# Patient Record
Sex: FEMALE | Race: WHITE | NOT HISPANIC OR LATINO
[De-identification: names, ages, dates, MRNs, and addresses within clinical notes are randomized per-mention and may not be internally consistent; named-entity substitution may affect disease eponyms.]

---

## 2017-01-05 ENCOUNTER — MEDICATION RENEWAL (OUTPATIENT)
Age: 82
End: 2017-01-05

## 2017-01-12 ENCOUNTER — APPOINTMENT (OUTPATIENT)
Dept: CARDIOLOGY | Facility: CLINIC | Age: 82
End: 2017-01-12

## 2017-01-13 ENCOUNTER — MEDICATION RENEWAL (OUTPATIENT)
Age: 82
End: 2017-01-13

## 2017-01-15 ENCOUNTER — INPATIENT (INPATIENT)
Facility: HOSPITAL | Age: 82
LOS: 2 days | Discharge: NURSING, REHAB OR LONG-TERM CARE | End: 2017-01-18
Attending: FAMILY MEDICINE | Admitting: FAMILY MEDICINE
Payer: MEDICARE

## 2017-01-15 ENCOUNTER — RESULT REVIEW (OUTPATIENT)
Age: 82
End: 2017-01-15

## 2017-01-15 PROCEDURE — 73552 X-RAY EXAM OF FEMUR 2/>: CPT | Mod: 26

## 2017-01-15 PROCEDURE — 99285 EMERGENCY DEPT VISIT HI MDM: CPT | Mod: 25

## 2017-01-15 PROCEDURE — 73562 X-RAY EXAM OF KNEE 3: CPT | Mod: 26,RT

## 2017-01-15 PROCEDURE — 70450 CT HEAD/BRAIN W/O DYE: CPT | Mod: 26

## 2017-01-15 PROCEDURE — 72125 CT NECK SPINE W/O DYE: CPT | Mod: 26

## 2017-01-15 PROCEDURE — 71010: CPT | Mod: 26

## 2017-01-15 PROCEDURE — 73501 X-RAY EXAM HIP UNI 1 VIEW: CPT | Mod: 26

## 2017-01-15 PROCEDURE — 93010 ELECTROCARDIOGRAM REPORT: CPT

## 2017-01-16 ENCOUNTER — MEDICATION RENEWAL (OUTPATIENT)
Age: 82
End: 2017-01-16

## 2017-01-16 PROCEDURE — 73501 X-RAY EXAM HIP UNI 1 VIEW: CPT | Mod: 26

## 2017-01-16 PROCEDURE — 88311 DECALCIFY TISSUE: CPT | Mod: 26

## 2017-01-16 PROCEDURE — 88305 TISSUE EXAM BY PATHOLOGIST: CPT | Mod: 26

## 2017-01-17 ENCOUNTER — MEDICATION RENEWAL (OUTPATIENT)
Age: 82
End: 2017-01-17

## 2017-01-18 ENCOUNTER — RX RENEWAL (OUTPATIENT)
Age: 82
End: 2017-01-18

## 2017-01-23 DIAGNOSIS — Z88.8 ALLERGY STATUS TO OTHER DRUGS, MEDICAMENTS AND BIOLOGICAL SUBSTANCES STATUS: ICD-10-CM

## 2017-01-23 DIAGNOSIS — R09.02 HYPOXEMIA: ICD-10-CM

## 2017-01-23 DIAGNOSIS — S72.011A UNSPECIFIED INTRACAPSULAR FRACTURE OF RIGHT FEMUR, INITIAL ENCOUNTER FOR CLOSED FRACTURE: ICD-10-CM

## 2017-01-23 DIAGNOSIS — R82.90 UNSPECIFIED ABNORMAL FINDINGS IN URINE: ICD-10-CM
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
## 2017-01-23 DIAGNOSIS — J44.9 CHRONIC OBSTRUCTIVE PULMONARY DISEASE, UNSPECIFIED: ICD-10-CM

## 2017-01-23 DIAGNOSIS — F41.9 ANXIETY DISORDER, UNSPECIFIED: ICD-10-CM
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
## 2017-01-23 DIAGNOSIS — F32.9 MAJOR DEPRESSIVE DISORDER, SINGLE EPISODE, UNSPECIFIED: ICD-10-CM

## 2017-01-23 DIAGNOSIS — I44.7 LEFT BUNDLE-BRANCH BLOCK, UNSPECIFIED: ICD-10-CM

## 2017-01-23 DIAGNOSIS — I10 ESSENTIAL (PRIMARY) HYPERTENSION: ICD-10-CM

## 2017-01-26 ENCOUNTER — APPOINTMENT (OUTPATIENT)
Dept: CARDIOLOGY | Facility: CLINIC | Age: 82
End: 2017-01-26

## 2017-02-02 ENCOUNTER — APPOINTMENT (OUTPATIENT)
Dept: CARDIOLOGY | Facility: CLINIC | Age: 82
End: 2017-02-02

## 2017-02-13 ENCOUNTER — RX RENEWAL (OUTPATIENT)
Age: 82
End: 2017-02-13

## 2017-02-14 ENCOUNTER — APPOINTMENT (OUTPATIENT)
Dept: CARDIOLOGY | Facility: CLINIC | Age: 82
End: 2017-02-14

## 2017-02-22 ENCOUNTER — APPOINTMENT (OUTPATIENT)
Dept: CARDIOLOGY | Facility: CLINIC | Age: 82
End: 2017-02-22

## 2017-02-22 ENCOUNTER — MEDICATION RENEWAL (OUTPATIENT)
Age: 82
End: 2017-02-22

## 2017-04-24 ENCOUNTER — RX RENEWAL (OUTPATIENT)
Age: 82
End: 2017-04-24

## 2017-05-25 ENCOUNTER — MEDICATION RENEWAL (OUTPATIENT)
Age: 82
End: 2017-05-25

## 2017-05-31 ENCOUNTER — RX RENEWAL (OUTPATIENT)
Age: 82
End: 2017-05-31

## 2017-07-17 ENCOUNTER — EMERGENCY (EMERGENCY)
Facility: HOSPITAL | Age: 82
LOS: 0 days | Discharge: HOME | End: 2017-07-18
Attending: EMERGENCY MEDICINE | Admitting: EMERGENCY MEDICINE
Payer: MEDICARE

## 2017-07-17 VITALS
DIASTOLIC BLOOD PRESSURE: 64 MMHG | TEMPERATURE: 98 F | SYSTOLIC BLOOD PRESSURE: 139 MMHG | RESPIRATION RATE: 15 BRPM | OXYGEN SATURATION: 97 % | HEART RATE: 75 BPM

## 2017-07-17 VITALS — WEIGHT: 110.01 LBS | HEIGHT: 62 IN
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
## 2017-07-17 DIAGNOSIS — S40.022A CONTUSION OF LEFT UPPER ARM, INITIAL ENCOUNTER: ICD-10-CM

## 2017-07-17 DIAGNOSIS — Z88.5 ALLERGY STATUS TO NARCOTIC AGENT: ICD-10-CM
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
## 2017-07-17 DIAGNOSIS — Z79.82 LONG TERM (CURRENT) USE OF ASPIRIN: ICD-10-CM

## 2017-07-17 DIAGNOSIS — S49.92XA UNSPECIFIED INJURY OF LEFT SHOULDER AND UPPER ARM, INITIAL ENCOUNTER: ICD-10-CM

## 2017-07-17 PROCEDURE — 99284 EMERGENCY DEPT VISIT MOD MDM: CPT | Mod: 25

## 2017-07-17 NOTE — ED ADULT TRIAGE NOTE - CHIEF COMPLAINT QUOTE
"My left arm hurts."  Patient and daughter state that the patient was helping to walk with her granddaughter and tripped. Daughter states she caught the patient and that "she didn't hit the ground," but reports that the patient now has pain to her left arm where the daughter grabbed her to catch her.

## 2017-07-18 PROCEDURE — 73502 X-RAY EXAM HIP UNI 2-3 VIEWS: CPT | Mod: 26

## 2017-07-18 PROCEDURE — 73060 X-RAY EXAM OF HUMERUS: CPT | Mod: 26,LT

## 2017-07-18 NOTE — ED PROVIDER NOTE - OBJECTIVE STATEMENT
89 F presents with co pain to her Lt arm after a fall.  Pt did not hit ground but was held by her son.  He was holding her up by her arm which now hurts.  No distal numbness or tingling, no weakness.  No other trauma.  ? injury to Rt hip.  pt denies pain in that area but family request x-ray of hip as well as eval of Lt arm

## 2017-07-18 NOTE — ED PROVIDER NOTE - MUSCULOSKELETAL, MLM
(+) min area of ecchymosis to Lt humerus, FROM, no deformity.  Distal NVI.  cap refill < 2 sec.  FROM b/l le.

## 2017-07-18 NOTE — ED ADULT NURSE NOTE - OBJECTIVE STATEMENT
As per daughter, pt tripped while walking and was assisted to floor with son holding pt's left arm.  Pt sat on Right hip - denies any hip pain - pt c/o pain to left upper arm.  No LOC, did not hit head, was assisted to floor.  Pt is disoriented at baseline.

## 2017-07-21 ENCOUNTER — RX RENEWAL (OUTPATIENT)
Age: 82
End: 2017-07-21

## 2017-07-22 ENCOUNTER — RX RENEWAL (OUTPATIENT)
Age: 82
End: 2017-07-22

## 2017-10-23 ENCOUNTER — RX RENEWAL (OUTPATIENT)
Age: 82
End: 2017-10-23

## 2017-11-10 ENCOUNTER — CLINICAL ADVICE (OUTPATIENT)
Age: 82
End: 2017-11-10

## 2017-11-10 ENCOUNTER — RECORD ABSTRACTING (OUTPATIENT)
Age: 82
End: 2017-11-10

## 2017-11-10 DIAGNOSIS — Z83.49 FAMILY HISTORY OF OTHER ENDOCRINE, NUTRITIONAL AND METABOLIC DISEASES: ICD-10-CM

## 2017-11-10 DIAGNOSIS — Z82.49 FAMILY HISTORY OF ISCHEMIC HEART DISEASE AND OTHER DISEASES OF THE CIRCULATORY SYSTEM: ICD-10-CM

## 2017-11-10 DIAGNOSIS — Z83.3 FAMILY HISTORY OF DIABETES MELLITUS: ICD-10-CM

## 2017-11-10 DIAGNOSIS — Z81.1 FAMILY HISTORY OF ALCOHOL ABUSE AND DEPENDENCE: ICD-10-CM

## 2017-11-10 DIAGNOSIS — Z86.718 PERSONAL HISTORY OF OTHER VENOUS THROMBOSIS AND EMBOLISM: ICD-10-CM

## 2017-11-10 DIAGNOSIS — Z80.0 FAMILY HISTORY OF MALIGNANT NEOPLASM OF DIGESTIVE ORGANS: ICD-10-CM

## 2017-11-15 ENCOUNTER — APPOINTMENT (OUTPATIENT)
Dept: FAMILY MEDICINE | Facility: CLINIC | Age: 82
End: 2017-11-15
Payer: MEDICARE

## 2017-11-15 VITALS
SYSTOLIC BLOOD PRESSURE: 126 MMHG | BODY MASS INDEX: 22.63 KG/M2 | HEIGHT: 62 IN | WEIGHT: 123 LBS | DIASTOLIC BLOOD PRESSURE: 72 MMHG

## 2017-11-15 PROCEDURE — 99213 OFFICE O/P EST LOW 20 MIN: CPT

## 2017-12-01 ENCOUNTER — APPOINTMENT (OUTPATIENT)
Dept: FAMILY MEDICINE | Facility: CLINIC | Age: 82
End: 2017-12-01

## 2018-01-11 ENCOUNTER — APPOINTMENT (OUTPATIENT)
Dept: FAMILY MEDICINE | Facility: CLINIC | Age: 83
End: 2018-01-11
Payer: MEDICARE

## 2018-01-12 ENCOUNTER — APPOINTMENT (OUTPATIENT)
Dept: FAMILY MEDICINE | Facility: CLINIC | Age: 83
End: 2018-01-12
Payer: MEDICARE

## 2018-01-12 VITALS
DIASTOLIC BLOOD PRESSURE: 70 MMHG | BODY MASS INDEX: 22.63 KG/M2 | WEIGHT: 123 LBS | SYSTOLIC BLOOD PRESSURE: 120 MMHG | HEIGHT: 62 IN

## 2018-01-12 PROCEDURE — 36415 COLL VENOUS BLD VENIPUNCTURE: CPT

## 2018-01-12 PROCEDURE — 99214 OFFICE O/P EST MOD 30 MIN: CPT | Mod: 25

## 2018-01-12 RX ORDER — TRAZODONE HYDROCHLORIDE 50 MG/1
50 TABLET ORAL
Qty: 90 | Refills: 0 | Status: DISCONTINUED | COMMUNITY
Start: 2017-01-17 | End: 2018-01-12

## 2018-01-15 ENCOUNTER — FORM ENCOUNTER (OUTPATIENT)
Age: 83
End: 2018-01-15

## 2018-01-15 LAB
25(OH)D3 SERPL-MCNC: 47.8 NG/ML
ALBUMIN SERPL ELPH-MCNC: 3.9 G/DL
ALP BLD-CCNC: 118 U/L
ALT SERPL-CCNC: 13 U/L
ANION GAP SERPL CALC-SCNC: 24 MMOL/L
AST SERPL-CCNC: 14 U/L
BASOPHILS # BLD AUTO: 0.05 K/UL
BASOPHILS NFR BLD AUTO: 0.7 %
BILIRUB SERPL-MCNC: 0.3 MG/DL
BUN SERPL-MCNC: 29 MG/DL
CALCIUM SERPL-MCNC: 9.6 MG/DL
CHLORIDE SERPL-SCNC: 102 MMOL/L
CO2 SERPL-SCNC: 19 MMOL/L
CREAT SERPL-MCNC: 1.36 MG/DL
EOSINOPHIL # BLD AUTO: 0.15 K/UL
EOSINOPHIL NFR BLD AUTO: 2 %
ERYTHROCYTE [SEDIMENTATION RATE] IN BLOOD BY WESTERGREN METHOD: 24 MM/HR
FOLATE SERPL-MCNC: >20 NG/ML
GLUCOSE SERPL-MCNC: 72 MG/DL
HCT VFR BLD CALC: 36.7 %
HGB BLD-MCNC: 11.5 G/DL
IMM GRANULOCYTES NFR BLD AUTO: 0.3 %
LYMPHOCYTES # BLD AUTO: 1.15 K/UL
LYMPHOCYTES NFR BLD AUTO: 15 %
MAN DIFF?: NORMAL
MCHC RBC-ENTMCNC: 30 PG
MCHC RBC-ENTMCNC: 31.3 GM/DL
MCV RBC AUTO: 95.8 FL
MONOCYTES # BLD AUTO: 0.62 K/UL
MONOCYTES NFR BLD AUTO: 8.1 %
NEUTROPHILS # BLD AUTO: 5.67 K/UL
NEUTROPHILS NFR BLD AUTO: 73.9 %
PLATELET # BLD AUTO: 287 K/UL
POTASSIUM SERPL-SCNC: 5.4 MMOL/L
PROT SERPL-MCNC: 6.9 G/DL
RBC # BLD: 3.83 M/UL
RBC # FLD: 14 %
SODIUM SERPL-SCNC: 145 MMOL/L
T3FREE SERPL-MCNC: 2.67 PG/ML
T4 FREE SERPL-MCNC: 1.2 NG/DL
TSH SERPL-ACNC: 2.45 UIU/ML
VIT B12 SERPL-MCNC: 754 PG/ML
WBC # FLD AUTO: 7.66 K/UL

## 2018-01-16 ENCOUNTER — APPOINTMENT (OUTPATIENT)
Dept: CT IMAGING | Facility: CLINIC | Age: 83
End: 2018-01-16
Payer: MEDICARE

## 2018-01-16 ENCOUNTER — OUTPATIENT (OUTPATIENT)
Dept: OUTPATIENT SERVICES | Facility: HOSPITAL | Age: 83
LOS: 1 days | End: 2018-01-16
Payer: MEDICARE

## 2018-01-16 DIAGNOSIS — Z00.8 ENCOUNTER FOR OTHER GENERAL EXAMINATION: ICD-10-CM

## 2018-01-16 PROCEDURE — 70450 CT HEAD/BRAIN W/O DYE: CPT

## 2018-01-16 PROCEDURE — 70450 CT HEAD/BRAIN W/O DYE: CPT | Mod: 26

## 2018-01-22 ENCOUNTER — RX RENEWAL (OUTPATIENT)
Age: 83
End: 2018-01-22

## 2018-01-23 ENCOUNTER — APPOINTMENT (OUTPATIENT)
Dept: FAMILY MEDICINE | Facility: CLINIC | Age: 83
End: 2018-01-23
Payer: MEDICARE

## 2018-01-24 ENCOUNTER — APPOINTMENT (OUTPATIENT)
Dept: FAMILY MEDICINE | Facility: CLINIC | Age: 83
End: 2018-01-24
Payer: MEDICARE

## 2018-01-24 VITALS
HEIGHT: 62 IN | WEIGHT: 123 LBS | SYSTOLIC BLOOD PRESSURE: 118 MMHG | BODY MASS INDEX: 22.63 KG/M2 | DIASTOLIC BLOOD PRESSURE: 68 MMHG

## 2018-01-24 DIAGNOSIS — K92.1 MELENA: ICD-10-CM

## 2018-01-24 PROCEDURE — 99213 OFFICE O/P EST LOW 20 MIN: CPT

## 2018-01-25 ENCOUNTER — EMERGENCY (EMERGENCY)
Facility: HOSPITAL | Age: 83
LOS: 0 days | Discharge: HOME | End: 2018-01-25
Attending: EMERGENCY MEDICINE | Admitting: EMERGENCY MEDICINE
Payer: MEDICARE

## 2018-01-25 VITALS
RESPIRATION RATE: 17 BRPM | WEIGHT: 115.08 LBS | OXYGEN SATURATION: 98 % | DIASTOLIC BLOOD PRESSURE: 88 MMHG | HEIGHT: 58 IN | HEART RATE: 86 BPM | SYSTOLIC BLOOD PRESSURE: 176 MMHG

## 2018-01-25 VITALS
OXYGEN SATURATION: 98 % | DIASTOLIC BLOOD PRESSURE: 76 MMHG | TEMPERATURE: 98 F | SYSTOLIC BLOOD PRESSURE: 145 MMHG | HEART RATE: 89 BPM
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
## 2018-01-25 DIAGNOSIS — Z79.82 LONG TERM (CURRENT) USE OF ASPIRIN: ICD-10-CM
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
## 2018-01-25 DIAGNOSIS — M50.30 OTHER CERVICAL DISC DEGENERATION, UNSPECIFIED CERVICAL REGION: ICD-10-CM

## 2018-01-25 DIAGNOSIS — S62.102A FRACTURE OF UNSPECIFIED CARPAL BONE, LEFT WRIST, INITIAL ENCOUNTER FOR CLOSED FRACTURE: ICD-10-CM

## 2018-01-25 DIAGNOSIS — M25.532 PAIN IN LEFT WRIST: ICD-10-CM

## 2018-01-25 DIAGNOSIS — Z88.5 ALLERGY STATUS TO NARCOTIC AGENT: ICD-10-CM
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
## 2018-01-25 DIAGNOSIS — I67.89 OTHER CEREBROVASCULAR DISEASE: ICD-10-CM

## 2018-01-25 LAB
ALBUMIN SERPL ELPH-MCNC: 3.7 G/DL — SIGNIFICANT CHANGE UP (ref 3.3–5)
ALP SERPL-CCNC: 159 U/L — HIGH (ref 40–120)
ALT FLD-CCNC: 21 U/L — SIGNIFICANT CHANGE UP (ref 12–78)
ANION GAP SERPL CALC-SCNC: 10 MMOL/L — SIGNIFICANT CHANGE UP (ref 5–17)
AST SERPL-CCNC: 15 U/L — SIGNIFICANT CHANGE UP (ref 15–37)
BASOPHILS # BLD AUTO: 0.1 K/UL — SIGNIFICANT CHANGE UP (ref 0–0.2)
BASOPHILS NFR BLD AUTO: 1.7 % — SIGNIFICANT CHANGE UP (ref 0–2)
BILIRUB SERPL-MCNC: 0.3 MG/DL — SIGNIFICANT CHANGE UP (ref 0.2–1.2)
BUN SERPL-MCNC: 21 MG/DL — SIGNIFICANT CHANGE UP (ref 7–23)
CALCIUM SERPL-MCNC: 8.8 MG/DL — SIGNIFICANT CHANGE UP (ref 8.5–10.1)
CHLORIDE SERPL-SCNC: 106 MMOL/L — SIGNIFICANT CHANGE UP (ref 96–108)
CO2 SERPL-SCNC: 24 MMOL/L — SIGNIFICANT CHANGE UP (ref 22–31)
CREAT SERPL-MCNC: 1.18 MG/DL — SIGNIFICANT CHANGE UP (ref 0.5–1.3)
EOSINOPHIL # BLD AUTO: 0.2 K/UL — SIGNIFICANT CHANGE UP (ref 0–0.5)
EOSINOPHIL NFR BLD AUTO: 2.2 % — SIGNIFICANT CHANGE UP (ref 0–6)
GLUCOSE SERPL-MCNC: 85 MG/DL — SIGNIFICANT CHANGE UP (ref 70–99)
HCT VFR BLD CALC: 39.8 % — SIGNIFICANT CHANGE UP (ref 34.5–45)
HGB BLD-MCNC: 13 G/DL — SIGNIFICANT CHANGE UP (ref 11.5–15.5)
LYMPHOCYTES # BLD AUTO: 1.5 K/UL — SIGNIFICANT CHANGE UP (ref 1–3.3)
LYMPHOCYTES # BLD AUTO: 21.1 % — SIGNIFICANT CHANGE UP (ref 13–44)
MCHC RBC-ENTMCNC: 30.1 PG — SIGNIFICANT CHANGE UP (ref 27–34)
MCHC RBC-ENTMCNC: 32.8 GM/DL — SIGNIFICANT CHANGE UP (ref 32–36)
MCV RBC AUTO: 91.7 FL — SIGNIFICANT CHANGE UP (ref 80–100)
MONOCYTES # BLD AUTO: 0.3 K/UL — SIGNIFICANT CHANGE UP (ref 0–0.9)
MONOCYTES NFR BLD AUTO: 4.4 % — SIGNIFICANT CHANGE UP (ref 2–14)
NEUTROPHILS # BLD AUTO: 5.1 K/UL — SIGNIFICANT CHANGE UP (ref 1.8–7.4)
NEUTROPHILS NFR BLD AUTO: 70.6 % — SIGNIFICANT CHANGE UP (ref 43–77)
PLATELET # BLD AUTO: 241 K/UL — SIGNIFICANT CHANGE UP (ref 150–400)
POTASSIUM SERPL-MCNC: 4.2 MMOL/L — SIGNIFICANT CHANGE UP (ref 3.5–5.3)
POTASSIUM SERPL-SCNC: 4.2 MMOL/L — SIGNIFICANT CHANGE UP (ref 3.5–5.3)
PROT SERPL-MCNC: 7.8 GM/DL — SIGNIFICANT CHANGE UP (ref 6–8.3)
RBC # BLD: 4.34 M/UL — SIGNIFICANT CHANGE UP (ref 3.8–5.2)
RBC # FLD: 12.2 % — SIGNIFICANT CHANGE UP (ref 10.3–14.5)
SODIUM SERPL-SCNC: 140 MMOL/L — SIGNIFICANT CHANGE UP (ref 135–145)
WBC # BLD: 7.2 K/UL — SIGNIFICANT CHANGE UP (ref 3.8–10.5)
WBC # FLD AUTO: 7.2 K/UL — SIGNIFICANT CHANGE UP (ref 3.8–10.5)

## 2018-01-25 PROCEDURE — 73110 X-RAY EXAM OF WRIST: CPT | Mod: 26,LT

## 2018-01-25 PROCEDURE — 99285 EMERGENCY DEPT VISIT HI MDM: CPT | Mod: 25

## 2018-01-25 PROCEDURE — 72190 X-RAY EXAM OF PELVIS: CPT | Mod: 26

## 2018-01-25 PROCEDURE — 73110 X-RAY EXAM OF WRIST: CPT | Mod: 26,77,LT

## 2018-01-25 PROCEDURE — 72125 CT NECK SPINE W/O DYE: CPT | Mod: 26

## 2018-01-25 PROCEDURE — 70450 CT HEAD/BRAIN W/O DYE: CPT | Mod: 26

## 2018-01-25 PROCEDURE — 73090 X-RAY EXAM OF FOREARM: CPT | Mod: 26,LT

## 2018-01-25 PROCEDURE — 71045 X-RAY EXAM CHEST 1 VIEW: CPT | Mod: 26

## 2018-01-25 RX ORDER — SODIUM CHLORIDE 9 MG/ML
250 INJECTION INTRAMUSCULAR; INTRAVENOUS; SUBCUTANEOUS ONCE
Qty: 0 | Refills: 0 | Status: COMPLETED | OUTPATIENT
Start: 2018-01-25 | End: 2018-01-25

## 2018-01-25 RX ORDER — CEPHALEXIN 500 MG
500 CAPSULE ORAL
Qty: 0 | Refills: 0 | Status: DISCONTINUED | OUTPATIENT
Start: 2018-01-25 | End: 2018-01-25

## 2018-01-25 RX ORDER — CEFAZOLIN SODIUM 1 G
VIAL (EA) INJECTION
Qty: 0 | Refills: 0 | Status: DISCONTINUED | OUTPATIENT
Start: 2018-01-25 | End: 2018-01-25

## 2018-01-25 RX ORDER — CEFAZOLIN SODIUM 1 G
2000 VIAL (EA) INJECTION ONCE
Qty: 0 | Refills: 0 | Status: COMPLETED | OUTPATIENT
Start: 2018-01-25 | End: 2018-01-25

## 2018-01-25 RX ORDER — CEPHALEXIN 500 MG
1 CAPSULE ORAL
Qty: 15 | Refills: 0 | OUTPATIENT
Start: 2018-01-25 | End: 2018-01-29

## 2018-01-25 RX ORDER — CEFAZOLIN SODIUM 1 G
2000 VIAL (EA) INJECTION EVERY 8 HOURS
Qty: 0 | Refills: 0 | Status: DISCONTINUED | OUTPATIENT
Start: 2018-01-26 | End: 2018-01-25

## 2018-01-25 RX ADMIN — SODIUM CHLORIDE 250 MILLILITER(S): 9 INJECTION INTRAMUSCULAR; INTRAVENOUS; SUBCUTANEOUS at 20:16

## 2018-01-25 RX ADMIN — Medication 200 MILLIGRAM(S): at 22:37

## 2018-01-25 NOTE — ED ADULT TRIAGE NOTE - CHIEF COMPLAINT QUOTE
patient ambulating at home, trip and fall with pain to left wrist, patient did not hit her head or lose consciousness

## 2018-01-25 NOTE — CONSULT NOTE ADULT - ASSESSMENT
A/P: 89y Female with L distal both bone forearm fracture  Antibiotics - ancef while in house, keflex if discharged  Pain control  NWB L UE in splint, keep c/d/I, sling for comfort  FU postreduction xrays  Ice/elevation   Active movement of fingers encouraged  Ca/Vit D, outpt osteoporosis workup  Rcmnd 500 mg Vit C three times daily  Si/Sx compartment syndrome discussed with patient, told to return to ED if exhibit any  likely need for surgical intervention discussed with patient  All question answered  Can follow up with Dr. Jain as outpatient in office tomorrow, call office for appointment   Ortho stable

## 2018-01-25 NOTE — ED PROVIDER NOTE - PROGRESS NOTE DETAILS
ED attending, Dr. Espinosa: Orthopedic states that pt is okay to go home. Pt to be seen tomorrow by Dr. Jain. Does not recommend admission at this pint. Pt's forearm is reduced as best as ortho can. Spoke with pt's family, provided pt and family with outpatient f/u information and instruction to return if worsening of sx. ED attending, Dr. Espinosa: Orthopedic states that pt is okay to go home. Pt to be seen tomorrow by Dr. Jain. Does not recommend admission at this point. Pt's forearm is reduced as best as ortho can. Spoke with pt's family, provided pt and family with outpatient f/u information and instruction to return if worsening of sx.

## 2018-01-25 NOTE — ED PROVIDER NOTE - CARDIAC, MLM
Normal rate, regular rhythm.  Heart sounds S1, S2.  No murmurs, rubs or gallops. 2+ pulses b/l radial arteries. Normal rate, regular rhythm.  Heart sounds S1, S2.  No rubs or gallops. 2+ pulses b/l radial arteries.

## 2018-01-25 NOTE — ED PROVIDER NOTE - CONSTITUTIONAL, MLM
normal... Well appearing, well nourished, awake, alert, oriented to person, place, time/situation and in no apparent distress. Well appearing, well nourished, awake, alert and in no apparent distress. Nontoxic appearing.

## 2018-01-25 NOTE — ED PROVIDER NOTE - NS_ ATTENDINGSCRIBEDETAILS _ED_A_ED_FT
I Lupillo Espinosa MD saw and examined the patient. Scribe documented for me and under my supervision. I have modified the scribe's documentation where necessary to reflect my history, physical exam findings and other relevant documentations pertaining to the care of the patient.

## 2018-01-25 NOTE — ED PROVIDER NOTE - NEUROLOGICAL, MLM
Alert and oriented, no focal deficits, no motor or sensory deficits. Sensation and proprioception intact. Alert and awake, no motor or sensory deficits. Sensation and proprioception intact.

## 2018-01-25 NOTE — ED PROVIDER NOTE - OBJECTIVE STATEMENT
89 y/o female with no relevant PMHx presents to the ED s/p unwitnessed mechanical fall PTA. Pt tripped over a piece of wood between two rooms and landed on her left wrist. No head trauma or LOC. Pt was ambulatory after the fall. +left wrist pain. No lightheadedness, CP, SOB, abd pain, neck stiffness, leg pain before or after the fall. Pt lives with daughters. Allergic to Codeine, feels faint when she takes it. 89 y/o female with no relevant PMHx presents to the ED s/p unwitnessed mechanical fall PTA. Pt tripped over a piece of wood between two rooms and landed on her left wrist. No head trauma or LOC. Pt was ambulatory after the fall. +left wrist pain and deformity. No lightheadedness, CP, SOB, abd pain, neck stiffness, leg pain before or after the fall. Pt lives with daughters. Allergic to Codeine, feels faint when she takes it.

## 2018-01-25 NOTE — CONSULT NOTE ADULT - SUBJECTIVE AND OBJECTIVE BOX
89y Female RHD presents c/o L wrist pain sp mechanical fall. Denies HS/LOC. Denies numbness/tingling. Denies fever/chills. Denies pain/injury elsewhere. No other complaints.  Community ambulator with walker.      PAST MEDICAL & SURGICAL HISTORY:      MEDICATIONS  (STANDING):  ceFAZolin   IVPB        Allergies    codeine (Unknown)    Intolerances                                13.0   7.2   )-----------( 241      ( 25 Jan 2018 19:03 )             39.8     25 Jan 2018 19:03    140    |  106    |  21     ----------------------------<  85     4.2     |  24     |  1.18     Ca    8.8        25 Jan 2018 19:03    TPro  7.8    /  Alb  3.7    /  TBili  0.3    /  DBili  x      /  AST  15     /  ALT  21     /  AlkPhos  159    25 Jan 2018 19:03      Vital Signs Last 24 Hrs  T(C): 37.1 (01-25-18 @ 21:20), Max: 37.1 (01-25-18 @ 21:20)  T(F): 98.7 (01-25-18 @ 21:20), Max: 98.7 (01-25-18 @ 21:20)  HR: 84 (01-25-18 @ 21:20) (84 - 86)  BP: 141/73 (01-25-18 @ 21:20) (141/73 - 176/88)  BP(mean): --  RR: 16 (01-25-18 @ 21:20) (16 - 17)  SpO2: 98% (01-25-18 @ 21:20) (98% - 98%)    Imaging: XR demonstrates left distal both bone forearm fracture    Physical Exam  Gen: NAD  L UE: 0.5cm pokehole laceration over volar aspect of distal forearm, +deformity at wrist, +ttp wrist, +r/u/m/ain/pin function, SILT, radial pulse intact, compartments soft/compressible, warm/well perfused    Procedure:     Wound irrigated and gross debris removed.    10 cc 1% lidocaine injected under sterile procedure into L wrist fracture hematoma. Closed reduction performed. Placed in well padded sugartong splint. Post procedure xrays obtained. Post procedure exam unchanged, NV intact. Patient tolerated well.  2nd reduction attempt performed.  Post procedure exam unchanged, NV intact. Patient tolerated well.

## 2018-01-25 NOTE — ED PROVIDER NOTE - MUSCULOSKELETAL, MLM
Spine appears normal. 5/5 strength. nontender to palpitation of hip. +bruising on fifth metacarpal pharyngeal joint. Visible deformity of left forearm. Spine appears normal. 5/5 strength on flexion and extension. nontender to palpitation of hip. +bruising on fifth metacarpal phalangeal joint. Visible deformity of left distal forearm.

## 2018-01-26 ENCOUNTER — APPOINTMENT (OUTPATIENT)
Dept: ORTHOPEDIC SURGERY | Facility: CLINIC | Age: 83
End: 2018-01-26
Payer: MEDICARE

## 2018-01-26 VITALS
WEIGHT: 123 LBS | SYSTOLIC BLOOD PRESSURE: 133 MMHG | HEART RATE: 74 BPM | TEMPERATURE: 98.1 F | DIASTOLIC BLOOD PRESSURE: 70 MMHG | BODY MASS INDEX: 22.63 KG/M2 | HEIGHT: 62 IN

## 2018-01-26 PROCEDURE — 99203 OFFICE O/P NEW LOW 30 MIN: CPT

## 2018-01-29 ENCOUNTER — APPOINTMENT (OUTPATIENT)
Dept: FAMILY MEDICINE | Facility: CLINIC | Age: 83
End: 2018-01-29
Payer: MEDICARE

## 2018-01-29 ENCOUNTER — NON-APPOINTMENT (OUTPATIENT)
Age: 83
End: 2018-01-29

## 2018-01-29 VITALS
BODY MASS INDEX: 22.63 KG/M2 | SYSTOLIC BLOOD PRESSURE: 110 MMHG | HEIGHT: 62 IN | DIASTOLIC BLOOD PRESSURE: 70 MMHG | WEIGHT: 123 LBS

## 2018-01-29 PROCEDURE — 99215 OFFICE O/P EST HI 40 MIN: CPT | Mod: 25

## 2018-01-29 PROCEDURE — 93000 ELECTROCARDIOGRAM COMPLETE: CPT

## 2018-01-30 RX ORDER — FAMOTIDINE 10 MG/ML
20 INJECTION INTRAVENOUS ONCE
Qty: 0 | Refills: 0 | Status: COMPLETED | OUTPATIENT
Start: 2018-01-31 | End: 2018-01-31

## 2018-01-31 ENCOUNTER — APPOINTMENT (OUTPATIENT)
Dept: ORTHOPEDIC SURGERY | Facility: HOSPITAL | Age: 83
End: 2018-01-31

## 2018-01-31 ENCOUNTER — OUTPATIENT (OUTPATIENT)
Dept: OUTPATIENT SERVICES | Facility: HOSPITAL | Age: 83
LOS: 1 days | Discharge: HOME | End: 2018-01-31
Payer: MEDICARE

## 2018-01-31 VITALS
RESPIRATION RATE: 16 BRPM | SYSTOLIC BLOOD PRESSURE: 147 MMHG | WEIGHT: 115.08 LBS | HEART RATE: 67 BPM | DIASTOLIC BLOOD PRESSURE: 75 MMHG | HEIGHT: 60 IN | OXYGEN SATURATION: 100 % | TEMPERATURE: 97 F

## 2018-01-31 LAB
APTT BLD: 28 SEC — SIGNIFICANT CHANGE UP (ref 27.5–37.4)
INR BLD: 1.09 RATIO — SIGNIFICANT CHANGE UP (ref 0.88–1.16)
PROTHROM AB SERPL-ACNC: 11.8 SEC — SIGNIFICANT CHANGE UP (ref 9.8–12.7)

## 2018-01-31 PROCEDURE — 25575 OPTX RDL&ULN SHFT FX RDS&ULN: CPT | Mod: LT

## 2018-01-31 RX ORDER — ONDANSETRON 8 MG/1
4 TABLET, FILM COATED ORAL EVERY 6 HOURS
Qty: 0 | Refills: 0 | Status: DISCONTINUED | OUTPATIENT
Start: 2018-01-31 | End: 2018-02-01

## 2018-01-31 RX ORDER — MUPIROCIN 20 MG/G
1 OINTMENT TOPICAL
Qty: 0 | Refills: 0 | COMMUNITY

## 2018-01-31 RX ORDER — CEFAZOLIN SODIUM 1 G
2000 VIAL (EA) INJECTION EVERY 8 HOURS
Qty: 0 | Refills: 0 | Status: COMPLETED | OUTPATIENT
Start: 2018-01-31 | End: 2018-02-01

## 2018-01-31 RX ORDER — TRAMADOL HYDROCHLORIDE 50 MG/1
1 TABLET ORAL
Qty: 28 | Refills: 0 | OUTPATIENT
Start: 2018-01-31 | End: 2018-02-06

## 2018-01-31 RX ORDER — FENTANYL CITRATE 50 UG/ML
25 INJECTION INTRAVENOUS
Qty: 0 | Refills: 0 | Status: DISCONTINUED | OUTPATIENT
Start: 2018-01-31 | End: 2018-01-31

## 2018-01-31 RX ORDER — FOLIC ACID 0.8 MG
1 TABLET ORAL DAILY
Qty: 0 | Refills: 0 | Status: DISCONTINUED | OUTPATIENT
Start: 2018-01-31 | End: 2018-02-01

## 2018-01-31 RX ORDER — ACETAMINOPHEN 500 MG
650 TABLET ORAL EVERY 6 HOURS
Qty: 0 | Refills: 0 | Status: DISCONTINUED | OUTPATIENT
Start: 2018-01-31 | End: 2018-02-01

## 2018-01-31 RX ORDER — ASCORBIC ACID 60 MG
500 TABLET,CHEWABLE ORAL
Qty: 0 | Refills: 0 | Status: DISCONTINUED | OUTPATIENT
Start: 2018-01-31 | End: 2018-02-01

## 2018-01-31 RX ORDER — ASPIRIN/CALCIUM CARB/MAGNESIUM 324 MG
0 TABLET ORAL
Qty: 0 | Refills: 0 | COMMUNITY

## 2018-01-31 RX ORDER — SODIUM CHLORIDE 9 MG/ML
3 INJECTION INTRAMUSCULAR; INTRAVENOUS; SUBCUTANEOUS EVERY 8 HOURS
Qty: 0 | Refills: 0 | Status: DISCONTINUED | OUTPATIENT
Start: 2018-01-31 | End: 2018-01-31

## 2018-01-31 RX ORDER — DOCUSATE SODIUM 100 MG
100 CAPSULE ORAL THREE TIMES A DAY
Qty: 0 | Refills: 0 | Status: DISCONTINUED | OUTPATIENT
Start: 2018-01-31 | End: 2018-02-01

## 2018-01-31 RX ORDER — SODIUM CHLORIDE 9 MG/ML
1000 INJECTION, SOLUTION INTRAVENOUS
Qty: 0 | Refills: 0 | Status: DISCONTINUED | OUTPATIENT
Start: 2018-01-31 | End: 2018-02-01

## 2018-01-31 RX ORDER — DIPHENHYDRAMINE HCL 50 MG
25 CAPSULE ORAL AT BEDTIME
Qty: 0 | Refills: 0 | Status: DISCONTINUED | OUTPATIENT
Start: 2018-01-31 | End: 2018-02-01

## 2018-01-31 RX ORDER — SODIUM CHLORIDE 9 MG/ML
1000 INJECTION INTRAMUSCULAR; INTRAVENOUS; SUBCUTANEOUS
Qty: 0 | Refills: 0 | Status: DISCONTINUED | OUTPATIENT
Start: 2018-01-31 | End: 2018-01-31

## 2018-01-31 RX ORDER — ONDANSETRON 8 MG/1
4 TABLET, FILM COATED ORAL ONCE
Qty: 0 | Refills: 0 | Status: DISCONTINUED | OUTPATIENT
Start: 2018-01-31 | End: 2018-01-31

## 2018-01-31 RX ORDER — ACETAMINOPHEN 500 MG
1000 TABLET ORAL ONCE
Qty: 0 | Refills: 0 | Status: COMPLETED | OUTPATIENT
Start: 2018-01-31 | End: 2018-01-31

## 2018-01-31 RX ORDER — TRAMADOL HYDROCHLORIDE 50 MG/1
50 TABLET ORAL EVERY 6 HOURS
Qty: 0 | Refills: 0 | Status: DISCONTINUED | OUTPATIENT
Start: 2018-01-31 | End: 2018-02-01

## 2018-01-31 RX ADMIN — Medication 400 MILLIGRAM(S): at 15:41

## 2018-01-31 RX ADMIN — Medication 200 MILLIGRAM(S): at 21:52

## 2018-01-31 RX ADMIN — TRAMADOL HYDROCHLORIDE 50 MILLIGRAM(S): 50 TABLET ORAL at 18:31

## 2018-01-31 RX ADMIN — FAMOTIDINE 20 MILLIGRAM(S): 10 INJECTION INTRAVENOUS at 11:27

## 2018-01-31 RX ADMIN — SODIUM CHLORIDE 75 MILLILITER(S): 9 INJECTION INTRAMUSCULAR; INTRAVENOUS; SUBCUTANEOUS at 15:41

## 2018-01-31 RX ADMIN — TRAMADOL HYDROCHLORIDE 50 MILLIGRAM(S): 50 TABLET ORAL at 18:01

## 2018-01-31 NOTE — ASU DISCHARGE PLAN (ADULT/PEDIATRIC). - SPECIAL INSTRUCTIONS
1.	Pain Control  2.	Non-Weight Bearing  Left Upper Extremity  3.	DVT Prophylaxis - encourage ambulation  4.	PT as needed  5.	Follow up with Dr. Jain as outpatient in 10-14 days after discharge from the hospital or rehab. Call office for appointment.   6.	Suture removal and repeat x-rays on  Post-Op Day 14  7.	Ice/Elevate affected area as needed  8.	Keep Splint Clean and dry.

## 2018-01-31 NOTE — ASU DISCHARGE PLAN (ADULT/PEDIATRIC). - MEDICATION SUMMARY - MEDICATIONS TO STOP TAKING
I will STOP taking the medications listed below when I get home from the hospital:    mupirocin 2% nasal ointment  -- 1 application into nose 2 times a day

## 2018-01-31 NOTE — BRIEF OPERATIVE NOTE - PROCEDURE
<<-----Click on this checkbox to enter Procedure Open reduction and internal fixation (ORIF) of forearm  01/31/2018    Active  [id number]

## 2018-01-31 NOTE — ASU DISCHARGE PLAN (ADULT/PEDIATRIC). - MEDICATION SUMMARY - MEDICATIONS TO TAKE
I will START or STAY ON the medications listed below when I get home from the hospital:    aspirin  -- 81  orally  -- Indication: For home med    Pristiq  --  by mouth   -- Indication: For home med    mirtazapine  --  by mouth   -- Indication: For home med    nortriptyline  --  by mouth   -- Indication: For home med    atorvastatin  --  by mouth   -- Indication: For home med    ARIPiprazole  --  by mouth   -- Indication: For home med    busPIRone  --  by mouth   -- Indication: For home med    metoprolol  --  by mouth   -- Indication: For home med    amLODIPine  --  by mouth   -- Indication: For home med    Vitamin D3  --  by mouth   -- Indication: For home med I will START or STAY ON the medications listed below when I get home from the hospital:    aspirin  -- 81  orally  -- Indication: For home med    Ultram 50 mg oral tablet  -- 1 tab(s) by mouth every 6 hours MDD:4   -- Caution federal law prohibits the transfer of this drug to any person other  than the person for whom it was prescribed.  May cause drowsiness.  Alcohol may intensify this effect.  Use care when operating dangerous machinery.  Obtain medical advice before taking any non-prescription drugs as some may affect the action of this medication.    -- Indication: For Pain    Pristiq  --  by mouth   -- Indication: For home med    mirtazapine  --  by mouth   -- Indication: For home med    nortriptyline  --  by mouth   -- Indication: For home med    atorvastatin  --  by mouth   -- Indication: For home med    ARIPiprazole  --  by mouth   -- Indication: For home med    busPIRone  --  by mouth   -- Indication: For home med    metoprolol  --  by mouth   -- Indication: For home med    amLODIPine  --  by mouth   -- Indication: For home med    Vitamin D3  --  by mouth   -- Indication: For home med

## 2018-02-01 VITALS
OXYGEN SATURATION: 95 % | HEART RATE: 87 BPM | SYSTOLIC BLOOD PRESSURE: 149 MMHG | DIASTOLIC BLOOD PRESSURE: 66 MMHG | RESPIRATION RATE: 16 BRPM | TEMPERATURE: 98 F

## 2018-02-01 LAB
ANION GAP SERPL CALC-SCNC: 9 MMOL/L — SIGNIFICANT CHANGE UP (ref 5–17)
BUN SERPL-MCNC: 12 MG/DL — SIGNIFICANT CHANGE UP (ref 7–23)
CALCIUM SERPL-MCNC: 8.4 MG/DL — LOW (ref 8.5–10.1)
CHLORIDE SERPL-SCNC: 104 MMOL/L — SIGNIFICANT CHANGE UP (ref 96–108)
CO2 SERPL-SCNC: 24 MMOL/L — SIGNIFICANT CHANGE UP (ref 22–31)
CREAT SERPL-MCNC: 1.14 MG/DL — SIGNIFICANT CHANGE UP (ref 0.5–1.3)
GLUCOSE SERPL-MCNC: 118 MG/DL — HIGH (ref 70–99)
HCT VFR BLD CALC: 31.2 % — LOW (ref 34.5–45)
HGB BLD-MCNC: 10.4 G/DL — LOW (ref 11.5–15.5)
MCHC RBC-ENTMCNC: 30.7 PG — SIGNIFICANT CHANGE UP (ref 27–34)
MCHC RBC-ENTMCNC: 33.5 GM/DL — SIGNIFICANT CHANGE UP (ref 32–36)
MCV RBC AUTO: 91.6 FL — SIGNIFICANT CHANGE UP (ref 80–100)
PLATELET # BLD AUTO: 210 K/UL — SIGNIFICANT CHANGE UP (ref 150–400)
POTASSIUM SERPL-MCNC: 3.6 MMOL/L — SIGNIFICANT CHANGE UP (ref 3.5–5.3)
POTASSIUM SERPL-SCNC: 3.6 MMOL/L — SIGNIFICANT CHANGE UP (ref 3.5–5.3)
RBC # BLD: 3.4 M/UL — LOW (ref 3.8–5.2)
RBC # FLD: 12.4 % — SIGNIFICANT CHANGE UP (ref 10.3–14.5)
SODIUM SERPL-SCNC: 137 MMOL/L — SIGNIFICANT CHANGE UP (ref 135–145)
WBC # BLD: 9.1 K/UL — SIGNIFICANT CHANGE UP (ref 3.8–10.5)
WBC # FLD AUTO: 9.1 K/UL — SIGNIFICANT CHANGE UP (ref 3.8–10.5)

## 2018-02-01 RX ADMIN — TRAMADOL HYDROCHLORIDE 50 MILLIGRAM(S): 50 TABLET ORAL at 07:39

## 2018-02-01 RX ADMIN — TRAMADOL HYDROCHLORIDE 50 MILLIGRAM(S): 50 TABLET ORAL at 03:00

## 2018-02-01 RX ADMIN — Medication 650 MILLIGRAM(S): at 06:31

## 2018-02-01 RX ADMIN — Medication 1 MILLIGRAM(S): at 09:34

## 2018-02-01 RX ADMIN — Medication 1 TABLET(S): at 09:34

## 2018-02-01 RX ADMIN — Medication 200 MILLIGRAM(S): at 05:50

## 2018-02-01 RX ADMIN — Medication 650 MILLIGRAM(S): at 05:53

## 2018-02-01 RX ADMIN — TRAMADOL HYDROCHLORIDE 50 MILLIGRAM(S): 50 TABLET ORAL at 02:20

## 2018-02-01 NOTE — PROGRESS NOTE ADULT - SUBJECTIVE AND OBJECTIVE BOX
Patient seen and examined. Pain controlled. No acute events overnight    HEALTH ISSUES - PROBLEM Dx:  s/p L BBFA ORIF      MEDICATIONS  (STANDING):  ascorbic acid 500 milliGRAM(s) Oral two times a day  docusate sodium 100 milliGRAM(s) Oral three times a day  folic acid 1 milliGRAM(s) Oral daily  lactated ringers. 1000 milliLiter(s) IV Continuous <Continuous>  multivitamin 1 Tablet(s) Oral daily    Allergies    codeine (Unknown)    Intolerances              PT/INR - ( 31 Jan 2018 12:26 )   PT: 11.8 sec;   INR: 1.09 ratio         PTT - ( 31 Jan 2018 12:26 )  PTT:28.0 sec  Vital Signs Last 24 Hrs  T(C): 37.2 (02-01-18 @ 05:31), Max: 37.2 (02-01-18 @ 05:31)  T(F): 98.9 (02-01-18 @ 05:31), Max: 98.9 (02-01-18 @ 05:31)  HR: 100 (02-01-18 @ 05:31) (63 - 100)  BP: 143/65 (02-01-18 @ 05:31) (133/59 - 166/52)  BP(mean): --  RR: 16 (02-01-18 @ 05:31) (14 - 17)  SpO2: 94% (02-01-18 @ 05:31) (93% - 100%)    Physical Exam  Gen: NAD  LUE:  Dressing c/d/i  +ain/pin/rad/uln  fingers silt  brisk cap refill, fingers warm  Compartments soft    A/P: 89y Female sp L BBFA Fx ORIF  Pain control  SCD  NWB  FU labs  Ice/elevate  Incentive spirometry  Dispo planning

## 2018-02-04 ENCOUNTER — INPATIENT (INPATIENT)
Facility: HOSPITAL | Age: 83
LOS: 3 days | Discharge: NURSING, REHAB OR LONG-TERM CARE | End: 2018-02-08
Attending: FAMILY MEDICINE | Admitting: FAMILY MEDICINE
Payer: MEDICARE

## 2018-02-04 VITALS
TEMPERATURE: 98 F | OXYGEN SATURATION: 94 % | HEART RATE: 96 BPM | RESPIRATION RATE: 17 BRPM | WEIGHT: 115.08 LBS | DIASTOLIC BLOOD PRESSURE: 76 MMHG | SYSTOLIC BLOOD PRESSURE: 158 MMHG | HEIGHT: 62 IN

## 2018-02-04 PROCEDURE — 73590 X-RAY EXAM OF LOWER LEG: CPT | Mod: 26,LT

## 2018-02-04 PROCEDURE — 73552 X-RAY EXAM OF FEMUR 2/>: CPT | Mod: 26

## 2018-02-04 PROCEDURE — 73502 X-RAY EXAM HIP UNI 2-3 VIEWS: CPT | Mod: 26

## 2018-02-04 PROCEDURE — 73562 X-RAY EXAM OF KNEE 3: CPT | Mod: 26,LT

## 2018-02-04 RX ORDER — ACETAMINOPHEN 500 MG
650 TABLET ORAL ONCE
Qty: 0 | Refills: 0 | Status: COMPLETED | OUTPATIENT
Start: 2018-02-04 | End: 2018-02-04

## 2018-02-04 RX ADMIN — Medication 650 MILLIGRAM(S): at 23:14

## 2018-02-04 NOTE — ED PROVIDER NOTE - PROGRESS NOTE DETAILS
Pts knee pain improved with lidoderm patch.  If CT negative, pts family will pick her up later today. Ortho resident paged for consult and coming to see patient. Pt. having difficulty with left arm and left leg fracture and will not be able to get around at home.  Pt. will be admitted for physical therapy and rehab placement.

## 2018-02-04 NOTE — ED ADULT NURSE REASSESSMENT NOTE - NS ED NURSE REASSESS COMMENT FT1
pt BIBA s/p tripped over rug and now left knee pain, pt states she hit her left side against sink and wall, but did not hit head or ground. _ +pp, toes active with +sensation, has difficulty raising leg at this time. Dr. Keller at bedside, tylenol ordered. Awaiting xrays. Fell last wk and injured left arm (ace wrap at this time)

## 2018-02-04 NOTE — ED ADULT TRIAGE NOTE - CHIEF COMPLAINT QUOTE
c/o left knee pain after tripping on rug at home, denies head injury or LOC., pt ambulatory with assistance at baseline and was able to assist in getting up on scene. Left arm splinted from injury 4 days ago.

## 2018-02-04 NOTE — ED PROVIDER NOTE - MUSCULOSKELETAL, MLM
Spine appears normal, Splint on left forearm; left fingers with healing ecchymosis and mild swelling but full range of motion.  +tenderness to left anterior knee.  Decreased ROM of left lower extremity without deformity or left ankle, tib/fib, or hip tenderness.

## 2018-02-04 NOTE — ED PROVIDER NOTE - MEDICAL DECISION MAKING DETAILS
Mechanical fall with left leg pain.  Xrays to r/o fracture.  Tylenol for pain and ambulation trial if xrays are normal.

## 2018-02-04 NOTE — ED PROVIDER NOTE - OBJECTIVE STATEMENT
Pt. is a 89 yo F with a hx of depression and anxiety, left hip surgery, HTN, hyperlipidemia, recent left forearm fracture BIB ambulance after mechanical fall tripping on a bunched up rug on the floor.  Pt. was walking into the bathroom and has a splint on her left arm and tripped on the rug and fell into the wall and then to the floor on her left leg.  Pt. denies head hitting floor.  Denies LOC.  Complains of pain in left knee.  Had trouble walking due to the pain.  Takes aspirin 81 mg daily.  No other injury.

## 2018-02-04 NOTE — ED PROVIDER NOTE - CARE PLAN
Principal Discharge DX:	Sprain of left knee, initial encounter Principal Discharge DX:	Tibial plateau fracture, left, closed, initial encounter

## 2018-02-05 LAB
24R-OH-CALCIDIOL SERPL-MCNC: 49.3 NG/ML — SIGNIFICANT CHANGE UP (ref 30–80)
ALBUMIN SERPL ELPH-MCNC: 3.3 G/DL — SIGNIFICANT CHANGE UP (ref 3.3–5)
ALP SERPL-CCNC: 106 U/L — SIGNIFICANT CHANGE UP (ref 40–120)
ALT FLD-CCNC: 15 U/L — SIGNIFICANT CHANGE UP (ref 12–78)
ANION GAP SERPL CALC-SCNC: 8 MMOL/L — SIGNIFICANT CHANGE UP (ref 5–17)
AST SERPL-CCNC: 19 U/L — SIGNIFICANT CHANGE UP (ref 15–37)
BASOPHILS # BLD AUTO: 0.1 K/UL — SIGNIFICANT CHANGE UP (ref 0–0.2)
BASOPHILS NFR BLD AUTO: 1.1 % — SIGNIFICANT CHANGE UP (ref 0–2)
BILIRUB SERPL-MCNC: 0.5 MG/DL — SIGNIFICANT CHANGE UP (ref 0.2–1.2)
BUN SERPL-MCNC: 15 MG/DL — SIGNIFICANT CHANGE UP (ref 7–23)
CALCIUM SERPL-MCNC: 8.9 MG/DL — SIGNIFICANT CHANGE UP (ref 8.5–10.1)
CHLORIDE SERPL-SCNC: 107 MMOL/L — SIGNIFICANT CHANGE UP (ref 96–108)
CO2 SERPL-SCNC: 26 MMOL/L — SIGNIFICANT CHANGE UP (ref 22–31)
CREAT SERPL-MCNC: 1.08 MG/DL — SIGNIFICANT CHANGE UP (ref 0.5–1.3)
EOSINOPHIL # BLD AUTO: 0.1 K/UL — SIGNIFICANT CHANGE UP (ref 0–0.5)
EOSINOPHIL NFR BLD AUTO: 1.4 % — SIGNIFICANT CHANGE UP (ref 0–6)
GLUCOSE SERPL-MCNC: 111 MG/DL — HIGH (ref 70–99)
HCT VFR BLD CALC: 30.4 % — LOW (ref 34.5–45)
HGB BLD-MCNC: 10.2 G/DL — LOW (ref 11.5–15.5)
LYMPHOCYTES # BLD AUTO: 0.6 K/UL — LOW (ref 1–3.3)
LYMPHOCYTES # BLD AUTO: 7.6 % — LOW (ref 13–44)
MCHC RBC-ENTMCNC: 30.3 PG — SIGNIFICANT CHANGE UP (ref 27–34)
MCHC RBC-ENTMCNC: 33.4 GM/DL — SIGNIFICANT CHANGE UP (ref 32–36)
MCV RBC AUTO: 90.7 FL — SIGNIFICANT CHANGE UP (ref 80–100)
MONOCYTES # BLD AUTO: 0.6 K/UL — SIGNIFICANT CHANGE UP (ref 0–0.9)
MONOCYTES NFR BLD AUTO: 7 % — SIGNIFICANT CHANGE UP (ref 2–14)
NEUTROPHILS # BLD AUTO: 6.9 K/UL — SIGNIFICANT CHANGE UP (ref 1.8–7.4)
NEUTROPHILS NFR BLD AUTO: 82.9 % — HIGH (ref 43–77)
PLATELET # BLD AUTO: 252 K/UL — SIGNIFICANT CHANGE UP (ref 150–400)
POTASSIUM SERPL-MCNC: 4.1 MMOL/L — SIGNIFICANT CHANGE UP (ref 3.5–5.3)
POTASSIUM SERPL-SCNC: 4.1 MMOL/L — SIGNIFICANT CHANGE UP (ref 3.5–5.3)
PROT SERPL-MCNC: 7 GM/DL — SIGNIFICANT CHANGE UP (ref 6–8.3)
RBC # BLD: 3.36 M/UL — LOW (ref 3.8–5.2)
RBC # FLD: 12.1 % — SIGNIFICANT CHANGE UP (ref 10.3–14.5)
SODIUM SERPL-SCNC: 141 MMOL/L — SIGNIFICANT CHANGE UP (ref 135–145)
WBC # BLD: 8.4 K/UL — SIGNIFICANT CHANGE UP (ref 3.8–10.5)
WBC # FLD AUTO: 8.4 K/UL — SIGNIFICANT CHANGE UP (ref 3.8–10.5)

## 2018-02-05 PROCEDURE — 93010 ELECTROCARDIOGRAM REPORT: CPT

## 2018-02-05 PROCEDURE — 93010 ELECTROCARDIOGRAM REPORT: CPT | Mod: 77

## 2018-02-05 PROCEDURE — 76377 3D RENDER W/INTRP POSTPROCES: CPT | Mod: 26

## 2018-02-05 PROCEDURE — 99223 1ST HOSP IP/OBS HIGH 75: CPT

## 2018-02-05 PROCEDURE — 73700 CT LOWER EXTREMITY W/O DYE: CPT | Mod: 26,LT

## 2018-02-05 PROCEDURE — 99285 EMERGENCY DEPT VISIT HI MDM: CPT

## 2018-02-05 RX ORDER — MIRTAZAPINE 45 MG/1
45 TABLET, ORALLY DISINTEGRATING ORAL AT BEDTIME
Qty: 0 | Refills: 0 | Status: DISCONTINUED | OUTPATIENT
Start: 2018-02-05 | End: 2018-02-08

## 2018-02-05 RX ORDER — AMLODIPINE BESYLATE 2.5 MG/1
5 TABLET ORAL DAILY
Qty: 0 | Refills: 0 | Status: DISCONTINUED | OUTPATIENT
Start: 2018-02-05 | End: 2018-02-08

## 2018-02-05 RX ORDER — ATORVASTATIN CALCIUM 80 MG/1
10 TABLET, FILM COATED ORAL AT BEDTIME
Qty: 0 | Refills: 0 | Status: DISCONTINUED | OUTPATIENT
Start: 2018-02-05 | End: 2018-02-08

## 2018-02-05 RX ORDER — DOCUSATE SODIUM 100 MG
100 CAPSULE ORAL
Qty: 0 | Refills: 0 | Status: DISCONTINUED | OUTPATIENT
Start: 2018-02-05 | End: 2018-02-08

## 2018-02-05 RX ORDER — HEPARIN SODIUM 5000 [USP'U]/ML
5000 INJECTION INTRAVENOUS; SUBCUTANEOUS EVERY 8 HOURS
Qty: 0 | Refills: 0 | Status: DISCONTINUED | OUTPATIENT
Start: 2018-02-05 | End: 2018-02-08

## 2018-02-05 RX ORDER — DESVENLAFAXINE 50 MG/1
100 TABLET, EXTENDED RELEASE ORAL DAILY
Qty: 0 | Refills: 0 | Status: DISCONTINUED | OUTPATIENT
Start: 2018-02-05 | End: 2018-02-08

## 2018-02-05 RX ORDER — CHOLECALCIFEROL (VITAMIN D3) 125 MCG
2000 CAPSULE ORAL DAILY
Qty: 0 | Refills: 0 | Status: DISCONTINUED | OUTPATIENT
Start: 2018-02-05 | End: 2018-02-08

## 2018-02-05 RX ORDER — ACETAMINOPHEN 500 MG
650 TABLET ORAL EVERY 6 HOURS
Qty: 0 | Refills: 0 | Status: DISCONTINUED | OUTPATIENT
Start: 2018-02-05 | End: 2018-02-08

## 2018-02-05 RX ORDER — METOPROLOL TARTRATE 50 MG
12.5 TABLET ORAL
Qty: 0 | Refills: 0 | Status: DISCONTINUED | OUTPATIENT
Start: 2018-02-05 | End: 2018-02-08

## 2018-02-05 RX ORDER — ASPIRIN/CALCIUM CARB/MAGNESIUM 324 MG
81 TABLET ORAL DAILY
Qty: 0 | Refills: 0 | Status: DISCONTINUED | OUTPATIENT
Start: 2018-02-05 | End: 2018-02-08

## 2018-02-05 RX ORDER — TRAMADOL HYDROCHLORIDE 50 MG/1
50 TABLET ORAL
Qty: 0 | Refills: 0 | Status: DISCONTINUED | OUTPATIENT
Start: 2018-02-05 | End: 2018-02-08

## 2018-02-05 RX ORDER — NORTRIPTYLINE HYDROCHLORIDE 10 MG/1
50 CAPSULE ORAL AT BEDTIME
Qty: 0 | Refills: 0 | Status: DISCONTINUED | OUTPATIENT
Start: 2018-02-05 | End: 2018-02-08

## 2018-02-05 RX ORDER — FERROUS SULFATE 325(65) MG
325 TABLET ORAL
Qty: 0 | Refills: 0 | Status: DISCONTINUED | OUTPATIENT
Start: 2018-02-05 | End: 2018-02-08

## 2018-02-05 RX ORDER — ASPIRIN/CALCIUM CARB/MAGNESIUM 324 MG
81 TABLET ORAL DAILY
Qty: 0 | Refills: 0 | Status: DISCONTINUED | OUTPATIENT
Start: 2018-02-05 | End: 2018-02-05

## 2018-02-05 RX ORDER — LIDOCAINE 4 G/100G
1 CREAM TOPICAL ONCE
Qty: 0 | Refills: 0 | Status: COMPLETED | OUTPATIENT
Start: 2018-02-05 | End: 2018-02-05

## 2018-02-05 RX ORDER — ARIPIPRAZOLE 15 MG/1
5 TABLET ORAL DAILY
Qty: 0 | Refills: 0 | Status: DISCONTINUED | OUTPATIENT
Start: 2018-02-05 | End: 2018-02-08

## 2018-02-05 RX ADMIN — Medication 100 MILLIGRAM(S): at 17:11

## 2018-02-05 RX ADMIN — Medication 12.5 MILLIGRAM(S): at 17:10

## 2018-02-05 RX ADMIN — Medication 81 MILLIGRAM(S): at 11:22

## 2018-02-05 RX ADMIN — LIDOCAINE 1 PATCH: 4 CREAM TOPICAL at 01:04

## 2018-02-05 RX ADMIN — MIRTAZAPINE 45 MILLIGRAM(S): 45 TABLET, ORALLY DISINTEGRATING ORAL at 22:23

## 2018-02-05 RX ADMIN — ARIPIPRAZOLE 5 MILLIGRAM(S): 15 TABLET ORAL at 14:17

## 2018-02-05 RX ADMIN — AMLODIPINE BESYLATE 5 MILLIGRAM(S): 2.5 TABLET ORAL at 17:11

## 2018-02-05 RX ADMIN — Medication 325 MILLIGRAM(S): at 11:22

## 2018-02-05 RX ADMIN — Medication 30 MILLIGRAM(S): at 14:16

## 2018-02-05 RX ADMIN — Medication 2000 UNIT(S): at 11:22

## 2018-02-05 RX ADMIN — HEPARIN SODIUM 5000 UNIT(S): 5000 INJECTION INTRAVENOUS; SUBCUTANEOUS at 22:21

## 2018-02-05 RX ADMIN — HEPARIN SODIUM 5000 UNIT(S): 5000 INJECTION INTRAVENOUS; SUBCUTANEOUS at 14:23

## 2018-02-05 RX ADMIN — Medication 325 MILLIGRAM(S): at 17:12

## 2018-02-05 RX ADMIN — ATORVASTATIN CALCIUM 10 MILLIGRAM(S): 80 TABLET, FILM COATED ORAL at 22:21

## 2018-02-05 NOTE — H&P ADULT - HISTORY OF PRESENT ILLNESS
HPI : Pt. is a 89 yo F with a hx of depression and anxiety, left hip surgery, HTN, hyperlipidemia, recent left forearm fracture and ORIF repair BIB ambulance after mechanical fall tripping on a bunched up rug on the floor.  Pt. was walking into the bathroom with a splint on her left arm, tripped on the rug and fell into the wall, then to the floor on her left leg.  Pt. denies head hitting floor.  Denies LOC.  Complains of pain in left knee.  Had trouble walking due to the pain.  Takes aspirin 81 mg daily.  No other injury. Found to have tibial plateau fracture and advised non-weight bearing.	    Active Problems  Aortic valve disorder (424.1) (I35.9)  Bilateral edema of lower extremity (782.3) (R60.0)  CAD (coronary artery disease), native coronary artery (414.01) (I25.10)  Closed fracture of shaft of left radius and ulna, initial encounter (813.23)  (S52.202A,S52.302A)  Essential hypertension (401.9) (I10)  Hematochezia not due to hemorrhage from anus (578.1) (K92.1)  History of depression (V11.8) (Z86.59)  Hyperlipidemia (272.4) (E78.5)  Left bundle branch block (LBBB) (426.3) (I44.7)  Visual hallucinations (368.16) (R44.1)    Past Medical History  CAD (coronary artery disease), native coronary artery (414.01) (I25.10)  Essential hypertension (401.9) (I10)  History of deep venous thrombosis (V12.51) (Z86.718)  History of depression (V11.8) (Z86.59)  History of non-ST elevation myocardial infarction (NSTEMI) (412) (I25.2)  Hyperlipidemia (272.4) (E78.5)    Surgical History  History of Cardiac Cath Procedure Summary  History of Total Hip Replacement    Family History  Family history of CVA (cerebrovascular accident due to intracerebral hemorrhage) :  Brother  Family history of  : Mother, Father  Family history of alcoholism (V17.0) (Z81.1) : Brother  Family history of cardiac disorder (V17.49) (Z82.49) : Uncle  Family history of colon cancer (V16.0) (Z80.0) : Uncle  Family history of diabetes mellitus (V18.0) (Z83.3) : Mother  Family history of hyperlipidemia (V18.19) (Z83.49) : Son    Social History    Never a smoker  No alcohol use  No drug use  Occupation

## 2018-02-05 NOTE — ED ADULT NURSE REASSESSMENT NOTE - NS ED NURSE REASSESS COMMENT FT1
Patient received in bed 16. Alert and oriented. Requesting water to drink. Patient cleared by ortho with no intervention warranted. Awaiting admission orders.

## 2018-02-05 NOTE — H&P ADULT - ASSESSMENT
89 yo F with a hx of depression and anxiety, left hip surgery, HTN, hyperlipidemia, recent left forearm fracture and ORIF repair BIB ambulance after mechanical fall     Assessment:  Tibial Plateau Fx left leg  Recent Wrist Fx s/p ORIF  Multiple Falls Recently  HTN  Severe Depression controlled with Medication  Mild anemia secondary to recent surgery  H/o ASHD and LBBB  Hyperlipidemia    Plan:  Admit to orthopedic unit bed on medicine service  Low sodium low chol diet  PT  Non weight bearing left leg  left knee immobilizer  Splint to left wrist  Social Service Consult  Will require rehab transfer  Continue outpatient medications  Increase vit D dose  Vitamin D level  Ortho following  Further Tx as her condition warrants    DVT Prophylaxis:  Heparin SQ    Advanced Directives:  Full Code

## 2018-02-05 NOTE — ED ADULT NURSE REASSESSMENT NOTE - NS ED NURSE REASSESS COMMENT FT1
pt evaluated by ortho- lidocaine patch found at bedside bedside, immobilizer in place, IVL palced to RT FA#[id number],labs drawn. Pt admitted, social work consult to be placed.

## 2018-02-05 NOTE — PHYSICAL THERAPY INITIAL EVALUATION ADULT - DIAGNOSIS, PT EVAL
89 y.o. female s/p fall resulting in left tibial plateau fx - NWB in a Knee immobilizer, + subacute left distal radius/ulnar fracture - splinted NWB

## 2018-02-05 NOTE — CONSULT NOTE ADULT - SUBJECTIVE AND OBJECTIVE BOX
89y Female community ambulatory presents c/o L knee pain and inability to ambulate sp mechanical fall at home. She states she tripped on a rug, and fell into the wall, twisted her knee and then fell to the floor. Denies HS/LOC. Denies numbness/tingling. Denies fever/chills. Denies pain/injury elsewhere.     HEALTH ISSUES - PROBLEM Dx:  HTN, HLD, Depression, S/p L BBFA Fx ORIF      MEDICATIONS  (STANDING):    Allergies    codeine (Unknown)      Vital Signs Last 24 Hrs  T(C): 36.7 (02-04-18 @ 22:40), Max: 36.7 (02-04-18 @ 22:40)  T(F): 98.1 (02-04-18 @ 22:40), Max: 98.1 (02-04-18 @ 22:40)  HR: 96 (02-04-18 @ 22:40) (96 - 96)  BP: 158/76 (02-04-18 @ 22:40) (158/76 - 158/76)  BP(mean): --  RR: 17 (02-04-18 @ 22:40) (17 - 17)  SpO2: 94% (02-04-18 @ 22:40) (94% - 94%)    Imaging: XR neg for fx  CT LUE prelim read: questionable non-displaced lateral tib plateau fracture    Physical Exam  Gen: NAD  LLE: skin intact no erythema/ecchymosis, no ttp along joint line, able to SLR, able to flex knee to 80 degrees with some discomfort, neg log roll, +ehl/fhl/ta/gs function, SILT L3-S1, no calf ttp, dp/pt pulse intact, compartments soft  Secondary survey: benign, nv intact, able to SLR contralateral leg, negative log roll contralateral leg, no bony ttp elsewhere    A/P: 89y Female with L knee injury poss non-displaced tibial plateau fracture  Pain control  NWB in knee immobilizer  FU labs/imaging  Check vitamin D levels  Ca/Vit D supplementation  Outpt osteoporosis workup  Admit to medical team  No further acute ortho intervention indicated, may f/u as outpt  Will discuss with attending

## 2018-02-05 NOTE — PHYSICAL THERAPY INITIAL EVALUATION ADULT - GENERAL OBSERVATIONS, REHAB EVAL
The pt was pleasant and cooperative, received on 2N, supine and eager to participate with PT. The pt had 1 distal left UE wrist splint in place and 1 left knee immobilizer in place over ace bandaging.

## 2018-02-05 NOTE — H&P ADULT - NSHPREVIEWOFSYSTEMS_GEN_ALL_CORE
CONSTITUTIONAL: No weakness, fevers or chills  EYES/ENT: No visual changes;  No vertigo or throat pain   NECK: No pain or stiffness  RESPIRATORY: No cough, wheezing, hemoptysis; No shortness of breath  CARDIOVASCULAR: No chest pain or palpitations  GASTROINTESTINAL: No abdominal or epigastric pain. No nausea, vomiting, or hematemesis; No diarrhea or constipation. No melena or hematochezia.  GENITOURINARY: No dysuria, frequency or hematuria  NEUROLOGICAL: No numbness or weakness  SKIN: No itching, burning, rashes, or lesions   All other review of systems is negative unless indicated above

## 2018-02-05 NOTE — PHYSICAL THERAPY INITIAL EVALUATION ADULT - PERTINENT HX OF CURRENT PROBLEM, REHAB EVAL
Pt. is a 89 yo F with a hx of depression and anxiety, left hip surgery, HTN, hyperlipidemia, recent left forearm fracture and ORIF repair BIB ambulance after mechanical fall tripping on a bunched up rug on the floor.  Pt. was walking into the bathroom with a splint on her left arm, tripped on the rug and fell into the wall, then to the floor on her left leg. Found to have left tibial plateau fracture and advised non-weight bearing.

## 2018-02-05 NOTE — ED ADULT NURSE REASSESSMENT NOTE - NS ED NURSE REASSESS COMMENT FT1
xrays negative for fx- pt difficulty ambulating with assist due to knee pain,  aware with orders for lidocaine patch and further imaging. Pt remains in no acute distress.

## 2018-02-05 NOTE — H&P ADULT - NSHPPHYSICALEXAM_GEN_ALL_CORE
Vital Signs Last 24 Hrs  T(C): 36.9 (05 Feb 2018 06:42), Max: 36.9 (05 Feb 2018 06:42)  T(F): 98.4 (05 Feb 2018 06:42), Max: 98.4 (05 Feb 2018 06:42)  HR: 91 (05 Feb 2018 06:42) (90 - 96)  BP: 141/72 (05 Feb 2018 06:42) (141/72 - 158/76)  BP(mean): --  RR: 18 (05 Feb 2018 06:42) (17 - 18)  SpO2: 94% (05 Feb 2018 05:32) (94% - 94%)  Constitutional: NAD, awake and alert, well-developed  HEENT: PERRLA, EOMI, Pharynx Clear  Neck: Supple, No JVD, No Lymphadenopathy  Respiratory: Breath sounds are clear bilaterally, No wheezing, rales or rhonchi  Cardiovascular: S1 and S2, regular rate and rhythm, no Murmurs, rubs or gallops  Gastrointestinal: Bowel Sounds present, soft, nontender. No Hepatosplenomegaly. No masses  Extremities: Splint to left forearm and wrist non tender, knee immobilizer to left leg, no C/C/E. FROM of other extremities  Vascular: 2+ peripheral pulses  Neurological: A/O x 3, no focal deficits  Skin: No rashes

## 2018-02-05 NOTE — H&P ADULT - NSHPLABSRESULTS_GEN_ALL_CORE
10.2   8.4   )-----------( 252      ( 05 Feb 2018 05:27 )             30.4     141  |  107  |  15  ----------------------------<  111<H>  4.1   |  26  |  1.08    Ca    8.9      05 Feb 2018 05:27  TPro  7.0  /  Alb  3.3  /  TBili  0.5  /  DBili  x   /  AST  19  /  ALT  15  /  AlkPhos  106  02-05    CT of lower Extremity  Tibial Plateau Fx

## 2018-02-05 NOTE — PHYSICAL THERAPY INITIAL EVALUATION ADULT - MODALITIES TREATMENT COMMENTS
The pt was left sitting OOB and in a chair, adjusted for comfort with pillows and a stool. Chair alarm secured, cb, tray and phone in place at end of tx. The pt was in NAD at end of tx.

## 2018-02-05 NOTE — PHYSICAL THERAPY INITIAL EVALUATION ADULT - ASR EQUIP NEEDS DISCH PT EVAL
gait belt/Left sided platform walker,- the platform itself will have to be short due to the pt's height./3:1 commode

## 2018-02-06 LAB
ANION GAP SERPL CALC-SCNC: 7 MMOL/L — SIGNIFICANT CHANGE UP (ref 5–17)
BASOPHILS # BLD AUTO: 0.1 K/UL — SIGNIFICANT CHANGE UP (ref 0–0.2)
BASOPHILS NFR BLD AUTO: 1.3 % — SIGNIFICANT CHANGE UP (ref 0–2)
BUN SERPL-MCNC: 10 MG/DL — SIGNIFICANT CHANGE UP (ref 7–23)
CALCIUM SERPL-MCNC: 8.6 MG/DL — SIGNIFICANT CHANGE UP (ref 8.5–10.1)
CHLORIDE SERPL-SCNC: 107 MMOL/L — SIGNIFICANT CHANGE UP (ref 96–108)
CO2 SERPL-SCNC: 25 MMOL/L — SIGNIFICANT CHANGE UP (ref 22–31)
CREAT SERPL-MCNC: 1.05 MG/DL — SIGNIFICANT CHANGE UP (ref 0.5–1.3)
EOSINOPHIL # BLD AUTO: 0.3 K/UL — SIGNIFICANT CHANGE UP (ref 0–0.5)
EOSINOPHIL NFR BLD AUTO: 4.3 % — SIGNIFICANT CHANGE UP (ref 0–6)
GLUCOSE SERPL-MCNC: 91 MG/DL — SIGNIFICANT CHANGE UP (ref 70–99)
HCT VFR BLD CALC: 31.3 % — LOW (ref 34.5–45)
HGB BLD-MCNC: 10.4 G/DL — LOW (ref 11.5–15.5)
LYMPHOCYTES # BLD AUTO: 1 K/UL — SIGNIFICANT CHANGE UP (ref 1–3.3)
LYMPHOCYTES # BLD AUTO: 15.5 % — SIGNIFICANT CHANGE UP (ref 13–44)
MAGNESIUM SERPL-MCNC: 2.1 MG/DL — SIGNIFICANT CHANGE UP (ref 1.6–2.6)
MCHC RBC-ENTMCNC: 30.2 PG — SIGNIFICANT CHANGE UP (ref 27–34)
MCHC RBC-ENTMCNC: 33.1 GM/DL — SIGNIFICANT CHANGE UP (ref 32–36)
MCV RBC AUTO: 91.3 FL — SIGNIFICANT CHANGE UP (ref 80–100)
MONOCYTES # BLD AUTO: 0.5 K/UL — SIGNIFICANT CHANGE UP (ref 0–0.9)
MONOCYTES NFR BLD AUTO: 7.1 % — SIGNIFICANT CHANGE UP (ref 2–14)
NEUTROPHILS # BLD AUTO: 4.9 K/UL — SIGNIFICANT CHANGE UP (ref 1.8–7.4)
NEUTROPHILS NFR BLD AUTO: 71.9 % — SIGNIFICANT CHANGE UP (ref 43–77)
PHOSPHATE SERPL-MCNC: 3.3 MG/DL — SIGNIFICANT CHANGE UP (ref 2.5–4.5)
PLATELET # BLD AUTO: 242 K/UL — SIGNIFICANT CHANGE UP (ref 150–400)
POTASSIUM SERPL-MCNC: 3.6 MMOL/L — SIGNIFICANT CHANGE UP (ref 3.5–5.3)
POTASSIUM SERPL-SCNC: 3.6 MMOL/L — SIGNIFICANT CHANGE UP (ref 3.5–5.3)
RBC # BLD: 3.43 M/UL — LOW (ref 3.8–5.2)
RBC # FLD: 11.7 % — SIGNIFICANT CHANGE UP (ref 10.3–14.5)
SODIUM SERPL-SCNC: 139 MMOL/L — SIGNIFICANT CHANGE UP (ref 135–145)
WBC # BLD: 6.8 K/UL — SIGNIFICANT CHANGE UP (ref 3.8–10.5)
WBC # FLD AUTO: 6.8 K/UL — SIGNIFICANT CHANGE UP (ref 3.8–10.5)

## 2018-02-06 PROCEDURE — 99232 SBSQ HOSP IP/OBS MODERATE 35: CPT

## 2018-02-06 RX ADMIN — Medication 30 MILLIGRAM(S): at 11:29

## 2018-02-06 RX ADMIN — Medication 12.5 MILLIGRAM(S): at 07:01

## 2018-02-06 RX ADMIN — HEPARIN SODIUM 5000 UNIT(S): 5000 INJECTION INTRAVENOUS; SUBCUTANEOUS at 07:01

## 2018-02-06 RX ADMIN — HEPARIN SODIUM 5000 UNIT(S): 5000 INJECTION INTRAVENOUS; SUBCUTANEOUS at 13:19

## 2018-02-06 RX ADMIN — Medication 12.5 MILLIGRAM(S): at 17:08

## 2018-02-06 RX ADMIN — Medication 2000 UNIT(S): at 11:30

## 2018-02-06 RX ADMIN — AMLODIPINE BESYLATE 5 MILLIGRAM(S): 2.5 TABLET ORAL at 07:01

## 2018-02-06 RX ADMIN — Medication 81 MILLIGRAM(S): at 11:30

## 2018-02-06 RX ADMIN — Medication 100 MILLIGRAM(S): at 07:01

## 2018-02-06 RX ADMIN — DESVENLAFAXINE 100 MILLIGRAM(S): 50 TABLET, EXTENDED RELEASE ORAL at 19:31

## 2018-02-06 RX ADMIN — Medication 325 MILLIGRAM(S): at 07:41

## 2018-02-06 RX ADMIN — NORTRIPTYLINE HYDROCHLORIDE 50 MILLIGRAM(S): 10 CAPSULE ORAL at 22:14

## 2018-02-06 RX ADMIN — NORTRIPTYLINE HYDROCHLORIDE 50 MILLIGRAM(S): 10 CAPSULE ORAL at 00:03

## 2018-02-06 RX ADMIN — Medication 325 MILLIGRAM(S): at 17:08

## 2018-02-06 RX ADMIN — Medication 325 MILLIGRAM(S): at 13:22

## 2018-02-06 RX ADMIN — HEPARIN SODIUM 5000 UNIT(S): 5000 INJECTION INTRAVENOUS; SUBCUTANEOUS at 22:07

## 2018-02-06 RX ADMIN — ATORVASTATIN CALCIUM 10 MILLIGRAM(S): 80 TABLET, FILM COATED ORAL at 22:07

## 2018-02-06 RX ADMIN — MIRTAZAPINE 45 MILLIGRAM(S): 45 TABLET, ORALLY DISINTEGRATING ORAL at 22:07

## 2018-02-06 RX ADMIN — ARIPIPRAZOLE 5 MILLIGRAM(S): 15 TABLET ORAL at 11:30

## 2018-02-06 NOTE — PROGRESS NOTE ADULT - SUBJECTIVE AND OBJECTIVE BOX
Pt S/E at bedside, no acute events overnight, pain controlled    AVSS  Gen: NAD, AAOx3    Left Lower Extremity:  In BJKI  +EHL/FHL/TA/GS  SILT L3-S1  +DP/PT Pulses  Compartments soft  No calf TTP B/L    Left Upper Extremity:  Splint intact  +AIN/PIN/M/R/U/Msc/Ax  SILT C5-T1  +Radial Pulse  Compartments soft  No calf TTP B/L

## 2018-02-06 NOTE — PROGRESS NOTE ADULT - SUBJECTIVE AND OBJECTIVE BOX
SUBJECTIVE:    CHIEF COMPLAINT:  Patient is a 89y old  Female who presents with a chief complaint of I fell at home- I tripped over a rug and twisted my knee (2018 11:06)    HPI:  HPI : Pt. is a 89 yo F with a hx of depression and anxiety, left hip surgery, HTN, hyperlipidemia, recent left forearm fracture and ORIF repair BIB ambulance after mechanical fall tripping on a bunched up rug on the floor.  Pt. was walking into the bathroom with a splint on her left arm, tripped on the rug and fell into the wall, then to the floor on her left leg.  Pt. denies head hitting floor.  Denies LOC.  Complains of pain in left knee.  Had trouble walking due to the pain.  Takes aspirin 81 mg daily.  No other injury. Found to have tibial plateau fracture and advised non-weight bearing.	    Active Problems  Aortic valve disorder (424.1) (I35.9)  Bilateral edema of lower extremity (782.3) (R60.0)  CAD (coronary artery disease), native coronary artery (414.01) (I25.10)  Closed fracture of shaft of left radius and ulna, initial encounter (813.23)  (S52.202A,S52.302A)  Essential hypertension (401.9) (I10)  Hematochezia not due to hemorrhage from anus (578.1) (K92.1)  History of depression (V11.8) (Z86.59)  Hyperlipidemia (272.4) (E78.5)  Left bundle branch block (LBBB) (426.3) (I44.7)  Visual hallucinations (368.16) (R44.1)    Past Medical History  CAD (coronary artery disease), native coronary artery (414.01) (I25.10)  Essential hypertension (401.9) (I10)  History of deep venous thrombosis (V12.51) (Z86.718)  History of depression (V11.8) (Z86.59)  History of non-ST elevation myocardial infarction (NSTEMI) (412) (I25.2)  Hyperlipidemia (272.4) (E78.5)    Surgical History  History of Cardiac Cath Procedure Summary  History of Total Hip Replacement    Family History  Family history of CVA (cerebrovascular accident due to intracerebral hemorrhage) :  Brother  Family history of  : Mother, Father  Family history of alcoholism (V17.0) (Z81.1) : Brother  Family history of cardiac disorder (V17.49) (Z82.49) : Uncle  Family history of colon cancer (V16.0) (Z80.0) : Uncle  Family history of diabetes mellitus (V18.0) (Z83.3) : Mother  Family history of hyperlipidemia (V18.19) (Z83.49) : Son    Social History    Never a smoker  No alcohol use  No drug use  Occupation (2018 08:16)      Interval HPI and Overnight Events: Tolerating weight bearing with walker and knee immobilizer.    REVIEW OF SYSTEMS:  CONSTITUTIONAL: No weakness, fevers or chills  EYES/ENT: No visual changes;  No vertigo or throat pain   NECK: No pain or stiffness  RESPIRATORY: No cough, wheezing, hemoptysis; No shortness of breath  CARDIOVASCULAR: No chest pain or palpitations  GASTROINTESTINAL: No abdominal or epigastric pain. No nausea, vomiting, or hematemesis; No diarrhea or constipation. No melena or hematochezia.  GENITOURINARY: No dysuria, frequency or hematuria  NEUROLOGICAL: No numbness or weakness  SKIN: No itching, burning, rashes, or lesions   All other review of systems is negative unless indicated above    OBJECTIVE    Vital Signs Last 24 Hrs  T(C): 36.9 (2018 17:25), Max: 36.9 (2018 17:43)  T(F): 98.5 (2018 17:25), Max: 98.5 (2018 17:25)  HR: 86 (2018 17:25) (83 - 92)  BP: 129/63 (2018 17:25) (95/55 - 146/86)  BP(mean): 64 (2018 11:21) (64 - 64)  RR: 16 (2018 17:25) (16 - 18)  SpO2: 95% (2018 17:25) (90% - 100%)    MEDICATIONS  (STANDING):  amLODIPine   Tablet 5 milliGRAM(s) Oral daily  ARIPiprazole 5 milliGRAM(s) Oral daily  aspirin  chewable 81 milliGRAM(s) Oral daily  atorvastatin 10 milliGRAM(s) Oral at bedtime  busPIRone 30 milliGRAM(s) Oral daily  cholecalciferol 2000 Unit(s) Oral daily  desvenlafaxine  milliGRAM(s) Oral daily  docusate sodium 100 milliGRAM(s) Oral two times a day  ferrous    sulfate 325 milliGRAM(s) Oral three times a day with meals  heparin  Injectable 5000 Unit(s) SubCutaneous every 8 hours  metoprolol     tartrate 12.5 milliGRAM(s) Oral two times a day  mirtazapine 45 milliGRAM(s) Oral at bedtime  nortriptyline 50 milliGRAM(s) Oral at bedtime      LABS:                         10.4   6.8   )-----------( 242      ( 2018 06:13 )             31.3     02-06    139  |  107  |  10  ----------------------------<  91  3.6   |  25  |  1.05    Ca    8.6      2018 06:13  Phos  3.3     02-06  Mg     2.1     02-06    TPro  7.0  /  Alb  3.3  /  TBili  0.5  /  DBili  x   /  AST  19  /  ALT  15  /  AlkPhos  106  02-05
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
## 2018-02-07 DIAGNOSIS — S52.202A UNSPECIFIED FRACTURE OF SHAFT OF LEFT ULNA, INITIAL ENCOUNTER FOR CLOSED FRACTURE: ICD-10-CM

## 2018-02-07 DIAGNOSIS — E78.5 HYPERLIPIDEMIA, UNSPECIFIED: ICD-10-CM

## 2018-02-07 DIAGNOSIS — I44.7 LEFT BUNDLE-BRANCH BLOCK, UNSPECIFIED: ICD-10-CM
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
## 2018-02-07 DIAGNOSIS — I10 ESSENTIAL (PRIMARY) HYPERTENSION: ICD-10-CM

## 2018-02-07 DIAGNOSIS — Z88.5 ALLERGY STATUS TO NARCOTIC AGENT: ICD-10-CM

## 2018-02-07 DIAGNOSIS — I25.10 ATHEROSCLEROTIC HEART DISEASE OF NATIVE CORONARY ARTERY WITHOUT ANGINA PECTORIS: ICD-10-CM
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
## 2018-02-07 DIAGNOSIS — I35.9 NONRHEUMATIC AORTIC VALVE DISORDER, UNSPECIFIED: ICD-10-CM

## 2018-02-07 DIAGNOSIS — S52.302A UNSPECIFIED FRACTURE OF SHAFT OF LEFT RADIUS, INITIAL ENCOUNTER FOR CLOSED FRACTURE: ICD-10-CM

## 2018-02-07 DIAGNOSIS — F32.9 MAJOR DEPRESSIVE DISORDER, SINGLE EPISODE, UNSPECIFIED: ICD-10-CM

## 2018-02-07 PROCEDURE — 99232 SBSQ HOSP IP/OBS MODERATE 35: CPT

## 2018-02-07 RX ADMIN — DESVENLAFAXINE 100 MILLIGRAM(S): 50 TABLET, EXTENDED RELEASE ORAL at 12:01

## 2018-02-07 RX ADMIN — Medication 2000 UNIT(S): at 12:00

## 2018-02-07 RX ADMIN — Medication 30 MILLIGRAM(S): at 12:00

## 2018-02-07 RX ADMIN — HEPARIN SODIUM 5000 UNIT(S): 5000 INJECTION INTRAVENOUS; SUBCUTANEOUS at 13:45

## 2018-02-07 RX ADMIN — Medication 325 MILLIGRAM(S): at 12:00

## 2018-02-07 RX ADMIN — ATORVASTATIN CALCIUM 10 MILLIGRAM(S): 80 TABLET, FILM COATED ORAL at 22:00

## 2018-02-07 RX ADMIN — HEPARIN SODIUM 5000 UNIT(S): 5000 INJECTION INTRAVENOUS; SUBCUTANEOUS at 05:52

## 2018-02-07 RX ADMIN — MIRTAZAPINE 45 MILLIGRAM(S): 45 TABLET, ORALLY DISINTEGRATING ORAL at 22:00

## 2018-02-07 RX ADMIN — ARIPIPRAZOLE 5 MILLIGRAM(S): 15 TABLET ORAL at 11:59

## 2018-02-07 RX ADMIN — HEPARIN SODIUM 5000 UNIT(S): 5000 INJECTION INTRAVENOUS; SUBCUTANEOUS at 22:01

## 2018-02-07 RX ADMIN — AMLODIPINE BESYLATE 5 MILLIGRAM(S): 2.5 TABLET ORAL at 05:52

## 2018-02-07 RX ADMIN — NORTRIPTYLINE HYDROCHLORIDE 50 MILLIGRAM(S): 10 CAPSULE ORAL at 22:01

## 2018-02-07 RX ADMIN — Medication 12.5 MILLIGRAM(S): at 17:54

## 2018-02-07 RX ADMIN — Medication 81 MILLIGRAM(S): at 11:59

## 2018-02-07 RX ADMIN — Medication 12.5 MILLIGRAM(S): at 05:52

## 2018-02-07 NOTE — PROGRESS NOTE ADULT - SUBJECTIVE AND OBJECTIVE BOX
SUBJECTIVE:    CHIEF COMPLAINT:  Patient is a 89y old  Female who presents with a chief complaint of I fell at home- I tripped over a rug and twisted my knee (2018 11:06)      HPI:  HPI : Pt. is a 89 yo F with a hx of depression and anxiety, left hip surgery, HTN, hyperlipidemia, recent left forearm fracture and ORIF repair BIB ambulance after mechanical fall tripping on a bunched up rug on the floor.  Pt. was walking into the bathroom with a splint on her left arm, tripped on the rug and fell into the wall, then to the floor on her left leg.  Pt. denies head hitting floor.  Denies LOC.  Complains of pain in left knee.  Had trouble walking due to the pain.  Takes aspirin 81 mg daily.  No other injury. Found to have tibial plateau fracture and advised non-weight bearing.	    Active Problems  Aortic valve disorder (424.1) (I35.9)  Bilateral edema of lower extremity (782.3) (R60.0)  CAD (coronary artery disease), native coronary artery (414.01) (I25.10)  Closed fracture of shaft of left radius and ulna, initial encounter (813.23)  (S52.202A,S52.302A)  Essential hypertension (401.9) (I10)  Hematochezia not due to hemorrhage from anus (578.1) (K92.1)  History of depression (V11.8) (Z86.59)  Hyperlipidemia (272.4) (E78.5)  Left bundle branch block (LBBB) (426.3) (I44.7)  Visual hallucinations (368.16) (R44.1)    Past Medical History  CAD (coronary artery disease), native coronary artery (414.01) (I25.10)  Essential hypertension (401.9) (I10)  History of deep venous thrombosis (V12.51) (Z86.718)  History of depression (V11.8) (Z86.59)  History of non-ST elevation myocardial infarction (NSTEMI) (412) (I25.2)  Hyperlipidemia (272.4) (E78.5)    Surgical History  History of Cardiac Cath Procedure Summary  History of Total Hip Replacement    Family History  Family history of CVA (cerebrovascular accident due to intracerebral hemorrhage) :  Brother  Family history of  : Mother, Father  Family history of alcoholism (V17.0) (Z81.1) : Brother  Family history of cardiac disorder (V17.49) (Z82.49) : Uncle  Family history of colon cancer (V16.0) (Z80.0) : Uncle  Family history of diabetes mellitus (V18.0) (Z83.3) : Mother  Family history of hyperlipidemia (V18.19) (Z83.49) : Son    Social History    Never a smoker  No alcohol use  No drug use  Occupation (2018 08:16)      Interval HPI and Overnight Events: Doing well with PT. No complaints    REVIEW OF SYSTEMS:  CONSTITUTIONAL: No weakness, fevers or chills  EYES/ENT: No visual changes;  No vertigo or throat pain   NECK: No pain or stiffness  RESPIRATORY: No cough, wheezing, hemoptysis; No shortness of breath  CARDIOVASCULAR: No chest pain or palpitations  GASTROINTESTINAL: No abdominal or epigastric pain. No nausea, vomiting, or hematemesis; No diarrhea or constipation. No melena or hematochezia.  GENITOURINARY: No dysuria, frequency or hematuria  NEUROLOGICAL: No numbness or weakness  SKIN: No itching, burning, rashes, or lesions   All other review of systems is negative unless indicated above    OBJECTIVE    Vital Signs Last 24 Hrs  T(C): 36.7 (2018 05:19), Max: 36.9 (2018 17:25)  T(F): 98 (2018 05:19), Max: 98.5 (2018 17:25)  HR: 87 (2018 05:19) (83 - 105)  BP: 140/60 (2018 05:19) (95/55 - 144/64)  BP(mean): 64 (2018 11:21) (64 - 64)  RR: 16 (2018 05:19) (16 - 16)  SpO2: 93% (2018 05:19) (93% - 100%)    MEDICATIONS  (STANDING):  amLODIPine   Tablet 5 milliGRAM(s) Oral daily  ARIPiprazole 5 milliGRAM(s) Oral daily  aspirin  chewable 81 milliGRAM(s) Oral daily  atorvastatin 10 milliGRAM(s) Oral at bedtime  busPIRone 30 milliGRAM(s) Oral daily  cholecalciferol 2000 Unit(s) Oral daily  desvenlafaxine  milliGRAM(s) Oral daily  docusate sodium 100 milliGRAM(s) Oral two times a day  ferrous    sulfate 325 milliGRAM(s) Oral three times a day with meals  heparin  Injectable 5000 Unit(s) SubCutaneous every 8 hours  metoprolol     tartrate 12.5 milliGRAM(s) Oral two times a day  mirtazapine 45 milliGRAM(s) Oral at bedtime  nortriptyline 50 milliGRAM(s) Oral at bedtime      LABS:                         10.4   6.8   )-----------( 242      ( 2018 06:13 )             31.3     02-06    139  |  107  |  10  ----------------------------<  91  3.6   |  25  |  1.05    Ca    8.6      2018 06:13  Phos  3.3     02-06  Mg     2.1     02-06

## 2018-02-08 ENCOUNTER — TRANSCRIPTION ENCOUNTER (OUTPATIENT)
Age: 83
End: 2018-02-08

## 2018-02-08 VITALS
OXYGEN SATURATION: 97 % | RESPIRATION RATE: 16 BRPM | TEMPERATURE: 98 F | SYSTOLIC BLOOD PRESSURE: 118 MMHG | HEART RATE: 79 BPM | DIASTOLIC BLOOD PRESSURE: 56 MMHG

## 2018-02-08 PROCEDURE — 99239 HOSP IP/OBS DSCHRG MGMT >30: CPT

## 2018-02-08 RX ORDER — ATORVASTATIN CALCIUM 80 MG/1
0 TABLET, FILM COATED ORAL
Qty: 0 | Refills: 0 | COMMUNITY

## 2018-02-08 RX ORDER — AMLODIPINE BESYLATE 2.5 MG/1
1 TABLET ORAL
Qty: 0 | Refills: 0 | DISCHARGE
Start: 2018-02-08

## 2018-02-08 RX ORDER — HEPARIN SODIUM 5000 [USP'U]/ML
5000 INJECTION INTRAVENOUS; SUBCUTANEOUS
Qty: 0 | Refills: 0 | COMMUNITY
Start: 2018-02-08

## 2018-02-08 RX ORDER — AMLODIPINE BESYLATE 2.5 MG/1
0 TABLET ORAL
Qty: 0 | Refills: 0 | COMMUNITY

## 2018-02-08 RX ORDER — DESVENLAFAXINE 50 MG/1
0 TABLET, EXTENDED RELEASE ORAL
Qty: 0 | Refills: 0 | COMMUNITY

## 2018-02-08 RX ORDER — ASPIRIN/CALCIUM CARB/MAGNESIUM 324 MG
81 TABLET ORAL
Qty: 0 | Refills: 0 | COMMUNITY

## 2018-02-08 RX ORDER — DESVENLAFAXINE 50 MG/1
1 TABLET, EXTENDED RELEASE ORAL
Qty: 0 | Refills: 0 | COMMUNITY
Start: 2018-02-08

## 2018-02-08 RX ORDER — ARIPIPRAZOLE 15 MG/1
1 TABLET ORAL
Qty: 0 | Refills: 0 | COMMUNITY
Start: 2018-02-08

## 2018-02-08 RX ORDER — ASPIRIN/CALCIUM CARB/MAGNESIUM 324 MG
1 TABLET ORAL
Qty: 0 | Refills: 0 | COMMUNITY
Start: 2018-02-08

## 2018-02-08 RX ORDER — NORTRIPTYLINE HYDROCHLORIDE 10 MG/1
0 CAPSULE ORAL
Qty: 0 | Refills: 0 | COMMUNITY

## 2018-02-08 RX ORDER — CHOLECALCIFEROL (VITAMIN D3) 125 MCG
1 CAPSULE ORAL
Qty: 0 | Refills: 0 | COMMUNITY
Start: 2018-02-08

## 2018-02-08 RX ORDER — METOPROLOL TARTRATE 50 MG
0.5 TABLET ORAL
Qty: 0 | Refills: 0 | DISCHARGE
Start: 2018-02-08

## 2018-02-08 RX ORDER — ATORVASTATIN CALCIUM 80 MG/1
1 TABLET, FILM COATED ORAL
Qty: 0 | Refills: 0 | DISCHARGE
Start: 2018-02-08

## 2018-02-08 RX ORDER — TRAMADOL HYDROCHLORIDE 50 MG/1
1 TABLET ORAL
Qty: 0 | Refills: 0 | COMMUNITY
Start: 2018-02-08

## 2018-02-08 RX ORDER — MIRTAZAPINE 45 MG/1
1 TABLET, ORALLY DISINTEGRATING ORAL
Qty: 0 | Refills: 0 | DISCHARGE
Start: 2018-02-08

## 2018-02-08 RX ORDER — METOPROLOL TARTRATE 50 MG
1 TABLET ORAL
Qty: 0 | Refills: 0 | COMMUNITY
Start: 2018-02-08

## 2018-02-08 RX ORDER — METOPROLOL TARTRATE 50 MG
0 TABLET ORAL
Qty: 0 | Refills: 0 | COMMUNITY

## 2018-02-08 RX ORDER — ACETAMINOPHEN 500 MG
2 TABLET ORAL
Qty: 0 | Refills: 0 | COMMUNITY
Start: 2018-02-08

## 2018-02-08 RX ORDER — ARIPIPRAZOLE 15 MG/1
0 TABLET ORAL
Qty: 0 | Refills: 0 | COMMUNITY

## 2018-02-08 RX ORDER — METOPROLOL TARTRATE 50 MG
0.5 TABLET ORAL
Qty: 0 | Refills: 0 | COMMUNITY
Start: 2018-02-08

## 2018-02-08 RX ORDER — NORTRIPTYLINE HYDROCHLORIDE 10 MG/1
1 CAPSULE ORAL
Qty: 0 | Refills: 0 | DISCHARGE
Start: 2018-02-08

## 2018-02-08 RX ORDER — MIRTAZAPINE 45 MG/1
0 TABLET, ORALLY DISINTEGRATING ORAL
Qty: 0 | Refills: 0 | COMMUNITY

## 2018-02-08 RX ORDER — CHOLECALCIFEROL (VITAMIN D3) 125 MCG
0 CAPSULE ORAL
Qty: 0 | Refills: 0 | COMMUNITY

## 2018-02-08 RX ORDER — METOPROLOL TARTRATE 50 MG
1 TABLET ORAL
Qty: 0 | Refills: 0 | DISCHARGE
Start: 2018-02-08

## 2018-02-08 RX ADMIN — DESVENLAFAXINE 100 MILLIGRAM(S): 50 TABLET, EXTENDED RELEASE ORAL at 11:44

## 2018-02-08 RX ADMIN — HEPARIN SODIUM 5000 UNIT(S): 5000 INJECTION INTRAVENOUS; SUBCUTANEOUS at 05:59

## 2018-02-08 RX ADMIN — Medication 12.5 MILLIGRAM(S): at 05:59

## 2018-02-08 RX ADMIN — Medication 81 MILLIGRAM(S): at 11:43

## 2018-02-08 RX ADMIN — Medication 100 MILLIGRAM(S): at 05:59

## 2018-02-08 RX ADMIN — Medication 30 MILLIGRAM(S): at 11:43

## 2018-02-08 RX ADMIN — Medication 325 MILLIGRAM(S): at 11:43

## 2018-02-08 RX ADMIN — ARIPIPRAZOLE 5 MILLIGRAM(S): 15 TABLET ORAL at 11:42

## 2018-02-08 RX ADMIN — AMLODIPINE BESYLATE 5 MILLIGRAM(S): 2.5 TABLET ORAL at 05:59

## 2018-02-08 RX ADMIN — Medication 2000 UNIT(S): at 11:43

## 2018-02-08 NOTE — DISCHARGE NOTE ADULT - MEDICATION SUMMARY - MEDICATIONS TO TAKE
I will START or STAY ON the medications listed below when I get home from the hospital:    acetaminophen 325 mg oral tablet  -- 2 tab(s) by mouth every 6 hours, As needed, Mild Pain (1 - 3)  -- Indication: For pain    aspirin 81 mg oral tablet, chewable  -- 1 tab(s) by mouth once a day  -- Indication: For heart    traMADol 50 mg oral tablet  -- 1 tab(s) by mouth 4 times a day, As needed, Moderate Pain (4 - 6)  -- Indication: For pain    heparin  -- 5000 unit(s) subcutaneous every 8 hours  -- Indication: For DVT prophylaxis    desvenlafaxine 100 mg oral tablet, extended release  -- 1 tab(s) by mouth once a day  -- Indication: For depression    mirtazapine 45 mg oral tablet  -- 1 tab(s) by mouth once a day (at bedtime)  -- Indication: For depression    nortriptyline 50 mg oral capsule  -- 1 cap(s) by mouth once a day (at bedtime)  -- Indication: For depression    atorvastatin 10 mg oral tablet  -- 1 tab(s) by mouth once a day (at bedtime)  -- Indication: For cholesterol    ARIPiprazole 5 mg oral tablet  -- 1 tab(s) by mouth once a day  -- Indication: For depression    busPIRone 30 mg oral tablet  -- 1 tab(s) by mouth once a day  -- Indication: For depression    Metoprolol Tartrate 25 mg oral tablet  -- 0.5 tab(s) by mouth 2 times a day  -- Indication: For HTN    amLODIPine 5 mg oral tablet  -- 1 tab(s) by mouth once a day  -- Indication: For HTN    cholecalciferol 1000 intl units oral tablet  -- 1 tab(s) by mouth once a day  -- Indication: For vit d deficiency

## 2018-02-08 NOTE — PROGRESS NOTE ADULT - ATTENDING COMMENTS
Time spent includes interval history, physical examination, progress note, discharge note, discharge summary, medication reconciliation, coordinating discharge medications, discussion with patient and hospital staff, and arranging for follow up.

## 2018-02-08 NOTE — DISCHARGE NOTE ADULT - PATIENT PORTAL LINK FT
You can access the [facility], offered by [facility], by registering with the following website: [email or web address]

## 2018-02-08 NOTE — DISCHARGE NOTE ADULT - HOSPITAL COURSE
Patient admitted to ortho unit. Seen by orthopedics in consultation. Continued on all out patient meds. Underwent Physical therapy. Knee immobilizer placed. Activity improved and transferred to SNF rehab.

## 2018-02-08 NOTE — PROGRESS NOTE ADULT - SUBJECTIVE AND OBJECTIVE BOX
SUBJECTIVE:    CHIEF COMPLAINT:  Patient is a 89y old  Female who presents with a chief complaint of I fell at home- I tripped over a rug and twisted my knee (2018 11:06)      HPI:  HPI : Pt. is a 89 yo F with a hx of depression and anxiety, left hip surgery, HTN, hyperlipidemia, recent left forearm fracture and ORIF repair BIB ambulance after mechanical fall tripping on a bunched up rug on the floor.  Pt. was walking into the bathroom with a splint on her left arm, tripped on the rug and fell into the wall, then to the floor on her left leg.  Pt. denies head hitting floor.  Denies LOC.  Complains of pain in left knee.  Had trouble walking due to the pain.  Takes aspirin 81 mg daily.  No other injury. Found to have tibial plateau fracture and advised non-weight bearing.	    Active Problems  Aortic valve disorder (424.1) (I35.9)  Bilateral edema of lower extremity (782.3) (R60.0)  CAD (coronary artery disease), native coronary artery (414.01) (I25.10)  Closed fracture of shaft of left radius and ulna, initial encounter (813.23)  (S52.202A,S52.302A)  Essential hypertension (401.9) (I10)  Hematochezia not due to hemorrhage from anus (578.1) (K92.1)  History of depression (V11.8) (Z86.59)  Hyperlipidemia (272.4) (E78.5)  Left bundle branch block (LBBB) (426.3) (I44.7)  Visual hallucinations (368.16) (R44.1)    Past Medical History  CAD (coronary artery disease), native coronary artery (414.01) (I25.10)  Essential hypertension (401.9) (I10)  History of deep venous thrombosis (V12.51) (Z86.718)  History of depression (V11.8) (Z86.59)  History of non-ST elevation myocardial infarction (NSTEMI) (412) (I25.2)  Hyperlipidemia (272.4) (E78.5)    Surgical History  History of Cardiac Cath Procedure Summary  History of Total Hip Replacement    Family History  Family history of CVA (cerebrovascular accident due to intracerebral hemorrhage) :  Brother  Family history of  : Mother, Father  Family history of alcoholism (V17.0) (Z81.1) : Brother  Family history of cardiac disorder (V17.49) (Z82.49) : Uncle  Family history of colon cancer (V16.0) (Z80.0) : Uncle  Family history of diabetes mellitus (V18.0) (Z83.3) : Mother  Family history of hyperlipidemia (V18.19) (Z83.49) : Son    Social History    Never a smoker  No alcohol use  No drug use  Occupation (2018 08:16)      Interval HPI and Overnight Events: Pt doing well. No complaints. Eager for rehab    REVIEW OF SYSTEMS:  CONSTITUTIONAL: No weakness, fevers or chills  EYES/ENT: No visual changes;  No vertigo or throat pain   NECK: No pain or stiffness  RESPIRATORY: No cough, wheezing, hemoptysis; No shortness of breath  CARDIOVASCULAR: No chest pain or palpitations  GASTROINTESTINAL: No abdominal or epigastric pain. No nausea, vomiting, or hematemesis; No diarrhea or constipation. No melena or hematochezia.  GENITOURINARY: No dysuria, frequency or hematuria  NEUROLOGICAL: No numbness or weakness  SKIN: No itching, burning, rashes, or lesions   All other review of systems is negative unless indicated above    OBJECTIVE    Vital Signs Last 24 Hrs  T(C): 36.8 (2018 11:14), Max: 36.8 (2018 11:14)  T(F): 98.2 (2018 11:14), Max: 98.2 (2018 11:14)  HR: 79 (2018 11:14) (74 - 92)  BP: 118/56 (2018 11:14) (109/60 - 132/47)  BP(mean): --  RR: 16 (2018 11:14) (16 - 16)  SpO2: 97% (2018 11:14) (92% - 99%)    MEDICATIONS  (STANDING):  amLODIPine   Tablet 5 milliGRAM(s) Oral daily  ARIPiprazole 5 milliGRAM(s) Oral daily  aspirin  chewable 81 milliGRAM(s) Oral daily  atorvastatin 10 milliGRAM(s) Oral at bedtime  busPIRone 30 milliGRAM(s) Oral daily  cholecalciferol 2000 Unit(s) Oral daily  desvenlafaxine  milliGRAM(s) Oral daily  docusate sodium 100 milliGRAM(s) Oral two times a day  ferrous    sulfate 325 milliGRAM(s) Oral three times a day with meals  heparin  Injectable 5000 Unit(s) SubCutaneous every 8 hours  metoprolol     tartrate 12.5 milliGRAM(s) Oral two times a day  mirtazapine 45 milliGRAM(s) Oral at bedtime  nortriptyline 50 milliGRAM(s) Oral at bedtime

## 2018-02-08 NOTE — DISCHARGE NOTE ADULT - CARE PLAN
Principal Discharge DX:	Tibial plateau fracture, left, closed, initial encounter  Goal:	ambulation and strength training  Assessment and plan of treatment:	Transfer to rehab  NWB left wrist  FWB left leg with knee immobilizer  Continue SQ Heparin  Continue preadmission meds  See medication reconciliation for meds  Follow up with orthopedist  Follow up with Dr. Palmer after discharge from rehab  Secondary Diagnosis:	Closed fracture of left wrist with routine healing, subsequent encounter  Secondary Diagnosis:	Essential hypertension  Secondary Diagnosis:	Hyperlipidemia, unspecified hyperlipidemia type  Secondary Diagnosis:	Recurrent major depressive disorder, in partial remission

## 2018-02-08 NOTE — DISCHARGE NOTE ADULT - CARE PROVIDER_API CALL
Luis Felipe Palmer), Family Medicine  [address] [facility]  [address]  [address] [place] [address]  Phone: [phone]  Fax: [phone]    Kathryn Jain), Orthopedics  [address] [place] [address]  Phone: [phone]  Fax: [phone]

## 2018-02-08 NOTE — DISCHARGE NOTE ADULT - PLAN OF CARE
ambulation and strength training Transfer to rehab  NWB left wrist  FWB left leg with knee immobilizer  Continue SQ Heparin  Continue preadmission meds  See medication reconciliation for meds  Follow up with orthopedist  Follow up with Dr. Palmer after discharge from rehab

## 2018-02-08 NOTE — DISCHARGE NOTE ADULT - SECONDARY DIAGNOSIS.
Closed fracture of left wrist with routine healing, subsequent encounter Essential hypertension Hyperlipidemia, unspecified hyperlipidemia type Recurrent major depressive disorder, in partial remission

## 2018-02-08 NOTE — PROGRESS NOTE ADULT - ASSESSMENT
A/P: 89F with possible subacute L nondisplaced tibial plateau fracture and s/p L BBFA Fx ORIF  Analgesia  DVT ppx  WBAT LLE  NWB LUE in splint  PT  DC planning
89 yo F with a hx of depression and anxiety, left hip surgery, HTN, hyperlipidemia, recent left forearm fracture and ORIF repair BIB ambulance after mechanical fall     Assessment:  Tibial Plateau Fx left leg  Recent Wrist Fx s/p ORIF  Multiple Falls Recently  HTN  Severe Depression controlled with Medication  Mild anemia secondary to recent surgery  H/o ASHD and LBBB  Hyperlipidemia    Plan:  Continue on orthopedic unit  Low sodium low chol diet  PT  Now on weight bearing left leg with left knee immobilizer  Splint to left wrist NWB  Social Service Consult  For rehab transfer romorrow  Discussed with Daughter at bedside  Continue outpatient medications  Vitamin D level, Phos, and Mg++ normal  Ortho following      DVT Prophylaxis:  Heparin SQ    Advanced Directives:  Full Code
89 yo F with a hx of depression and anxiety, left hip surgery, HTN, hyperlipidemia, recent left forearm fracture and ORIF repair BIB ambulance after mechanical fall     Assessment:  Tibial Plateau Fx left leg  Recent Wrist Fx s/p ORIF  Multiple Falls Recently  HTN  Severe Depression controlled with Medication  Mild anemia secondary to recent surgery  H/o ASHD and LBBB  Hyperlipidemia    Plan:  Transfer to SNF Rehab  Low sodium low chol diet  PT  Full weight bearing left leg with left knee immobilizer  Splint to left wrist NWB  Social Service arranged for rehab transfer today  Discussed with Daughter at bedside  Continue outpatient medications  Vitamin D level, Phos, and Mg++ normal      DVT Prophylaxis:  Heparin SQ    Advanced Directives:  Full Code
89 yo F with a hx of depression and anxiety, left hip surgery, HTN, hyperlipidemia, recent left forearm fracture and ORIF repair BIB ambulance after mechanical fall     Assessment:  Tibial Plateau Fx left leg  Recent Wrist Fx s/p ORIF  Multiple Falls Recently  HTN  Severe Depression controlled with Medication  Mild anemia secondary to recent surgery  H/o ASHD and LBBB  Hyperlipidemia    Plan:  Continue on orthopedic unit  Low sodium low chol diet  PT  Now on weight bearing left leg with left knee immobilizer  Splint to left wrist NWB  Social Service Consult  For rehab transfer tomorrow ( Thursday)  Discussed with Daughter at bedside  Continue outpatient medications  Vitamin D level, Phos, and Mg++ normal  Ortho following      DVT Prophylaxis:  Heparin SQ    Advanced Directives:  Full Code

## 2018-02-12 ENCOUNTER — APPOINTMENT (OUTPATIENT)
Dept: ORTHOPEDIC SURGERY | Facility: CLINIC | Age: 83
End: 2018-02-12

## 2018-02-17 DIAGNOSIS — Z86.718 PERSONAL HISTORY OF OTHER VENOUS THROMBOSIS AND EMBOLISM: ICD-10-CM

## 2018-02-17 DIAGNOSIS — I10 ESSENTIAL (PRIMARY) HYPERTENSION: ICD-10-CM

## 2018-02-17 DIAGNOSIS — I25.2 OLD MYOCARDIAL INFARCTION: ICD-10-CM

## 2018-02-17 DIAGNOSIS — Z96.642 PRESENCE OF LEFT ARTIFICIAL HIP JOINT: ICD-10-CM

## 2018-02-17 DIAGNOSIS — E78.5 HYPERLIPIDEMIA, UNSPECIFIED: ICD-10-CM

## 2018-02-17 DIAGNOSIS — F41.9 ANXIETY DISORDER, UNSPECIFIED: ICD-10-CM

## 2018-02-17 DIAGNOSIS — S82.145A NONDISPLACED BICONDYLAR FRACTURE OF LEFT TIBIA, INITIAL ENCOUNTER FOR CLOSED FRACTURE: ICD-10-CM

## 2018-02-17 DIAGNOSIS — I44.7 LEFT BUNDLE-BRANCH BLOCK, UNSPECIFIED: ICD-10-CM

## 2018-02-17 DIAGNOSIS — I25.10 ATHEROSCLEROTIC HEART DISEASE OF NATIVE CORONARY ARTERY WITHOUT ANGINA PECTORIS: ICD-10-CM

## 2018-02-17 DIAGNOSIS — E55.9 VITAMIN D DEFICIENCY, UNSPECIFIED: ICD-10-CM
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
## 2018-02-17 DIAGNOSIS — Z79.82 LONG TERM (CURRENT) USE OF ASPIRIN: ICD-10-CM

## 2018-03-16 ENCOUNTER — APPOINTMENT (OUTPATIENT)
Dept: ORTHOPEDIC SURGERY | Facility: CLINIC | Age: 83
End: 2018-03-16
Payer: MEDICARE

## 2018-03-16 PROCEDURE — 99214 OFFICE O/P EST MOD 30 MIN: CPT | Mod: 24

## 2018-03-16 PROCEDURE — 99024 POSTOP FOLLOW-UP VISIT: CPT

## 2018-04-24 ENCOUNTER — APPOINTMENT (OUTPATIENT)
Dept: ORTHOPEDIC SURGERY | Facility: CLINIC | Age: 83
End: 2018-04-24
Payer: MEDICARE

## 2018-04-24 VITALS
SYSTOLIC BLOOD PRESSURE: 126 MMHG | DIASTOLIC BLOOD PRESSURE: 67 MMHG | WEIGHT: 123 LBS | HEART RATE: 62 BPM | HEIGHT: 62 IN | BODY MASS INDEX: 22.63 KG/M2

## 2018-04-24 DIAGNOSIS — M17.12 UNILATERAL PRIMARY OSTEOARTHRITIS, LEFT KNEE: ICD-10-CM

## 2018-04-24 DIAGNOSIS — M25.562 PAIN IN LEFT KNEE: ICD-10-CM

## 2018-04-24 PROCEDURE — 99214 OFFICE O/P EST MOD 30 MIN: CPT | Mod: 24

## 2018-04-24 PROCEDURE — 99024 POSTOP FOLLOW-UP VISIT: CPT

## 2018-04-25 ENCOUNTER — APPOINTMENT (OUTPATIENT)
Dept: FAMILY MEDICINE | Facility: CLINIC | Age: 83
End: 2018-04-25
Payer: MEDICARE

## 2018-04-25 VITALS
BODY MASS INDEX: 22.63 KG/M2 | SYSTOLIC BLOOD PRESSURE: 102 MMHG | HEIGHT: 62 IN | DIASTOLIC BLOOD PRESSURE: 60 MMHG | WEIGHT: 123 LBS

## 2018-04-25 DIAGNOSIS — Z01.810 ENCOUNTER FOR PREPROCEDURAL CARDIOVASCULAR EXAMINATION: ICD-10-CM

## 2018-04-25 DIAGNOSIS — S52.302A UNSPECIFIED FRACTURE OF SHAFT OF LEFT ULNA, INITIAL ENCOUNTER FOR CLOSED FRACTURE: ICD-10-CM

## 2018-04-25 DIAGNOSIS — S52.202A UNSPECIFIED FRACTURE OF SHAFT OF LEFT ULNA, INITIAL ENCOUNTER FOR CLOSED FRACTURE: ICD-10-CM

## 2018-04-25 PROCEDURE — 99495 TRANSJ CARE MGMT MOD F2F 14D: CPT

## 2018-05-02 ENCOUNTER — FORM ENCOUNTER (OUTPATIENT)
Age: 83
End: 2018-05-02

## 2018-05-03 ENCOUNTER — APPOINTMENT (OUTPATIENT)
Dept: RADIOLOGY | Facility: CLINIC | Age: 83
End: 2018-05-03
Payer: MEDICARE

## 2018-05-03 ENCOUNTER — OUTPATIENT (OUTPATIENT)
Dept: OUTPATIENT SERVICES | Facility: HOSPITAL | Age: 83
LOS: 1 days | End: 2018-05-03
Payer: MEDICARE

## 2018-05-03 DIAGNOSIS — Z00.8 ENCOUNTER FOR OTHER GENERAL EXAMINATION: ICD-10-CM

## 2018-05-03 PROCEDURE — 77080 DXA BONE DENSITY AXIAL: CPT | Mod: 26

## 2018-05-03 PROCEDURE — 77080 DXA BONE DENSITY AXIAL: CPT

## 2018-05-07 ENCOUNTER — RX RENEWAL (OUTPATIENT)
Age: 83
End: 2018-05-07

## 2018-05-09 ENCOUNTER — RX RENEWAL (OUTPATIENT)
Age: 83
End: 2018-05-09

## 2018-05-09 ENCOUNTER — APPOINTMENT (OUTPATIENT)
Dept: FAMILY MEDICINE | Facility: CLINIC | Age: 83
End: 2018-05-09
Payer: MEDICARE

## 2018-05-09 VITALS
BODY MASS INDEX: 22.63 KG/M2 | SYSTOLIC BLOOD PRESSURE: 126 MMHG | HEIGHT: 62 IN | DIASTOLIC BLOOD PRESSURE: 74 MMHG | WEIGHT: 123 LBS

## 2018-05-09 PROCEDURE — 99214 OFFICE O/P EST MOD 30 MIN: CPT

## 2018-05-25 ENCOUNTER — RX RENEWAL (OUTPATIENT)
Age: 83
End: 2018-05-25

## 2018-06-01 ENCOUNTER — RX RENEWAL (OUTPATIENT)
Age: 83
End: 2018-06-01

## 2018-06-22 ENCOUNTER — RX RENEWAL (OUTPATIENT)
Age: 83
End: 2018-06-22

## 2018-07-23 ENCOUNTER — RX RENEWAL (OUTPATIENT)
Age: 83
End: 2018-07-23

## 2018-07-27 PROBLEM — Z80.0 FAMILY HISTORY OF COLON CANCER: Status: ACTIVE | Noted: 2017-11-10

## 2018-07-28 ENCOUNTER — RX RENEWAL (OUTPATIENT)
Age: 83
End: 2018-07-28

## 2018-08-03 ENCOUNTER — APPOINTMENT (OUTPATIENT)
Dept: FAMILY MEDICINE | Facility: CLINIC | Age: 83
End: 2018-08-03
Payer: MEDICARE

## 2018-08-03 VITALS
WEIGHT: 123 LBS | SYSTOLIC BLOOD PRESSURE: 100 MMHG | HEIGHT: 62 IN | BODY MASS INDEX: 22.63 KG/M2 | DIASTOLIC BLOOD PRESSURE: 60 MMHG

## 2018-08-03 PROCEDURE — 99213 OFFICE O/P EST LOW 20 MIN: CPT

## 2018-08-03 NOTE — PLAN
[FreeTextEntry1] : Encouraged push fluids and observation. If symptoms do not resolve, will refer to ENT for direct visualization.\par F/u immediately if worse

## 2018-08-03 NOTE — HISTORY OF PRESENT ILLNESS
[FreeTextEntry8] : Pt. took a pill last night, claims she can still feel it "stuck"  in her throat. No difficulty eating or drinking. Has tried to drink but still has sensation there.

## 2018-08-03 NOTE — ASSESSMENT
[FreeTextEntry1] : Doubt foreign body at this time. May have slight irritation after choking on pill.

## 2018-08-03 NOTE — PHYSICAL EXAM
[Normal] : normal outer ears/nose, normal oropharynx [Ulcer] : no ulcer [Mucositis] : no mucositis  [Thrush] : no thrush [de-identified] : scar tissue to post pharynx from tonsillectomy. No visible FB

## 2018-08-27 ENCOUNTER — RX RENEWAL (OUTPATIENT)
Age: 83
End: 2018-08-27

## 2018-08-29 ENCOUNTER — RX RENEWAL (OUTPATIENT)
Age: 83
End: 2018-08-29

## 2018-09-07 ENCOUNTER — APPOINTMENT (OUTPATIENT)
Dept: FAMILY MEDICINE | Facility: CLINIC | Age: 83
End: 2018-09-07
Payer: MEDICARE

## 2018-09-07 VITALS
DIASTOLIC BLOOD PRESSURE: 60 MMHG | SYSTOLIC BLOOD PRESSURE: 92 MMHG | BODY MASS INDEX: 22.63 KG/M2 | HEIGHT: 62 IN | WEIGHT: 123 LBS

## 2018-09-07 PROCEDURE — 99213 OFFICE O/P EST LOW 20 MIN: CPT

## 2018-09-07 RX ORDER — TRAMADOL HYDROCHLORIDE 50 MG/1
50 TABLET, COATED ORAL
Qty: 28 | Refills: 0 | Status: DISCONTINUED | COMMUNITY
Start: 2018-01-31 | End: 2018-09-07

## 2018-09-07 NOTE — PHYSICAL EXAM
[Normal] : normoactive bowel sounds, soft and nontender, no hepatosplenomegaly or masses appreciated [de-identified] : affect normal. Not overtly depressed today

## 2018-09-17 ENCOUNTER — APPOINTMENT (OUTPATIENT)
Dept: FAMILY MEDICINE | Facility: CLINIC | Age: 83
End: 2018-09-17

## 2018-09-25 ENCOUNTER — RX RENEWAL (OUTPATIENT)
Age: 83
End: 2018-09-25

## 2018-10-01 ENCOUNTER — APPOINTMENT (OUTPATIENT)
Dept: FAMILY MEDICINE | Facility: CLINIC | Age: 83
End: 2018-10-01
Payer: MEDICARE

## 2018-10-01 VITALS
DIASTOLIC BLOOD PRESSURE: 60 MMHG | HEIGHT: 62 IN | SYSTOLIC BLOOD PRESSURE: 110 MMHG | WEIGHT: 123 LBS | BODY MASS INDEX: 22.63 KG/M2

## 2018-10-01 DIAGNOSIS — Z87.09 PERSONAL HISTORY OF OTHER DISEASES OF THE RESPIRATORY SYSTEM: ICD-10-CM

## 2018-10-01 PROCEDURE — 90662 IIV NO PRSV INCREASED AG IM: CPT

## 2018-10-01 PROCEDURE — G0008: CPT

## 2018-10-01 PROCEDURE — 99214 OFFICE O/P EST MOD 30 MIN: CPT | Mod: 25

## 2018-10-01 PROCEDURE — 36415 COLL VENOUS BLD VENIPUNCTURE: CPT

## 2018-10-01 NOTE — HEALTH RISK ASSESSMENT
[Two or more falls in past year] : Patient reported two or more falls in the past year [0] : 2) Feeling down, depressed, or hopeless: Not at all (0) [] : No [[id number]] : 0

## 2018-10-22 ENCOUNTER — RX RENEWAL (OUTPATIENT)
Age: 83
End: 2018-10-22

## 2018-11-01 ENCOUNTER — OUTPATIENT (OUTPATIENT)
Dept: OUTPATIENT SERVICES | Facility: HOSPITAL | Age: 83
LOS: 1 days | End: 2018-11-01
Payer: MEDICARE

## 2018-11-01 PROCEDURE — G9001: CPT

## 2018-11-13 ENCOUNTER — RX RENEWAL (OUTPATIENT)
Age: 83
End: 2018-11-13

## 2018-11-17 ENCOUNTER — EMERGENCY (EMERGENCY)
Facility: HOSPITAL | Age: 83
LOS: 0 days | Discharge: HOME | End: 2018-11-18
Attending: EMERGENCY MEDICINE | Admitting: EMERGENCY MEDICINE
Payer: MEDICARE

## 2018-11-17 VITALS — HEIGHT: 60 IN | WEIGHT: 115.08 LBS

## 2018-11-17 DIAGNOSIS — R05 COUGH: ICD-10-CM

## 2018-11-17 DIAGNOSIS — R50.9 FEVER, UNSPECIFIED: ICD-10-CM

## 2018-11-17 DIAGNOSIS — F32.9 MAJOR DEPRESSIVE DISORDER, SINGLE EPISODE, UNSPECIFIED: ICD-10-CM

## 2018-11-17 DIAGNOSIS — Z79.82 LONG TERM (CURRENT) USE OF ASPIRIN: ICD-10-CM

## 2018-11-17 DIAGNOSIS — Z88.5 ALLERGY STATUS TO NARCOTIC AGENT: ICD-10-CM

## 2018-11-17 DIAGNOSIS — R06.02 SHORTNESS OF BREATH: ICD-10-CM

## 2018-11-17 DIAGNOSIS — R94.31 ABNORMAL ELECTROCARDIOGRAM [ECG] [EKG]: ICD-10-CM

## 2018-11-17 DIAGNOSIS — I10 ESSENTIAL (PRIMARY) HYPERTENSION: ICD-10-CM

## 2018-11-17 LAB
ALBUMIN SERPL ELPH-MCNC: 3.9 G/DL — SIGNIFICANT CHANGE UP (ref 3.3–5)
ALP SERPL-CCNC: 82 U/L — SIGNIFICANT CHANGE UP (ref 40–120)
ALT FLD-CCNC: 21 U/L — SIGNIFICANT CHANGE UP (ref 12–78)
ANION GAP SERPL CALC-SCNC: 8 MMOL/L — SIGNIFICANT CHANGE UP (ref 5–17)
AST SERPL-CCNC: 15 U/L — SIGNIFICANT CHANGE UP (ref 15–37)
BASOPHILS # BLD AUTO: 0.08 K/UL — SIGNIFICANT CHANGE UP (ref 0–0.2)
BASOPHILS NFR BLD AUTO: 0.7 % — SIGNIFICANT CHANGE UP (ref 0–2)
BILIRUB SERPL-MCNC: 0.7 MG/DL — SIGNIFICANT CHANGE UP (ref 0.2–1.2)
BUN SERPL-MCNC: 15 MG/DL — SIGNIFICANT CHANGE UP (ref 7–23)
CALCIUM SERPL-MCNC: 8.9 MG/DL — SIGNIFICANT CHANGE UP (ref 8.5–10.1)
CHLORIDE SERPL-SCNC: 107 MMOL/L — SIGNIFICANT CHANGE UP (ref 96–108)
CO2 SERPL-SCNC: 25 MMOL/L — SIGNIFICANT CHANGE UP (ref 22–31)
CREAT SERPL-MCNC: 1.21 MG/DL — SIGNIFICANT CHANGE UP (ref 0.5–1.3)
EOSINOPHIL # BLD AUTO: 0.08 K/UL — SIGNIFICANT CHANGE UP (ref 0–0.5)
EOSINOPHIL NFR BLD AUTO: 0.7 % — SIGNIFICANT CHANGE UP (ref 0–6)
GLUCOSE SERPL-MCNC: 101 MG/DL — HIGH (ref 70–99)
HCT VFR BLD CALC: 42.2 % — SIGNIFICANT CHANGE UP (ref 34.5–45)
HGB BLD-MCNC: 13.8 G/DL — SIGNIFICANT CHANGE UP (ref 11.5–15.5)
IMM GRANULOCYTES NFR BLD AUTO: 0.3 % — SIGNIFICANT CHANGE UP (ref 0–1.5)
LACTATE SERPL-SCNC: 1.4 MMOL/L — SIGNIFICANT CHANGE UP (ref 0.7–2)
LYMPHOCYTES # BLD AUTO: 1.62 K/UL — SIGNIFICANT CHANGE UP (ref 1–3.3)
LYMPHOCYTES # BLD AUTO: 13.7 % — SIGNIFICANT CHANGE UP (ref 13–44)
MCHC RBC-ENTMCNC: 30.7 PG — SIGNIFICANT CHANGE UP (ref 27–34)
MCHC RBC-ENTMCNC: 32.7 GM/DL — SIGNIFICANT CHANGE UP (ref 32–36)
MCV RBC AUTO: 93.8 FL — SIGNIFICANT CHANGE UP (ref 80–100)
MONOCYTES # BLD AUTO: 1.11 K/UL — HIGH (ref 0–0.9)
MONOCYTES NFR BLD AUTO: 9.4 % — SIGNIFICANT CHANGE UP (ref 2–14)
NEUTROPHILS # BLD AUTO: 8.88 K/UL — HIGH (ref 1.8–7.4)
NEUTROPHILS NFR BLD AUTO: 75.2 % — SIGNIFICANT CHANGE UP (ref 43–77)
NRBC # BLD: 0 /100 WBCS — SIGNIFICANT CHANGE UP (ref 0–0)
PLATELET # BLD AUTO: 232 K/UL — SIGNIFICANT CHANGE UP (ref 150–400)
POTASSIUM SERPL-MCNC: 4.3 MMOL/L — SIGNIFICANT CHANGE UP (ref 3.5–5.3)
POTASSIUM SERPL-SCNC: 4.3 MMOL/L — SIGNIFICANT CHANGE UP (ref 3.5–5.3)
PROT SERPL-MCNC: 7.8 GM/DL — SIGNIFICANT CHANGE UP (ref 6–8.3)
RAPID RVP RESULT: SIGNIFICANT CHANGE UP
RBC # BLD: 4.5 M/UL — SIGNIFICANT CHANGE UP (ref 3.8–5.2)
RBC # FLD: 13.2 % — SIGNIFICANT CHANGE UP (ref 10.3–14.5)
SODIUM SERPL-SCNC: 140 MMOL/L — SIGNIFICANT CHANGE UP (ref 135–145)
WBC # BLD: 11.8 K/UL — HIGH (ref 3.8–10.5)
WBC # FLD AUTO: 11.8 K/UL — HIGH (ref 3.8–10.5)

## 2018-11-17 PROCEDURE — 71045 X-RAY EXAM CHEST 1 VIEW: CPT | Mod: 26

## 2018-11-17 PROCEDURE — 93010 ELECTROCARDIOGRAM REPORT: CPT

## 2018-11-17 PROCEDURE — 99285 EMERGENCY DEPT VISIT HI MDM: CPT

## 2018-11-17 RX ORDER — IPRATROPIUM/ALBUTEROL SULFATE 18-103MCG
3 AEROSOL WITH ADAPTER (GRAM) INHALATION ONCE
Qty: 0 | Refills: 0 | Status: COMPLETED | OUTPATIENT
Start: 2018-11-17 | End: 2018-11-17

## 2018-11-17 RX ORDER — ACETAMINOPHEN 500 MG
650 TABLET ORAL ONCE
Qty: 0 | Refills: 0 | Status: DISCONTINUED | OUTPATIENT
Start: 2018-11-17 | End: 2018-11-17

## 2018-11-17 RX ORDER — PIPERACILLIN AND TAZOBACTAM 4; .5 G/20ML; G/20ML
3.38 INJECTION, POWDER, LYOPHILIZED, FOR SOLUTION INTRAVENOUS ONCE
Qty: 0 | Refills: 0 | Status: COMPLETED | OUTPATIENT
Start: 2018-11-17 | End: 2018-11-17

## 2018-11-17 RX ORDER — SODIUM CHLORIDE 9 MG/ML
600 INJECTION INTRAMUSCULAR; INTRAVENOUS; SUBCUTANEOUS ONCE
Qty: 0 | Refills: 0 | Status: COMPLETED | OUTPATIENT
Start: 2018-11-17 | End: 2018-11-17

## 2018-11-17 RX ORDER — SODIUM CHLORIDE 9 MG/ML
1000 INJECTION INTRAMUSCULAR; INTRAVENOUS; SUBCUTANEOUS ONCE
Qty: 0 | Refills: 0 | Status: COMPLETED | OUTPATIENT
Start: 2018-11-17 | End: 2018-11-17

## 2018-11-17 RX ORDER — ACETAMINOPHEN 500 MG
1000 TABLET ORAL ONCE
Qty: 0 | Refills: 0 | Status: COMPLETED | OUTPATIENT
Start: 2018-11-17 | End: 2018-11-17

## 2018-11-17 RX ADMIN — SODIUM CHLORIDE 1200 MILLILITER(S): 9 INJECTION INTRAMUSCULAR; INTRAVENOUS; SUBCUTANEOUS at 20:40

## 2018-11-17 RX ADMIN — SODIUM CHLORIDE 1000 MILLILITER(S): 9 INJECTION INTRAMUSCULAR; INTRAVENOUS; SUBCUTANEOUS at 20:40

## 2018-11-17 RX ADMIN — Medication 3 MILLILITER(S): at 20:30

## 2018-11-17 RX ADMIN — PIPERACILLIN AND TAZOBACTAM 200 GRAM(S): 4; .5 INJECTION, POWDER, LYOPHILIZED, FOR SOLUTION INTRAVENOUS at 21:22

## 2018-11-17 RX ADMIN — Medication 400 MILLIGRAM(S): at 21:39

## 2018-11-17 RX ADMIN — SODIUM CHLORIDE 600 MILLILITER(S): 9 INJECTION INTRAMUSCULAR; INTRAVENOUS; SUBCUTANEOUS at 21:40

## 2018-11-17 RX ADMIN — Medication 1000 MILLIGRAM(S): at 21:55

## 2018-11-17 RX ADMIN — SODIUM CHLORIDE 1000 MILLILITER(S): 9 INJECTION INTRAMUSCULAR; INTRAVENOUS; SUBCUTANEOUS at 21:40

## 2018-11-17 RX ADMIN — PIPERACILLIN AND TAZOBACTAM 3.38 GRAM(S): 4; .5 INJECTION, POWDER, LYOPHILIZED, FOR SOLUTION INTRAVENOUS at 21:50

## 2018-11-17 NOTE — ED PROVIDER NOTE - OBJECTIVE STATEMENT
89 y/o female with a PMHx of depression, HTN, leaky heart valve presents to the ED c/o SOB. Pt states she took her medication at 1800 last night when one of the pills became stuck in her throat and she began to gag. Pt spit up a lot of white phlegm and eventually coughed up the pill. Pt reports her breathing impr after coughing up the pill. Family at bedside reports pt's breathing appears to be worsening again today. This morning, pt reports difficulty speaking and sore throat. No other acute complaints at time of eval. Allergy to codeine. PCP: Dr. Palmer. 89 y/o female with a PMHx of depression, HTN, leaky heart valve presents to the ED c/o SOB. Pt states she took her medication at 1800 last night when one of the pills became stuck in her throat and she began to gag. Pt spit up a lot of white phlegm and eventually coughed up the pill. Pt reports her breathing impr after coughing up the pill. Family at bedside reports pt's breathing appears to be worsening again today. This morning, pt reports difficulty speaking and sore throat. Pt with fever in ED. No other acute complaints at time of eval. Allergy to codeine. PCP: Dr. Palmer.

## 2018-11-17 NOTE — ED STATDOCS - PROGRESS NOTE DETAILS
Pippa Mccarty for attending Dr. Bland: 89 y/o female with no significant PMHx presents to the ED c/o SOB starting earlier today. +sore throat, +SOB. Denies fevers, chills, CP, leg swelling. Pt notes she took a pill, it got stuck in her throat, and eventually came out. PCP: Dr. Palmer. No other complaints at this time. Will send pt to main ED for further evaluation.

## 2018-11-17 NOTE — ED PROVIDER NOTE - PROGRESS NOTE DETAILS
Dr. Hudson:  Pt c/o increased SOB, noted to have increased work of breathing walking in ED, P/O. 94% before & after ambulation trial.  Paging Dr. Palmer.  Chuy ordered. Dr. Hudson:  Case d/w Dr. Palmer.  He advises D/C on po Augmentin, office f/u Monday, 11/19. Dr. Hudson:  Pt c/o increased SOB, noted to have some increased work of breathing walking in ED, P/O. 94% before & after ambulation trial, no desaturation.  Paging Dr. Palmer.  Chuy ordered. Dr. Hudson:  Case d/w Dr. Palmer.  He advises D/C on po Augmentin, office f/u Monday, 11/19, return to ED if symptomatic worsening before then.  Pt & daughter agreeable w/ plan.

## 2018-11-17 NOTE — ED PROVIDER NOTE - CONSTITUTIONAL, MLM
normal... Elderly white female, alert, mild respiratory discomfort, ill appearing but nontoxic. No sentence shortening.

## 2018-11-17 NOTE — ED ADULT NURSE NOTE - NSIMPLEMENTINTERV_GEN_ALL_ED
Implemented All Fall with Harm Risk Interventions:  [place] to call system. Call bell, personal items and telephone within reach. Instruct patient to call for assistance. Room bathroom lighting operational. Non-slip footwear when patient is off stretcher. Physically safe environment: no spills, clutter or unnecessary equipment. Stretcher in lowest position, wheels locked, appropriate side rails in place. Provide visual cue, wrist band, yellow gown, etc. Monitor gait and stability. Monitor for mental status changes and reorient to person, place, and time. Review medications for side effects contributing to fall risk. Reinforce activity limits and safety measures with patient and family. Provide visual clues: red socks.

## 2018-11-17 NOTE — ED STATDOCS - OBJECTIVE STATEMENT
89 y/o female with no significant PMHx presents to the ED c/o SOB starting earlier today. +sore throat, +SOB. Denies fevers, chills, CP, leg swelling. Pt notes she took a pill, it got stuck in her throat, and eventually came out. PCP: Dr. Palmer. No other complaints at this time.

## 2018-11-17 NOTE — ED ADULT NURSE REASSESSMENT NOTE - NS ED NURSE REASSESS COMMENT FT1
dysphagia screening passed, but pt reports difficulty swallowing pills. MD Contino aware, IV Ofirmev to be ordered

## 2018-11-17 NOTE — ED PROVIDER NOTE - MEDICAL DECISION MAKING DETAILS
89 y/o F choked on a pill yesterday, daughter noted today she is having trouble breathing. Limited exertion/activity due to SOB, generalized weakness. Found to be febrile in ED. Sepsis alert, IVF, zosyn for ? aspiration pneumonia, labs, CXR, monitor, observe, and reassess.

## 2018-11-17 NOTE — ED ADULT NURSE NOTE - OBJECTIVE STATEMENT
pt presents to ED c/o one medication getting stuck in her throat last night around 6pm. Pt's daughter states that she has pt presents to ED c/o one medication getting stuck in her throat last night around 6pm. Pt's daughter states that she has had increased weakness, difficulty breathing, and decreased PO intake as of today. Pt denies HA, abd pain, chest pain, and n/v. pt AOx4, breathing symmetrical and labored, MD Contino at bedside.

## 2018-11-17 NOTE — ED PROVIDER NOTE - ENMT, MLM
Airway patent, Nasal mucosa clear. Oropharynx clear, mucous membranes moist. Throat has no vesicles, no oropharyngeal exudates and uvula is midline.

## 2018-11-18 VITALS
RESPIRATION RATE: 20 BRPM | SYSTOLIC BLOOD PRESSURE: 112 MMHG | DIASTOLIC BLOOD PRESSURE: 60 MMHG | OXYGEN SATURATION: 95 % | HEART RATE: 110 BPM

## 2018-11-18 RX ORDER — IPRATROPIUM/ALBUTEROL SULFATE 18-103MCG
3 AEROSOL WITH ADAPTER (GRAM) INHALATION ONCE
Qty: 0 | Refills: 0 | Status: COMPLETED | OUTPATIENT
Start: 2018-11-18 | End: 2018-11-18

## 2018-11-18 RX ADMIN — Medication 3 MILLILITER(S): at 00:03

## 2018-11-19 ENCOUNTER — APPOINTMENT (OUTPATIENT)
Dept: FAMILY MEDICINE | Facility: CLINIC | Age: 83
End: 2018-11-19
Payer: MEDICARE

## 2018-11-19 VITALS
DIASTOLIC BLOOD PRESSURE: 60 MMHG | WEIGHT: 123 LBS | BODY MASS INDEX: 22.63 KG/M2 | SYSTOLIC BLOOD PRESSURE: 110 MMHG | HEIGHT: 62 IN

## 2018-11-19 PROBLEM — I10 ESSENTIAL (PRIMARY) HYPERTENSION: Chronic | Status: ACTIVE | Noted: 2018-11-17

## 2018-11-19 PROBLEM — F32.9 MAJOR DEPRESSIVE DISORDER, SINGLE EPISODE, UNSPECIFIED: Chronic | Status: ACTIVE | Noted: 2018-11-17

## 2018-11-19 PROCEDURE — 99213 OFFICE O/P EST LOW 20 MIN: CPT

## 2018-11-19 NOTE — HISTORY OF PRESENT ILLNESS
[Admitted on: ___] : The patient was admitted on [unfilled] [Discharged on ___] : discharged on [unfilled] [FreeTextEntry2] : Pt. went to  ED due to shortness of breath Had choked on a pill and had a severe coughing fit. In ED, Pt. had bwk, WBC high. THought it was possible a very early aspiration. O2 sat normal. Low grade feer. Pt. claims she had a lot of mucus yesterday. Pt. was sent home on Amoxicillin. Feeling better today. No more fever. Cough improved. Still with mild laryngitis from the coughing fit.

## 2018-11-19 NOTE — PLAN
[FreeTextEntry1] : Continue Augment\par Push fluids\par Vaporizer\par Gargle with warm salt water\par Chloraseptic spray as needed for sore throat\par Drink warm liquids\par F/u if not improved

## 2018-11-23 ENCOUNTER — EMERGENCY (EMERGENCY)
Facility: HOSPITAL | Age: 83
LOS: 0 days | Discharge: ANOTHER HOSPITAL | End: 2018-11-24
Attending: EMERGENCY MEDICINE | Admitting: EMERGENCY MEDICINE
Payer: MEDICARE

## 2018-11-23 VITALS
HEIGHT: 61 IN | WEIGHT: 115.08 LBS | HEART RATE: 128 BPM | RESPIRATION RATE: 18 BRPM | OXYGEN SATURATION: 100 % | DIASTOLIC BLOOD PRESSURE: 62 MMHG | SYSTOLIC BLOOD PRESSURE: 141 MMHG

## 2018-11-23 DIAGNOSIS — Z88.5 ALLERGY STATUS TO NARCOTIC AGENT: ICD-10-CM

## 2018-11-23 DIAGNOSIS — R50.9 FEVER, UNSPECIFIED: ICD-10-CM

## 2018-11-23 DIAGNOSIS — I10 ESSENTIAL (PRIMARY) HYPERTENSION: ICD-10-CM

## 2018-11-23 DIAGNOSIS — K85.90 ACUTE PANCREATITIS WITHOUT NECROSIS OR INFECTION, UNSPECIFIED: ICD-10-CM

## 2018-11-23 DIAGNOSIS — Z71.89 OTHER SPECIFIED COUNSELING: ICD-10-CM

## 2018-11-23 DIAGNOSIS — F32.9 MAJOR DEPRESSIVE DISORDER, SINGLE EPISODE, UNSPECIFIED: ICD-10-CM

## 2018-11-23 DIAGNOSIS — K83.09 OTHER CHOLANGITIS: ICD-10-CM

## 2018-11-23 DIAGNOSIS — Z79.82 LONG TERM (CURRENT) USE OF ASPIRIN: ICD-10-CM

## 2018-11-23 DIAGNOSIS — R06.02 SHORTNESS OF BREATH: ICD-10-CM

## 2018-11-23 LAB
APTT BLD: 22.9 SEC — LOW (ref 27.5–36.3)
CULTURE RESULTS: SIGNIFICANT CHANGE UP
CULTURE RESULTS: SIGNIFICANT CHANGE UP
HCT VFR BLD CALC: 41 % — SIGNIFICANT CHANGE UP (ref 34.5–45)
HGB BLD-MCNC: 13.5 G/DL — SIGNIFICANT CHANGE UP (ref 11.5–15.5)
INR BLD: 1.17 RATIO — HIGH (ref 0.88–1.16)
LACTATE SERPL-SCNC: 4.6 MMOL/L — CRITICAL HIGH (ref 0.7–2)
MCHC RBC-ENTMCNC: 30.6 PG — SIGNIFICANT CHANGE UP (ref 27–34)
MCHC RBC-ENTMCNC: 32.9 GM/DL — SIGNIFICANT CHANGE UP (ref 32–36)
MCV RBC AUTO: 93 FL — SIGNIFICANT CHANGE UP (ref 80–100)
PLATELET # BLD AUTO: 192 K/UL — SIGNIFICANT CHANGE UP (ref 150–400)
PROTHROM AB SERPL-ACNC: 13.1 SEC — HIGH (ref 10–12.9)
RBC # BLD: 4.41 M/UL — SIGNIFICANT CHANGE UP (ref 3.8–5.2)
RBC # FLD: 12.9 % — SIGNIFICANT CHANGE UP (ref 10.3–14.5)
SPECIMEN SOURCE: SIGNIFICANT CHANGE UP
SPECIMEN SOURCE: SIGNIFICANT CHANGE UP
WBC # BLD: 3.41 K/UL — LOW (ref 3.8–10.5)
WBC # FLD AUTO: 3.41 K/UL — LOW (ref 3.8–10.5)

## 2018-11-23 PROCEDURE — 93010 ELECTROCARDIOGRAM REPORT: CPT

## 2018-11-23 PROCEDURE — 71045 X-RAY EXAM CHEST 1 VIEW: CPT | Mod: 26

## 2018-11-23 RX ORDER — SODIUM CHLORIDE 9 MG/ML
1600 INJECTION INTRAMUSCULAR; INTRAVENOUS; SUBCUTANEOUS ONCE
Qty: 0 | Refills: 0 | Status: COMPLETED | OUTPATIENT
Start: 2018-11-23 | End: 2018-11-23

## 2018-11-23 RX ORDER — ACETAMINOPHEN 500 MG
650 TABLET ORAL ONCE
Qty: 0 | Refills: 0 | Status: COMPLETED | OUTPATIENT
Start: 2018-11-23 | End: 2018-11-23

## 2018-11-23 RX ORDER — PIPERACILLIN AND TAZOBACTAM 4; .5 G/20ML; G/20ML
3.38 INJECTION, POWDER, LYOPHILIZED, FOR SOLUTION INTRAVENOUS ONCE
Qty: 0 | Refills: 0 | Status: COMPLETED | OUTPATIENT
Start: 2018-11-23 | End: 2018-11-23

## 2018-11-23 RX ADMIN — PIPERACILLIN AND TAZOBACTAM 200 GRAM(S): 4; .5 INJECTION, POWDER, LYOPHILIZED, FOR SOLUTION INTRAVENOUS at 23:40

## 2018-11-23 RX ADMIN — SODIUM CHLORIDE 3200 MILLILITER(S): 9 INJECTION INTRAMUSCULAR; INTRAVENOUS; SUBCUTANEOUS at 23:18

## 2018-11-23 NOTE — ED PROVIDER NOTE - PROGRESS NOTE DETAILS
appears comofrtable will scan abdomen minial epigastric ttp family updated on abdnormal lab values bp droppoing will start levo surgery recs rtx family agrees icu at [facility]

## 2018-11-23 NOTE — ED ADULT TRIAGE NOTE - CHIEF COMPLAINT QUOTE
Pt presents to the ED with complaints of shortness of breath and generalized not feeling well. Pt has been on amoxicillin the past week and states she has not felt any better. Pt states she also feels she has the chills.

## 2018-11-23 NOTE — ED PROVIDER NOTE - OBJECTIVE STATEMENT
pt presents sudden onset this am  chills weakness.  +fever. denies HA or neck pain. no chest pain or sob. no abd pain. no n/v/d. no urinary f/u/d. no back pain. no motor or sensory deficits. denies illicit drug use. no recent travel. no rash. no other acute issues symptoms or concerns

## 2018-11-23 NOTE — ED PROVIDER NOTE - MEDICAL DECISION MAKING DETAILS
hypotensive need for presors surgery stating need interventional GI do not have at our facilty transfer and accceptance to MICU [place] pt full code highly functional . POA if pt decompensates is [place] [phone]

## 2018-11-23 NOTE — ED PROVIDER NOTE - CARE PLAN
Principal Discharge DX:	Fever Principal Discharge DX:	Fever  Secondary Diagnosis:	Pancreatitis Principal Discharge DX:	Fever  Secondary Diagnosis:	Pancreatitis  Secondary Diagnosis:	Ascending cholangitis

## 2018-11-24 ENCOUNTER — INPATIENT (INPATIENT)
Facility: HOSPITAL | Age: 83
LOS: 4 days | Discharge: HOME | DRG: 871 | End: 2018-11-29
Attending: HOSPITALIST | Admitting: STUDENT IN AN ORGANIZED HEALTH CARE EDUCATION/TRAINING PROGRAM
Payer: MEDICARE

## 2018-11-24 VITALS
HEART RATE: 139 BPM | OXYGEN SATURATION: 100 % | DIASTOLIC BLOOD PRESSURE: 56 MMHG | SYSTOLIC BLOOD PRESSURE: 101 MMHG | RESPIRATION RATE: 26 BRPM

## 2018-11-24 VITALS
HEART RATE: 128 BPM | RESPIRATION RATE: 20 BRPM | HEIGHT: 60 IN | OXYGEN SATURATION: 97 % | WEIGHT: 104.94 LBS | TEMPERATURE: 101 F | SYSTOLIC BLOOD PRESSURE: 132 MMHG | DIASTOLIC BLOOD PRESSURE: 64 MMHG

## 2018-11-24 DIAGNOSIS — K85.9 ACUTE PANCREATITIS, UNSPECIFIED: ICD-10-CM

## 2018-11-24 DIAGNOSIS — K85.90 ACUTE PANCREATITIS WITHOUT NECROSIS OR INFECTION, UNSPECIFIED: ICD-10-CM

## 2018-11-24 LAB
-  K. PNEUMONIAE GROUP: SIGNIFICANT CHANGE UP
ALBUMIN SERPL ELPH-MCNC: 3 G/DL — LOW (ref 3.3–5)
ALBUMIN SERPL ELPH-MCNC: 3.4 G/DL — SIGNIFICANT CHANGE UP (ref 3.3–5)
ALP SERPL-CCNC: 194 U/L — HIGH (ref 40–120)
ALP SERPL-CCNC: 298 U/L — HIGH (ref 40–120)
ALT FLD-CCNC: 333 U/L — HIGH (ref 10–45)
ALT FLD-CCNC: 379 U/L — HIGH (ref 12–78)
ANION GAP SERPL CALC-SCNC: 11 MMOL/L — SIGNIFICANT CHANGE UP (ref 5–17)
ANION GAP SERPL CALC-SCNC: 12 MMOL/L — SIGNIFICANT CHANGE UP (ref 5–17)
ANION GAP SERPL CALC-SCNC: 8 MMOL/L — SIGNIFICANT CHANGE UP (ref 5–17)
APPEARANCE UR: CLEAR — SIGNIFICANT CHANGE UP
APTT BLD: 26.3 SEC — LOW (ref 27.5–36.3)
APTT BLD: 29.9 SEC — SIGNIFICANT CHANGE UP (ref 27.5–36.3)
AST SERPL-CCNC: 509 U/L — HIGH (ref 10–40)
AST SERPL-CCNC: 930 U/L — HIGH (ref 15–37)
BACTERIA # UR AUTO: ABNORMAL
BASE EXCESS BLDV CALC-SCNC: -0.1 MMOL/L — SIGNIFICANT CHANGE UP (ref -2–2)
BASOPHILS # BLD AUTO: 0.01 K/UL — SIGNIFICANT CHANGE UP (ref 0–0.2)
BASOPHILS NFR BLD AUTO: 0.3 % — SIGNIFICANT CHANGE UP (ref 0–2)
BILIRUB SERPL-MCNC: 1.4 MG/DL — HIGH (ref 0.2–1.2)
BILIRUB SERPL-MCNC: 1.5 MG/DL — HIGH (ref 0.2–1.2)
BILIRUB UR-MCNC: NEGATIVE — SIGNIFICANT CHANGE UP
BLD GP AB SCN SERPL QL: SIGNIFICANT CHANGE UP
BUN SERPL-MCNC: 10 MG/DL — SIGNIFICANT CHANGE UP (ref 7–23)
BUN SERPL-MCNC: 9 MG/DL — SIGNIFICANT CHANGE UP (ref 7–23)
BUN SERPL-MCNC: 9 MG/DL — SIGNIFICANT CHANGE UP (ref 7–23)
CA-I SERPL-SCNC: 1.12 MMOL/L — SIGNIFICANT CHANGE UP (ref 1.12–1.3)
CALCIUM SERPL-MCNC: 7.8 MG/DL — LOW (ref 8.4–10.5)
CALCIUM SERPL-MCNC: 8.1 MG/DL — LOW (ref 8.4–10.5)
CALCIUM SERPL-MCNC: 8.6 MG/DL — SIGNIFICANT CHANGE UP (ref 8.5–10.1)
CHLORIDE BLDV-SCNC: 105 MMOL/L — SIGNIFICANT CHANGE UP (ref 96–108)
CHLORIDE SERPL-SCNC: 104 MMOL/L — SIGNIFICANT CHANGE UP (ref 96–108)
CHLORIDE SERPL-SCNC: 105 MMOL/L — SIGNIFICANT CHANGE UP (ref 96–108)
CHLORIDE SERPL-SCNC: 108 MMOL/L — SIGNIFICANT CHANGE UP (ref 96–108)
CK MB BLD-MCNC: 6.6 % — HIGH (ref 0–3.5)
CK MB BLD-MCNC: 6.7 % — HIGH (ref 0–3.5)
CK MB CFR SERPL CALC: 4.9 NG/ML — HIGH (ref 0–3.8)
CK MB CFR SERPL CALC: 5.2 NG/ML — HIGH (ref 0–3.8)
CK SERPL-CCNC: 59 U/L — SIGNIFICANT CHANGE UP (ref 26–192)
CK SERPL-CCNC: 73 U/L — SIGNIFICANT CHANGE UP (ref 25–170)
CK SERPL-CCNC: 79 U/L — SIGNIFICANT CHANGE UP (ref 25–170)
CO2 BLDV-SCNC: 26 MMOL/L — SIGNIFICANT CHANGE UP (ref 22–30)
CO2 SERPL-SCNC: 22 MMOL/L — SIGNIFICANT CHANGE UP (ref 22–31)
CO2 SERPL-SCNC: 23 MMOL/L — SIGNIFICANT CHANGE UP (ref 22–31)
CO2 SERPL-SCNC: 25 MMOL/L — SIGNIFICANT CHANGE UP (ref 22–31)
COLOR SPEC: YELLOW — SIGNIFICANT CHANGE UP
CREAT SERPL-MCNC: 1.07 MG/DL — SIGNIFICANT CHANGE UP (ref 0.5–1.3)
CREAT SERPL-MCNC: 1.14 MG/DL — SIGNIFICANT CHANGE UP (ref 0.5–1.3)
CREAT SERPL-MCNC: 1.28 MG/DL — SIGNIFICANT CHANGE UP (ref 0.5–1.3)
DIFF PNL FLD: ABNORMAL
EOSINOPHIL # BLD AUTO: 0.01 K/UL — SIGNIFICANT CHANGE UP (ref 0–0.5)
EOSINOPHIL NFR BLD AUTO: 0.3 % — SIGNIFICANT CHANGE UP (ref 0–6)
EPI CELLS # UR: NEGATIVE — SIGNIFICANT CHANGE UP
GAS PNL BLDV: 135 MMOL/L — LOW (ref 136–145)
GAS PNL BLDV: SIGNIFICANT CHANGE UP
GAS PNL BLDV: SIGNIFICANT CHANGE UP
GLUCOSE BLDV-MCNC: 86 MG/DL — SIGNIFICANT CHANGE UP (ref 70–99)
GLUCOSE SERPL-MCNC: 108 MG/DL — HIGH (ref 70–99)
GLUCOSE SERPL-MCNC: 83 MG/DL — SIGNIFICANT CHANGE UP (ref 70–99)
GLUCOSE SERPL-MCNC: 93 MG/DL — SIGNIFICANT CHANGE UP (ref 70–99)
GLUCOSE UR QL: NEGATIVE MG/DL — SIGNIFICANT CHANGE UP
GRAM STN FLD: SIGNIFICANT CHANGE UP
HCO3 BLDV-SCNC: 24 MMOL/L — SIGNIFICANT CHANGE UP (ref 21–29)
HCT VFR BLD CALC: 32.4 % — LOW (ref 34.5–45)
HCT VFR BLDA CALC: 34 % — LOW (ref 39–50)
HGB BLD CALC-MCNC: 11.1 G/DL — LOW (ref 11.5–15.5)
HGB BLD-MCNC: 11.3 G/DL — LOW (ref 11.5–15.5)
HOROWITZ INDEX BLDV+IHG-RTO: 36 — SIGNIFICANT CHANGE UP
IMM GRANULOCYTES NFR BLD AUTO: 0.3 % — SIGNIFICANT CHANGE UP (ref 0–1.5)
INR BLD: 1.34 RATIO — HIGH (ref 0.88–1.16)
INR BLD: 1.69 RATIO — HIGH (ref 0.88–1.16)
KETONES UR-MCNC: NEGATIVE — SIGNIFICANT CHANGE UP
LACTATE BLDV-MCNC: 2.6 MMOL/L — HIGH (ref 0.7–2)
LACTATE SERPL-SCNC: 2.9 MMOL/L — HIGH (ref 0.7–2)
LEUKOCYTE ESTERASE UR-ACNC: ABNORMAL
LIDOCAIN IGE QN: HIGH U/L (ref 73–393)
LYMPHOCYTES # BLD AUTO: 0.07 K/UL — LOW (ref 1–3.3)
LYMPHOCYTES # BLD AUTO: 2.1 % — LOW (ref 13–44)
MAGNESIUM SERPL-MCNC: 1.5 MG/DL — LOW (ref 1.6–2.6)
MAGNESIUM SERPL-MCNC: 1.6 MG/DL — SIGNIFICANT CHANGE UP (ref 1.6–2.6)
MAGNESIUM SERPL-MCNC: 1.9 MG/DL — SIGNIFICANT CHANGE UP (ref 1.6–2.6)
MANUAL SMEAR VERIFICATION: SIGNIFICANT CHANGE UP
MCHC RBC-ENTMCNC: 31.8 PG — SIGNIFICANT CHANGE UP (ref 27–34)
MCHC RBC-ENTMCNC: 34.9 GM/DL — SIGNIFICANT CHANGE UP (ref 32–36)
MCV RBC AUTO: 91.2 FL — SIGNIFICANT CHANGE UP (ref 80–100)
METHOD TYPE: SIGNIFICANT CHANGE UP
MONOCYTES # BLD AUTO: 0.04 K/UL — SIGNIFICANT CHANGE UP (ref 0–0.9)
MONOCYTES NFR BLD AUTO: 1.2 % — LOW (ref 2–14)
NEUTROPHILS # BLD AUTO: 3.27 K/UL — SIGNIFICANT CHANGE UP (ref 1.8–7.4)
NEUTROPHILS NFR BLD AUTO: 95.8 % — HIGH (ref 43–77)
NITRITE UR-MCNC: NEGATIVE — SIGNIFICANT CHANGE UP
NRBC # BLD: 0 /100 WBCS — SIGNIFICANT CHANGE UP (ref 0–0)
OTHER CELLS CSF MANUAL: 9 ML/DL — LOW (ref 18–22)
PCO2 BLDV: 41 MMHG — SIGNIFICANT CHANGE UP (ref 35–50)
PH BLDV: 7.39 — SIGNIFICANT CHANGE UP (ref 7.35–7.45)
PH UR: 7 — SIGNIFICANT CHANGE UP (ref 5–8)
PHOSPHATE SERPL-MCNC: 2 MG/DL — LOW (ref 2.5–4.5)
PHOSPHATE SERPL-MCNC: 3.5 MG/DL — SIGNIFICANT CHANGE UP (ref 2.5–4.5)
PLAT MORPH BLD: NORMAL — SIGNIFICANT CHANGE UP
PLATELET # BLD AUTO: 167 K/UL — SIGNIFICANT CHANGE UP (ref 150–400)
PO2 BLDV: <50 MMHG — SIGNIFICANT CHANGE UP (ref 25–45)
POTASSIUM BLDV-SCNC: 3.2 MMOL/L — LOW (ref 3.5–5.3)
POTASSIUM SERPL-MCNC: 3.4 MMOL/L — LOW (ref 3.5–5.3)
POTASSIUM SERPL-MCNC: 3.6 MMOL/L — SIGNIFICANT CHANGE UP (ref 3.5–5.3)
POTASSIUM SERPL-MCNC: 4.4 MMOL/L — SIGNIFICANT CHANGE UP (ref 3.5–5.3)
POTASSIUM SERPL-SCNC: 3.4 MMOL/L — LOW (ref 3.5–5.3)
POTASSIUM SERPL-SCNC: 3.6 MMOL/L — SIGNIFICANT CHANGE UP (ref 3.5–5.3)
POTASSIUM SERPL-SCNC: 4.4 MMOL/L — SIGNIFICANT CHANGE UP (ref 3.5–5.3)
PROT SERPL-MCNC: 5.3 G/DL — LOW (ref 6–8.3)
PROT SERPL-MCNC: 6.8 GM/DL — SIGNIFICANT CHANGE UP (ref 6–8.3)
PROT UR-MCNC: 15 MG/DL
PROTHROM AB SERPL-ACNC: 15.4 SEC — HIGH (ref 10–12.9)
PROTHROM AB SERPL-ACNC: 19.7 SEC — HIGH (ref 10–12.9)
RAPID RVP RESULT: SIGNIFICANT CHANGE UP
RBC # BLD: 3.55 M/UL — LOW (ref 3.8–5.2)
RBC # FLD: 11.8 % — SIGNIFICANT CHANGE UP (ref 10.3–14.5)
RBC BLD AUTO: SIGNIFICANT CHANGE UP
RBC CASTS # UR COMP ASSIST: SIGNIFICANT CHANGE UP /HPF (ref 0–4)
SAO2 % BLDV: 60 % — LOW (ref 67–88)
SODIUM SERPL-SCNC: 138 MMOL/L — SIGNIFICANT CHANGE UP (ref 135–145)
SODIUM SERPL-SCNC: 138 MMOL/L — SIGNIFICANT CHANGE UP (ref 135–145)
SODIUM SERPL-SCNC: 142 MMOL/L — SIGNIFICANT CHANGE UP (ref 135–145)
SP GR SPEC: 1 — LOW (ref 1.01–1.02)
SPECIMEN SOURCE: SIGNIFICANT CHANGE UP
SPECIMEN SOURCE: SIGNIFICANT CHANGE UP
TROPONIN I SERPL-MCNC: 0.04 NG/ML — SIGNIFICANT CHANGE UP (ref 0.01–0.04)
TROPONIN I SERPL-MCNC: 0.15 NG/ML — HIGH (ref 0.01–0.04)
TROPONIN T, HIGH SENSITIVITY RESULT: 114 NG/L — HIGH (ref 0–51)
TROPONIN T, HIGH SENSITIVITY RESULT: 136 NG/L — HIGH (ref 0–51)
TROPONIN T, HIGH SENSITIVITY RESULT: 73 NG/L — HIGH (ref 0–51)
TSH SERPL-MCNC: 1.19 UU/ML — SIGNIFICANT CHANGE UP (ref 0.34–4.82)
TYPE + AB SCN PNL BLD: SIGNIFICANT CHANGE UP
UROBILINOGEN FLD QL: NEGATIVE MG/DL — SIGNIFICANT CHANGE UP
WBC # BLD: 14.3 K/UL — HIGH (ref 3.8–10.5)
WBC # FLD AUTO: 14.3 K/UL — HIGH (ref 3.8–10.5)
WBC UR QL: SIGNIFICANT CHANGE UP

## 2018-11-24 PROCEDURE — 99291 CRITICAL CARE FIRST HOUR: CPT

## 2018-11-24 PROCEDURE — 76705 ECHO EXAM OF ABDOMEN: CPT | Mod: 26

## 2018-11-24 PROCEDURE — 74181 MRI ABDOMEN W/O CONTRAST: CPT | Mod: 26

## 2018-11-24 PROCEDURE — 99223 1ST HOSP IP/OBS HIGH 75: CPT | Mod: GC

## 2018-11-24 PROCEDURE — 99292 CRITICAL CARE ADDL 30 MIN: CPT

## 2018-11-24 PROCEDURE — 93010 ELECTROCARDIOGRAM REPORT: CPT

## 2018-11-24 PROCEDURE — 71045 X-RAY EXAM CHEST 1 VIEW: CPT | Mod: 26

## 2018-11-24 PROCEDURE — 99223 1ST HOSP IP/OBS HIGH 75: CPT

## 2018-11-24 PROCEDURE — 74177 CT ABD & PELVIS W/CONTRAST: CPT | Mod: 26

## 2018-11-24 RX ORDER — SODIUM CHLORIDE 9 MG/ML
1000 INJECTION, SOLUTION INTRAVENOUS
Qty: 0 | Refills: 0 | Status: DISCONTINUED | OUTPATIENT
Start: 2018-11-24 | End: 2018-11-26

## 2018-11-24 RX ORDER — MAGNESIUM SULFATE 500 MG/ML
2 VIAL (ML) INJECTION ONCE
Qty: 0 | Refills: 0 | Status: DISCONTINUED | OUTPATIENT
Start: 2018-11-24 | End: 2018-11-24

## 2018-11-24 RX ORDER — PIPERACILLIN AND TAZOBACTAM 4; .5 G/20ML; G/20ML
3.38 INJECTION, POWDER, LYOPHILIZED, FOR SOLUTION INTRAVENOUS EVERY 8 HOURS
Qty: 0 | Refills: 0 | Status: DISCONTINUED | OUTPATIENT
Start: 2018-11-24 | End: 2018-11-26

## 2018-11-24 RX ORDER — NOREPINEPHRINE BITARTRATE/D5W 8 MG/250ML
0.04 PLASTIC BAG, INJECTION (ML) INTRAVENOUS
Qty: 8 | Refills: 0 | Status: DISCONTINUED | OUTPATIENT
Start: 2018-11-24 | End: 2018-11-24

## 2018-11-24 RX ORDER — ACETAMINOPHEN 500 MG
650 TABLET ORAL EVERY 6 HOURS
Qty: 0 | Refills: 0 | Status: DISCONTINUED | OUTPATIENT
Start: 2018-11-24 | End: 2018-11-29

## 2018-11-24 RX ORDER — SODIUM CHLORIDE 9 MG/ML
1000 INJECTION, SOLUTION INTRAVENOUS
Qty: 0 | Refills: 0 | Status: DISCONTINUED | OUTPATIENT
Start: 2018-11-24 | End: 2018-11-24

## 2018-11-24 RX ORDER — NOREPINEPHRINE BITARTRATE/D5W 8 MG/250ML
0.05 PLASTIC BAG, INJECTION (ML) INTRAVENOUS
Qty: 8 | Refills: 0 | Status: DISCONTINUED | OUTPATIENT
Start: 2018-11-24 | End: 2018-11-24

## 2018-11-24 RX ORDER — POTASSIUM CHLORIDE 20 MEQ
40 PACKET (EA) ORAL ONCE
Qty: 0 | Refills: 0 | Status: COMPLETED | OUTPATIENT
Start: 2018-11-24 | End: 2018-11-24

## 2018-11-24 RX ORDER — POTASSIUM PHOSPHATE, MONOBASIC POTASSIUM PHOSPHATE, DIBASIC 236; 224 MG/ML; MG/ML
15 INJECTION, SOLUTION INTRAVENOUS ONCE
Qty: 0 | Refills: 0 | Status: COMPLETED | OUTPATIENT
Start: 2018-11-24 | End: 2018-11-24

## 2018-11-24 RX ORDER — POTASSIUM CHLORIDE 20 MEQ
30 PACKET (EA) ORAL ONCE
Qty: 0 | Refills: 0 | Status: DISCONTINUED | OUTPATIENT
Start: 2018-11-24 | End: 2018-11-24

## 2018-11-24 RX ORDER — ACETAMINOPHEN 500 MG
1000 TABLET ORAL ONCE
Qty: 0 | Refills: 0 | Status: COMPLETED | OUTPATIENT
Start: 2018-11-24 | End: 2018-11-24

## 2018-11-24 RX ORDER — MAGNESIUM SULFATE 500 MG/ML
1 VIAL (ML) INJECTION ONCE
Qty: 0 | Refills: 0 | Status: COMPLETED | OUTPATIENT
Start: 2018-11-24 | End: 2018-11-24

## 2018-11-24 RX ORDER — POTASSIUM CHLORIDE 20 MEQ
10 PACKET (EA) ORAL
Qty: 0 | Refills: 0 | Status: DISCONTINUED | OUTPATIENT
Start: 2018-11-24 | End: 2018-11-24

## 2018-11-24 RX ORDER — ONDANSETRON 8 MG/1
4 TABLET, FILM COATED ORAL ONCE
Qty: 0 | Refills: 0 | Status: COMPLETED | OUTPATIENT
Start: 2018-11-24 | End: 2018-11-24

## 2018-11-24 RX ADMIN — PIPERACILLIN AND TAZOBACTAM 25 GRAM(S): 4; .5 INJECTION, POWDER, LYOPHILIZED, FOR SOLUTION INTRAVENOUS at 23:03

## 2018-11-24 RX ADMIN — Medication 4.89 MICROGRAM(S)/KG/MIN: at 03:19

## 2018-11-24 RX ADMIN — SODIUM CHLORIDE 1600 MILLILITER(S): 9 INJECTION INTRAMUSCULAR; INTRAVENOUS; SUBCUTANEOUS at 00:15

## 2018-11-24 RX ADMIN — Medication 400 MILLIGRAM(S): at 00:22

## 2018-11-24 RX ADMIN — Medication 40 MILLIEQUIVALENT(S): at 11:14

## 2018-11-24 RX ADMIN — SODIUM CHLORIDE 250 MILLILITER(S): 9 INJECTION, SOLUTION INTRAVENOUS at 02:23

## 2018-11-24 RX ADMIN — SODIUM CHLORIDE 150 MILLILITER(S): 9 INJECTION, SOLUTION INTRAVENOUS at 06:00

## 2018-11-24 RX ADMIN — SODIUM CHLORIDE 150 MILLILITER(S): 9 INJECTION, SOLUTION INTRAVENOUS at 23:03

## 2018-11-24 RX ADMIN — PIPERACILLIN AND TAZOBACTAM 25 GRAM(S): 4; .5 INJECTION, POWDER, LYOPHILIZED, FOR SOLUTION INTRAVENOUS at 16:19

## 2018-11-24 RX ADMIN — PIPERACILLIN AND TAZOBACTAM 3.38 GRAM(S): 4; .5 INJECTION, POWDER, LYOPHILIZED, FOR SOLUTION INTRAVENOUS at 00:10

## 2018-11-24 RX ADMIN — Medication 3.57 MICROGRAM(S)/KG/MIN: at 06:00

## 2018-11-24 RX ADMIN — Medication 1000 MILLIGRAM(S): at 00:40

## 2018-11-24 RX ADMIN — POTASSIUM PHOSPHATE, MONOBASIC POTASSIUM PHOSPHATE, DIBASIC 62.5 MILLIMOLE(S): 236; 224 INJECTION, SOLUTION INTRAVENOUS at 08:17

## 2018-11-24 RX ADMIN — Medication 100 GRAM(S): at 08:17

## 2018-11-24 RX ADMIN — PIPERACILLIN AND TAZOBACTAM 25 GRAM(S): 4; .5 INJECTION, POWDER, LYOPHILIZED, FOR SOLUTION INTRAVENOUS at 08:01

## 2018-11-24 NOTE — H&P ADULT - ATTENDING COMMENTS
critical care time 40 mins  Patient seen and examined.  Agree with resident note as above.  Patient with hx as noted including gallstones who presents with nausea and abdominal pain, found to have elevated LFts and lipase, as well as imaging consistent with choldocholithiasis/cholangitis and gallstone pancreatitis.  Patient was refractory to volume resuscitation in the ER, so was started on pressor support and is transported to the [facility] MICU for pressors and possible mechanical intervention for this process (ERCP?).  Patient is awake and alert, breathing comfortably, on Levophed.  Full plan as above - continued IVF, pressor support, abx and follow cx, surgery and GI to see today.  FULL CODE.  Identifies her son as surrogate decision maker.

## 2018-11-24 NOTE — CHART NOTE - NSCHARTNOTEFT_GEN_A_CORE
89 year old female with acute cholangitis/gallstone pancreatitis, transferred from [place] now with improved symptoms, denies any abdominal pain and currently afebrile. Her exam is unremarkable, no jaundice or abdominal tenderness. Labs remarkable for elevate WBC to 14, cultures pending, and t bili of 1.4 and lipase to 22,000. CT scan remarkable for dilated intra and extrahepatic biliary ducts and dilated CBD consistent with cholangitis.    Plan  IV antibiotics - zosyn appropriate coverage  urgent GI consult appreciated - recommendation MRCP by GI and ERCP sphincterotomy  no surgery offered at the moment given low risk of recurrence of symptoms of cholangitis in the elderly population if ERCP sphincterotomy performed for cholangitis.  will continue to follow

## 2018-11-24 NOTE — H&P ADULT - NSHPPHYSICALEXAM_GEN_ALL_CORE
PHYSICAL EXAM:  GENERAL: NAD, well-developed  HEAD:  Atraumatic, Normocephalic  EYES: EOMI, PERRLA, conjunctiva and sclera clear  NECK: Supple, No JVD  CHEST/LUNG: Clear to auscultation bilaterally; No wheeze  HEART: Regular rate and rhythm; No murmurs, rubs, or gallops  ABDOMEN: Soft, Nontender, Nondistended; Bowel sounds present  EXTREMITIES:  2+ Peripheral Pulses, No clubbing, cyanosis, or edema  PSYCH: AAOx3  NEUROLOGY: non-focal  SKIN: No rashes or lesions PHYSICAL EXAM:  GENERAL: NAD, well-developed, pleasant, A&O x 4  HEAD:  Atraumatic, Normocephalic  EYES: EOMI, PERRLA  NECK: Supple, R IJ in place, d/i/c  CHEST/LUNG: Clear to auscultation bilaterally; No wheeze  HEART: tachycardic; 2/6 systolic murmurs, no rubs, or gallops  ABDOMEN: Soft, Nontender. Bowel sounds present. Endorses to mild pain with deep palpation of the RUQ, and pressure in other areas. No rebound/guarding. Neg Albright sign.  EXTREMITIES:  2+ Peripheral Pulses, No clubbing, cyanosis, or edema  PSYCH: AAOx4  NEUROLOGY: non-focal  SKIN: No rashes or lesions PHYSICAL EXAM:  ICU Vital Signs Last 24 Hrs  T(C): 38.4 (24 Nov 2018 05:20), Max: 38.7 (23 Nov 2018 23:03)  T(F): 101.2 (24 Nov 2018 05:20), Max: 101.7 (23 Nov 2018 23:03)  HR: 116 (24 Nov 2018 07:00) (107 - 143)  BP: 98/54 (24 Nov 2018 07:00) (85/53 - 149/75)  BP(mean): 73 (24 Nov 2018 07:00) (64 - 88)  RR: 21 (24 Nov 2018 07:00) (16 - 36)  SpO2: 100% (24 Nov 2018 07:00) (88% - 100%)    GENERAL: NAD, well-developed, pleasant, A&O x 4  HEAD:  Atraumatic, Normocephalic  EYES: EOMI, PERRLA  NECK: Supple, R IJ in place, d/i/c  CHEST/LUNG: Clear to auscultation bilaterally; No wheeze  HEART: tachycardic; 2/6 systolic murmurs, no rubs, or gallops  ABDOMEN: Soft, Nontender. Bowel sounds present. Endorses to mild pain with deep palpation of the RUQ, and pressure in other areas. No rebound/guarding. Neg Albright sign.  EXTREMITIES:  2+ Peripheral Pulses, No clubbing, cyanosis, or edema  PSYCH: AAOx4  NEUROLOGY: non-focal  SKIN: No rashes or lesions

## 2018-11-24 NOTE — PROGRESS NOTE ADULT - SUBJECTIVE AND OBJECTIVE BOX
surgery called to evaluate 89f with known hx of gallstones, who comes in for gallstone pancreatitis attack, after indulging in thanksgiving feast yesterday. Abdominal pain started this AM, with nausea/vomitting episode. Febrile in ED to 101.4, tachycardic to 120. Patient is alert and awake, all history obtained by daughter present bedside. As per patient, pain is still present in RUQ  and epigastric region. Denies any chest pain or sob.      ICU Vital Signs Last 24 Hrs  T(C): 38.7 (23 Nov 2018 23:03), Max: 38.7 (23 Nov 2018 23:03)  T(F): 101.7 (23 Nov 2018 23:03), Max: 101.7 (23 Nov 2018 23:03)  HR: 109 (24 Nov 2018 02:30) (109 - 134)  BP: 99/53 (24 Nov 2018 02:30) (99/53 - 149/75)  BP(mean): --  ABP: --  ABP(mean): --  RR: 24 (24 Nov 2018 02:30) (16 - 24)  SpO2: 98% (24 Nov 2018 02:30) (93% - 100%)      Gen aaxo3 nad  cardiac s1 s2 tachycardia  lungs clear  abd +schwartz epigastric tenderness, non distended, no peritoneal signs,   ext no edema                            13.5   3.41  )-----------( 192      ( 23 Nov 2018 23:27 )             41.0   11-23    138  |  105  |  9   ----------------------------<  108<H>  3.6   |  25  |  1.28    Ca    8.6      23 Nov 2018 23:27  Mg     1.6     11-23    TPro  6.8  /  Alb  3.4  /  TBili  1.5<H>  /  DBili  x   /  AST  930<H>  /  ALT  379<H>  /  AlkPhos  298<H>  11-23    < from: PACS Image (11.24.18 @ 02:06) >      < end of copied text >  < from: PACS Image (11.24.18 @ 01:29) >      < end of copied text >

## 2018-11-24 NOTE — CONSULT NOTE ADULT - SUBJECTIVE AND OBJECTIVE BOX
GENERAL SURGERY CONSULT NOTE  Attending: Dr. Burgos  Service: ATP  Contact: [id number]    HPI: 89F w/hx of HTN and depression transferred from [facility] with cholangitis for possible ERCP. She initially presented with fever/chills, RUQ/epigastric abdominal pain and N/V. At OSH she was febrile to 101.4 and tachycardic to 120 with lipase 22,913, lactate 4.6 (to 2.6 with IVF resuscitation), Tbili 1.5 and CT demonstrated intra and extrahepatic ductal dilatation, pancreatic ductal dilatration, and choledocholithiasis with common duct wall thickening.  ROS: 10-point ROS otherwise negative except as noted above.  PMH/PSH  Depression  HTN (hypertension)    MEDICATIONS  acetaminophen   Tablet .. 650 milliGRAM(s) Oral every 6 hours PRN  lactated ringers. 1000 milliLiter(s) IV Continuous <Continuous>  norepinephrine Infusion 0.04 MICROgram(s)/kG/Min IV Continuous <Continuous>  piperacillin/tazobactam IVPB. 3.375 Gram(s) IV Intermittent every 8 hours  potassium chloride   Solution 40 milliEquivalent(s) Oral once    Allergies  codeine (Unknown)    Social    Physical Exam  T(C): 37.7 (18 @ 08:00), Max: 38.7 (18 @ 23:03)  HR: 108 (18 @ 09:00) (107 - 143)  BP: 98/53 (18 @ 08:45) (85/53 - 149/75)  RR: 29 (18 @ 09:00) (16 - 36)  SpO2: 100% (18 @ 09:00) (88% - 100%)  Tmax: T(C): , Max: 38.7 (18 @ 23:03)    UOP: Incontinent    Gen: NAD  Neuro: AAOx3  HEENT: normocephalic, atraumatic, no scleral icterus  CV:   Pulm:  Abd:  Ext: warm, no edema    LABS                        11.3   14.3  )-----------( 167      ( 2018 06:19 )             32.4         138  |  104  |  9   ----------------------------<  93  3.4<L>   |  22  |  1.14    Ca    8.1<L>      2018 06:19  Phos  2.0       Mg     1.5         TPro  5.3<L>  /  Alb  3.0<L>  /  TBili  1.4<H>  /  DBili  x   /  AST  509<H>  /  ALT  333<H>  /  AlkPhos  194<H>      PT/INR - ( 2018 06:19 )   PT: 15.4 sec;   INR: 1.34 ratio    PTT - ( 2018 06:19 )  PTT:26.3 sec    Urinalysis Basic - ( 2018 00:51 )    Color: Yellow / Appearance: Clear / S.005 / pH: x  Gluc: x / Ketone: Negative  / Bili: Negative / Urobili: Negative mg/dL   Blood: x / Protein: 15 mg/dL / Nitrite: Negative   Leuk Esterase: Trace / RBC: 0-2 /HPF / WBC 0-2   Sq Epi: x / Non Sq Epi: Negative / Bacteria: Occasional    IMAGING  < from: CT Abdomen and Pelvis w/ Oral Cont and w/ IV Cont (18 @ 01:29) >  IMPRESSION:     Gallstone pancreatitis with moderate intrahepatic, extrahepatic, and   pancreatic ductal dilatation. Superimposed ascending cholangitis and/or   cholecystitis cannot be excluded. Small perihepatic ascites extends into   the right paracolic gutter.    Age-indeterminate severe L1 and moderate L3 compression fracture   deformities with mild L1 bony retropulsion into the lumbar canal.   Consider MRI as clinically indicated.    < end of copied text >    < from: US Abdomen Limited (18 @ 02:06) >  IMPRESSION:  Markedly limited study secondary to overlying bowel gas. Distended   gallbladder containing multiple gallstones. Gallbladder wall measures 3   mm with pericholecystic fluid. Consider correlation with HIDA scan if   there is concern for acute cholecystitis. Mild ascites.    Mild intra and extrahepatic biliary dilatation with common duct measures   6 mm in diameter. Suboptimal visualization of the common bile duct   secondary to overlying bowel gas. Refer to concurrent CT demonstrating   choledocholithiasis.    < end of copied text > GENERAL SURGERY CONSULT NOTE  Attending: Dr. Burgos  Service: ATP  Contact: [id number]    HPI: 89F w/hx of HTN and depression transferred from [facility] with cholangitis for possible ERCP. She initially presented with rigor/chills, epigastric abdominal pain radiating to the RUQ/LUQ, and nausea. Her symptoms began ~5pm on , and initially improved but then worsened so she presented to the ED. At OSH she was febrile to 101.4 and tachycardic to 120 with lipase 22,913, lactate 4.6 (to 2.6 with IVF resuscitation), Tbili 1.5 and CT demonstrated intra and extrahepatic ductal dilatation, pancreatic ductal dilatation and choledocholithiasis with common duct wall thickening.  ROS: 10-point ROS otherwise negative except as noted above.  PMH/PSH  Depression  HTN (hypertension)    MEDICATIONS  acetaminophen   Tablet .. 650 milliGRAM(s) Oral every 6 hours PRN  lactated ringers. 1000 milliLiter(s) IV Continuous <Continuous>  norepinephrine Infusion 0.04 MICROgram(s)/kG/Min IV Continuous <Continuous>  piperacillin/tazobactam IVPB. 3.375 Gram(s) IV Intermittent every 8 hours  potassium chloride   Solution 40 milliEquivalent(s) Oral once    Allergies  codeine (Unknown)    Social  Lives with daughter. Nonsmoker, no ETOH.    Physical Exam  T(C): 37.7 (18 @ 08:00), Max: 38.7 (18 @ 23:03)  HR: 108 (18 @ 09:00) (107 - 143)  BP: 98/53 (18 @ 08:45) (85/53 - 149/75)  RR: 29 (18 @ 09:00) (16 - 36)  SpO2: 100% (18 @ 09:00) (88% - 100%)  Tmax: T(C): , Max: 38.7 (18 @ 23:03)    Levophed at 0.06mcg/min  UOP: Incontinent    Gen: NAD  Neuro: AAOx3  HEENT: normocephalic, atraumatic, no scleral icterus  CV: RRR  Pulm: no increased WOB  Abd: soft, ND, NT  Ext: warm, no edema    LABS                        11.3   14.3  )-----------( 167      ( 2018 06:19 )             32.4         138  |  104  |  9   ----------------------------<  93  3.4<L>   |  22  |  1.14    Ca    8.1<L>      2018 06:19  Phos  2.0       Mg     1.5         TPro  5.3<L>  /  Alb  3.0<L>  /  TBili  1.4<H>  /  DBili  x   /  AST  509<H>  /  ALT  333<H>  /  AlkPhos  194<H>      PT/INR - ( 2018 06:19 )   PT: 15.4 sec;   INR: 1.34 ratio    PTT - ( 2018 06:19 )  PTT:26.3 sec    Urinalysis Basic - ( 2018 00:51 )    Color: Yellow / Appearance: Clear / S.005 / pH: x  Gluc: x / Ketone: Negative  / Bili: Negative / Urobili: Negative mg/dL   Blood: x / Protein: 15 mg/dL / Nitrite: Negative   Leuk Esterase: Trace / RBC: 0-2 /HPF / WBC 0-2   Sq Epi: x / Non Sq Epi: Negative / Bacteria: Occasional    IMAGING  < from: CT Abdomen and Pelvis w/ Oral Cont and w/ IV Cont (18 @ 01:29) >  IMPRESSION:     Gallstone pancreatitis with moderate intrahepatic, extrahepatic, and   pancreatic ductal dilatation. Superimposed ascending cholangitis and/or   cholecystitis cannot be excluded. Small perihepatic ascites extends into   the right paracolic gutter.    Age-indeterminate severe L1 and moderate L3 compression fracture   deformities with mild L1 bony retropulsion into the lumbar canal.   Consider MRI as clinically indicated.    < end of copied text >    < from: US Abdomen Limited (18 @ 02:06) >  IMPRESSION:  Markedly limited study secondary to overlying bowel gas. Distended   gallbladder containing multiple gallstones. Gallbladder wall measures 3   mm with pericholecystic fluid. Consider correlation with HIDA scan if   there is concern for acute cholecystitis. Mild ascites.    Mild intra and extrahepatic biliary dilatation with common duct measures   6 mm in diameter. Suboptimal visualization of the common bile duct   secondary to overlying bowel gas. Refer to concurrent CT demonstrating   choledocholithiasis.    < end of copied text > GENERAL SURGERY CONSULT NOTE  Attending: Dr. Burgos  Service: ATP  Contact: [id number]    HPI: 89F w/hx of HTN and depression transferred from [facility] with cholangitis for possible ERCP. She initially presented with rigor/chills, epigastric abdominal pain radiating to the RUQ/LUQ, and nausea. Her symptoms began ~5pm on , and initially improved but then worsened so she presented to the ED. At OSH she was febrile to 101.4 and tachycardic to 120 with lipase 22,913, lactate 4.6 (to 2.6 with IVF resuscitation), Tbili 1.5 and CT demonstrated intra and extrahepatic ductal dilatation, pancreatic ductal dilatation and choledocholithiasis with common duct wall thickening. Currently, pt states that her RUQ/epigastric pain is resolving, was better with pain medications initially and now is not needing any pain meds. No nausea currently.  ROS: 10-point ROS otherwise negative except as noted above.  PMH/PSH  Depression  HTN (hypertension)    MEDICATIONS  acetaminophen   Tablet .. 650 milliGRAM(s) Oral every 6 hours PRN  lactated ringers. 1000 milliLiter(s) IV Continuous <Continuous>  norepinephrine Infusion 0.04 MICROgram(s)/kG/Min IV Continuous <Continuous>  piperacillin/tazobactam IVPB. 3.375 Gram(s) IV Intermittent every 8 hours  potassium chloride   Solution 40 milliEquivalent(s) Oral once    Allergies  codeine (Unknown)    Social  Lives with daughter. Nonsmoker, no ETOH.    Family Hx: non-contributory    Physical Exam  T(C): 37.7 (18 @ 08:00), Max: 38.7 (18 @ 23:03)  HR: 108 (18 @ 09:00) (107 - 143)  BP: 98/53 (18 @ 08:45) (85/53 - 149/75)  RR: 29 (18 @ 09:00) (16 - 36)  SpO2: 100% (18 @ 09:00) (88% - 100%)  Tmax: T(C): , Max: 38.7 (18 @ 23:03)    Levophed at 0.06mcg/min  UOP: Incontinent    Gen: NAD  Neuro: AAOx3, moves all extremities  HEENT: normocephalic, atraumatic  Eyes: no scleral icterus  CV: RRR  Pulm: no increased WOB; CTA B/L  Abd: soft, ND, NT  Ext: warm, no edema  Psych: normal affect  Skin: no rashes    LABS                        11.3   14.3  )-----------( 167      ( 2018 06:19 )             32.4         138  |  104  |  9   ----------------------------<  93  3.4<L>   |  22  |  1.14    Ca    8.1<L>      2018 06:19  Phos  2.0       Mg     1.5         TPro  5.3<L>  /  Alb  3.0<L>  /  TBili  1.4<H>  /  DBili  x   /  AST  509<H>  /  ALT  333<H>  /  AlkPhos  194<H>      PT/INR - ( 2018 06:19 )   PT: 15.4 sec;   INR: 1.34 ratio    PTT - ( 2018 06:19 )  PTT:26.3 sec    Urinalysis Basic - ( 2018 00:51 )    Color: Yellow / Appearance: Clear / S.005 / pH: x  Gluc: x / Ketone: Negative  / Bili: Negative / Urobili: Negative mg/dL   Blood: x / Protein: 15 mg/dL / Nitrite: Negative   Leuk Esterase: Trace / RBC: 0-2 /HPF / WBC 0-2   Sq Epi: x / Non Sq Epi: Negative / Bacteria: Occasional    IMAGING  < from: CT Abdomen and Pelvis w/ Oral Cont and w/ IV Cont (18 @ 01:29) >  IMPRESSION:     Gallstone pancreatitis with moderate intrahepatic, extrahepatic, and   pancreatic ductal dilatation. Superimposed ascending cholangitis and/or   cholecystitis cannot be excluded. Small perihepatic ascites extends into   the right paracolic gutter.    Age-indeterminate severe L1 and moderate L3 compression fracture   deformities with mild L1 bony retropulsion into the lumbar canal.   Consider MRI as clinically indicated.    < end of copied text >    < from: US Abdomen Limited (18 @ 02:06) >  IMPRESSION:  Markedly limited study secondary to overlying bowel gas. Distended   gallbladder containing multiple gallstones. Gallbladder wall measures 3   mm with pericholecystic fluid. Consider correlation with HIDA scan if   there is concern for acute cholecystitis. Mild ascites.    Mild intra and extrahepatic biliary dilatation with common duct measures   6 mm in diameter. Suboptimal visualization of the common bile duct   secondary to overlying bowel gas. Refer to concurrent CT demonstrating   choledocholithiasis.    < end of copied text >

## 2018-11-24 NOTE — H&P ADULT - NSHPLABSRESULTS_GEN_ALL_CORE
13.5   3.41  )-----------( 192      ( 2018 23:27 )             41.0           138  |  105  |  9   ----------------------------<  108<H>  3.6   |  25  |  1.28    Ca    8.6      2018 23:27  Mg     1.6         TPro  6.8  /  Alb  3.4  /  TBili  1.5<H>  /  DBili  x   /  AST  930<H>  /  ALT  379<H>  /  AlkPhos  298<H>                Urinalysis Basic - ( 2018 00:51 )    Color: Yellow / Appearance: Clear / S.005 / pH: x  Gluc: x / Ketone: Negative  / Bili: Negative / Urobili: Negative mg/dL   Blood: x / Protein: 15 mg/dL / Nitrite: Negative   Leuk Esterase: Trace / RBC: 0-2 /HPF / WBC 0-2   Sq Epi: x / Non Sq Epi: Negative / Bacteria: Occasional        PT/INR - ( 2018 23:27 )   PT: 13.1 sec;   INR: 1.17 ratio         PTT - ( 2018 23:27 )  PTT:22.9 sec    Lactate Trend   @ 02:24 Lactate:2.9    @ 23:27 Lactate:4.6       CARDIAC MARKERS ( 2018 02:24 )  0.154 ng/mL / x     / x     / x     / x      CARDIAC MARKERS ( 2018 23:27 )  0.042 ng/mL / x     / 59 U/L / x     / x            CAPILLARY BLOOD GLUCOSE            Culture Results:   No growth at 5 days. ( @ 21:01)  Culture Results:   No growth at 5 days. ( @ 21:01) 13.5   3.41  )-----------( 192      ( 2018 23:27 )             41.0           138  |  105  |  9   ----------------------------<  108<H>  3.6   |  25  |  1.28    Ca    8.6      2018 23:27  Mg     1.6         TPro  6.8  /  Alb  3.4  /  TBili  1.5<H>  /  DBili  x   /  AST  930<H>  /  ALT  379<H>  /  AlkPhos  298<H>                Urinalysis Basic - ( 2018 00:51 )    Color: Yellow / Appearance: Clear / S.005 / pH: x  Gluc: x / Ketone: Negative  / Bili: Negative / Urobili: Negative mg/dL   Blood: x / Protein: 15 mg/dL / Nitrite: Negative   Leuk Esterase: Trace / RBC: 0-2 /HPF / WBC 0-2   Sq Epi: x / Non Sq Epi: Negative / Bacteria: Occasional        PT/INR - ( 2018 23:27 )   PT: 13.1 sec;   INR: 1.17 ratio         PTT - ( 2018 23:27 )  PTT:22.9 sec    Lactate Trend   @ 02:24 Lactate:2.9    @ 23:27 Lactate:4.6       CARDIAC MARKERS ( 2018 02:24 )  0.154 ng/mL / x     / x     / x     / x      CARDIAC MARKERS ( 2018 23:27 )  0.042 ng/mL / x     / 59 U/L / x     / x            CAPILLARY BLOOD GLUCOSE            Culture Results:   No growth at 5 days. ( @ 21:01)  Culture Results:   No growth at 5 days. ( @ 21:01)  < from: US Abdomen Limited (18 @ 02:06) >    IMPRESSION:  Markedly limited study secondary to overlying bowel gas. Distended   gallbladder containing multiple gallstones. Gallbladder wall measures 3   mm with pericholecystic fluid. Consider correlation with HIDA scan if   there is concern for acute cholecystitis. Mild ascites.    Mild intra and extrahepatic biliary dilatation with common duct measures   6 mm in diameter. Suboptimal visualization of the common bile duct   secondary to overlying bowel gas. Refer to concurrent CT demonstrating   choledocholithiasis.        < end of copied text >    < from: CT Abdomen and Pelvis w/ Oral Cont and w/ IV Cont (24.18 @ 01:29) >    IMPRESSION:     Gallstone pancreatitis with moderate intrahepatic, extrahepatic, and   pancreatic ductal dilatation. Superimposed ascending cholangitis and/or   cholecystitis cannot be excluded. Small perihepatic ascites extends into   the right paracolic gutter.    Age-indeterminate severe L1 and moderate L3 compression fracture   deformities with mild L1 bony retropulsion into the lumbar canal.   Consider MRI as clinically indicated.    < end of copied text > 13.5   3.41  )-----------( 192      ( 2018 23:27 )             41.0           138  |  105  |  9   ----------------------------<  108<H>  3.6   |  25  |  1.28    Ca    8.6      2018 23:27  Mg     1.6         TPro  6.8  /  Alb  3.4  /  TBili  1.5<H>  /  DBili  x   /  AST  930<H>  /  ALT  379<H>  /  AlkPhos  298<H>            Urinalysis Basic - ( 2018 00:51 )    Color: Yellow / Appearance: Clear / S.005 / pH: x  Gluc: x / Ketone: Negative  / Bili: Negative / Urobili: Negative mg/dL   Blood: x / Protein: 15 mg/dL / Nitrite: Negative   Leuk Esterase: Trace / RBC: 0-2 /HPF / WBC 0-2   Sq Epi: x / Non Sq Epi: Negative / Bacteria: Occasional    PT/INR - ( 2018 23:27 )   PT: 13.1 sec;   INR: 1.17 ratio         PTT - ( 2018 23:27 )  PTT:22.9 sec    Lactate Trend   @ 02:24 Lactate:2.9    @ 23:27 Lactate:4.6       CARDIAC MARKERS ( 2018 02:24 )  0.154 ng/mL / x     / x     / x     / x      CARDIAC MARKERS ( 2018 23:27 )  0.042 ng/mL / x     / 59 U/L / x     / x        Culture Results:   No growth at 5 days. ( @ 21:01)  Culture Results:   No growth at 5 days. ( @ 21:01)      Radiology:  Reviewed and interpreted by me.  < from: US Abdomen Limited (18 @ 02:06) >    IMPRESSION:  Markedly limited study secondary to overlying bowel gas. Distended   gallbladder containing multiple gallstones. Gallbladder wall measures 3   mm with pericholecystic fluid. Consider correlation with HIDA scan if   there is concern for acute cholecystitis. Mild ascites.    Mild intra and extrahepatic biliary dilatation with common duct measures   6 mm in diameter. Suboptimal visualization of the common bile duct   secondary to overlying bowel gas. Refer to concurrent CT demonstrating   choledocholithiasis.        < end of copied text >    < from: CT Abdomen and Pelvis w/ Oral Cont and w/ IV Cont (11.24.18 @ 01:29) >    IMPRESSION:     Gallstone pancreatitis with moderate intrahepatic, extrahepatic, and   pancreatic ductal dilatation. Superimposed ascending cholangitis and/or   cholecystitis cannot be excluded. Small perihepatic ascites extends into   the right paracolic gutter.    Age-indeterminate severe L1 and moderate L3 compression fracture   deformities with mild L1 bony retropulsion into the lumbar canal.   Consider MRI as clinically indicated.    < end of copied text >

## 2018-11-24 NOTE — CONSULT NOTE ADULT - ASSESSMENT
89F w/cholangitis and gallstone pancreatitis, currently in MICU on levophed.    - no acute surgical intervention  - agree with GI evaluation for urgent ERCP  - broad spectrum abx  - recommend strict I/O, Taylor catheter  - pressor support to be weaned as tolerated  - general surgery on board for possible cholecystectomy pending resolution of cholangitis and pancreatitis; however given age expectant management without cholecystectomy may be prudent as her lifetime risk of recurrent cholangitis/pancreatitis after ERCP with sphincterotomy is relatively low  - will d/w fellow Dr. Grimes on behalf of Dr. Burgos  - please call with questions    A Britni, R4  [id number] 89F w/cholangitis and gallstone pancreatitis, currently in MICU on levophed.    - no acute surgical intervention  - NPO/IV fluid resuscitation  - agree with GI evaluation for urgent ERCP  - broad spectrum abx  - recommend strict I/O, Taylor catheter  - pressor support to be weaned as tolerated  - general surgery on board for possible cholecystectomy pending resolution of cholangitis and pancreatitis; however given age expectant management without cholecystectomy may be prudent as her lifetime risk of recurrent cholangitis/pancreatitis after ERCP with sphincterotomy is relatively low  - will d/w fellow Dr. Grimes on behalf of Dr. Burgos  - please call with questions    A Britni, R4  [id number] 89F w/cholangitis and gallstone pancreatitis, currently in MICU on levophed.    - no acute surgical intervention  - NPO/IV fluid resuscitation  - agree with GI evaluation for urgent ERCP  - broad spectrum abx  - recommend strict I/O  - pressor support to be weaned as tolerated  - general surgery consulted for possible cholecystectomy pending resolution of cholangitis and pancreatitis; however given age, expectant management without cholecystectomy would be prudent as her lifetime risk of recurrent cholangitis/pancreatitis after ERCP with sphincterotomy is relatively low (~6-10% in 7 years)  - d/w fellow Dr. Grimes on behalf of Dr. Burgos  - please call with questions    A Britni, R4  [id number] 89F w/cholangitis and gallstone pancreatitis, currently in MICU on levophed.    - no acute surgical intervention  - NPO/IV fluid resuscitation  - agree with GI evaluation for urgent ERCP  - broad spectrum abx  - recommend strict I/O  - pressor support to be weaned as tolerated  - general surgery consulted for possible cholecystectomy pending resolution of cholangitis and pancreatitis; however given age, expectant management without cholecystectomy would be prudent as her lifetime risk of recurrent cholangitis/pancreatitis after ERCP with sphincterotomy is relatively low  - d/w fellow Dr. Grimes on behalf of Dr. Burgos  - please call with questions    A Britni, R4  [id number]

## 2018-11-24 NOTE — H&P ADULT - ASSESSMENT
89 y.o female with known hx of depression and HTN, otherwise functional at baseline presents from [facility] with abd pain, fever, n/v. CT concerning for gallstone pancreatitis, is hence transferred for possible ERCP.     #Neuro - no active issue    #CV   Sepsis: meets sepsis citeria based on fever and tachycardia, SBP in the 80s  -Hold home anti-HTN  -Cont Levo ggt with goal MAP > 65  -C/w Zosyn  -Repeat CBC, CMP, lactate and BCx  -C/w fluid resusitation  -Trend troponin    HTN:  -hold home anti-HTN in the setting of sepsis    #Pulm - no active issue, saturating well on 4L NC    #GI  Gallstone pancreatitis  -NPO for now for possible ERCP vs. Cholecystectomy  -GI consulted, ramiro recs  -Can consider surgery consult vs. HIDA scan  -Repeat CBC, CMP, lactate and BCx  -C/w miantainace IVF  -C/w Zosyn  -Serial abd exam    #Renal - No active issue    #Endo - No active issue     #ID  Gallstone pancreatits, with ?cholantigit/cholecystitis  -C/w Zosyn  -BCx x 2  -Daily CBC    #Heme - No active issue 89 y.o female with known hx of depression and HTN, otherwise functional at baseline presents from [facility] with abd pain, fever, n/v. CT concerning for gallstone pancreatitis, is hence transferred for possible ERCP.     #Neuro - no active issue    #CV   Sepsis: meets sepsis citeria based on fever and tachycardia, SBP in the 80s  -Hold home anti-HTN  -Cont Levo ggt with goal MAP > 65  -C/w Zosyn  -Repeat CBC, CMP, lactate and BCx  -C/w fluid resusitation  -Trend troponin    HTN:  -hold home anti-HTN in the setting of sepsis    #Pulm - no active issue, saturating well on 4L NC    #GI  Gallstone pancreatitis  -NPO for now for possible ERCP vs. Cholecystectomy  -GI consulted, ramiro recs  -Can consider surgery consult vs. HIDA scan  -Repeat CBC, CMP, lactate and BCx  -C/w maintenance IVF  -C/w Zosyn  -Serial abd exam    #Renal - No active issue    #Endo - No active issue     #ID  Gallstone pancreatitis with ?cholangitis/cholecystitis  -C/w Zosyn  -BCx x 2  -Daily CBC    #Heme - No active issue 89 y.o female with known hx of depression and HTN, otherwise functional at baseline presents from [facility] with abd pain, fever, n/v. CT concerning for gallstone pancreatitis, is hence transferred for possible ERCP.     #Neuro - no active issue    #CV   Sepsis: meets sepsis citeria based on fever and tachycardia, SBP in the 80s  -Hold home anti-HTN  -Cont Levo ggt with goal MAP > 65  -C/w Zosyn  -Repeat CBC, CMP, lactate and BCx  -C/w fluid resusitation  -Trend troponin, obtain EKG    HTN:  -hold home anti-HTN in the setting of sepsis    #Pulm - no active issue, saturating well on 4L NC    #GI  Gallstone pancreatitis  -NPO for now for possible ERCP vs. Cholecystectomy  -GI consulted, ramiro recs  -Can consider surgery consult vs. HIDA scan  -Repeat CBC, CMP, lactate and BCx  -C/w maintenance IVF  -C/w Zosyn  -Serial abd exam    #Renal - No active issue    #Endo - No active issue     #ID  Gallstone pancreatitis with ?cholangitis/cholecystitis  -C/w Zosyn  -BCx x 2  -Daily CBC    #Heme - No active issue 89 y.o female with known hx of depression and HTN, otherwise functional at baseline presents from [facility] with abd pain, fever, n/v. CT concerning for gallstone pancreatitis, is hence transferred for possible ERCP.     #Neuro - no active issue    #CV   Sepsis: meets sepsis citeria based on fever and tachycardia, SBP in the 80s  -Hold home anti-HTN  -Cont Levo ggt with goal MAP > 65  -C/w Zosyn  -Repeat CBC, CMP  -F/u BCx  -C/w fluid resuscitation  -Trend troponin, obtain EKG    HTN:  -hold home anti-HTN in the setting of sepsis    #Pulm - no active issue, saturating well on 4L NC    #GI  Gallstone pancreatitis  -NPO for now for possible ERCP vs. Cholecystectomy  -GI consulted, ramiro recs  -Can consider surgery consult vs. HIDA scan  -Repeat CBC, CMP  -F/u BCx  -C/w maintenance IVF  -C/w Zosyn  -Serial abd exam    #Renal - No active issue    #Endo - No active issue     #ID  Gallstone pancreatitis with ?cholangitis/cholecystitis  -C/w Zosyn  -F/u BCx x 2  -Daily CBC    #Heme - No active issue 89 y.o female with known hx of depression and HTN, otherwise functional at baseline presents from [facility] with abd pain, fever, n/v. CT concerning for gallstone pancreatitis, is hence transferred for possible ERCP.     #Neuro - no active issue    #CV   Sepsis: meets sepsis citeria based on fever and tachycardia, SBP in the 80s  -Hold home anti-HTN  -Cont Levo ggt with goal MAP > 65  -C/w Zosyn  -Repeat CBC, CMP  -F/u BCx  -C/w fluid resuscitation, 150cc/hr LR  -Trend troponin, obtain EKG    HTN:  -hold home anti-HTN in the setting of sepsis    #Pulm - no active issue, saturating well on 4L NC    #GI  Gallstone pancreatitis  -NPO for now for possible ERCP vs. Cholecystectomy  -GI consulted, ramiro recs  -Can consider surgery consult vs. HIDA scan  -Repeat CBC, CMP  -F/u BCx  -C/w maintenance IVF, 150cc/hr LR  -C/w Zosyn  -Serial abd exam    #Renal - No active issue    #Endo - No active issue     #ID  Gallstone pancreatitis with ?cholangitis/cholecystitis  -C/w Zosyn  -F/u BCx x 2  -Daily CBC    #Heme - No active issue 89 y.o female with known hx of depression and HTN, otherwise functional at baseline presents from [facility] with abd pain, fever, n/v. CT concerning for gallstone pancreatitis, is hence transferred for possible ERCP.     #Neuro - no active issue    #CV   Sepsis: meets sepsis citeria based on fever and tachycardia, SBP in the 80s  -Hold home anti-HTN  -Cont Levo ggt with goal MAP > 65  -C/w Zosyn  -Repeat CBC, CMP  -F/u BCx  -C/w fluid resuscitation, 150cc/hr LR  -Trend troponin, obtain EKG    HTN:  -hold home anti-HTN in the setting of sepsis    #Pulm - no active issue, saturating well on 4L NC    #GI  Gallstone pancreatitis  -NPO for now for possible ERCP vs. Cholecystectomy  -GI consulted, ramiro recs  -Can consider surgery consult vs. HIDA scan  -Repeat CBC, CMP  -F/u BCx  -C/w maintenance IVF, 150cc/hr LR  -C/w Zosyn  -Serial abd exam    #Renal - No active issue    #Endo - No active issue     #ID  Gallstone pancreatitis with ?cholangitis/cholecystitis  -C/w Zosyn  -F/u BCx x 2  -Daily CBC  -PRN tylenols and cooling blanket for fever    #Heme - No active issue 89 y.o female with known hx of depression and HTN, otherwise functional at baseline presents from [facility] with abd pain, fever, n/v. CT concerning for gallstone pancreatitis, is hence transferred for possible ERCP.     #Neuro - no active issue    #CV   Septic shock: meets sepsis criteria based on fever and tachycardia, SBP in the 80s requiring pressor support  -Hold home anti-HTN  -Cont Levo ggt with goal MAP > 65  -C/w Zosyn  -Repeat CBC, CMP  -F/u BCx  -C/w fluid resuscitation, 150cc/hr LR  -Trend troponin, obtain EKG    HTN:  -hold home anti-HTN in the setting of sepsis    #Pulm - no active issue, saturating well on 4L NC    #GI  Gallstone pancreatitis  -NPO for now for possible ERCP vs. Cholecystectomy  -GI consulted, ramiro recs  -Can consider surgery consult vs. HIDA scan  -Repeat CBC, CMP  -F/u BCx  -C/w maintenance IVF, 150cc/hr LR  -C/w Zosyn  -Serial abd exam    #Renal - No active issue    #Endo - No active issue     #ID  Gallstone pancreatitis with ?cholangitis/cholecystitis  -C/w Zosyn  -F/u BCx x 2, UCx  -Daily CBC  -PRN tylenols and cooling blanket for fever    #Heme - No active issue    #GOC  patient identifies son as surrogate decision maker  FULL CODE

## 2018-11-24 NOTE — ED POST DISCHARGE NOTE - RESULT SUMMARY
I spoke with Ana DANIELS [facility] and she is aware of + blood cultures.  Eleonora NICOLAS

## 2018-11-24 NOTE — PROGRESS NOTE ADULT - SUBJECTIVE AND OBJECTIVE BOX
HPI: 89yo F with SOB, abdominal pain - found to have multiple gallstones in CBD, severe pancreatitis, likely cholangitis, and hypotension/fevers/septic shock.  Seen and examined in ED with Dr RODRÍGUEZ Bird (ED MD) discussed in detail with patient, 2 daughters and son in regard to potential interventions including surgery, cholecystostomy, and ERCP.  Surgical evaluation suggested transfer to [address] [place] [address] for ERCP - family agreeable.  Treated with IV ABx, IVF but becoming hypotensive.  Started on levophed infusion with good result - CVL placed (see procedure note).      PAST MEDICAL & SURGICAL HISTORY:  Depression  HTN (hypertension)      Allergies    codeine (Unknown)    Height (cm): 154.94 ( @ 22:58)  Weight (kg): 52.2 ( @ 22:58)  BMI (kg/m2): 21.7 ( @ 22:58)    ICU Vital Signs Last 24 Hrs  T(C): 38.4 (2018 03:30), Max: 38.7 (2018 23:03)  T(F): 101.1 (2018 03:30), Max: 101.7 (2018 23:03)  HR: 139 (2018 04:10) (107 - 143)  BP: 101/56 (2018 04:10) (85/59 - 149/75)  BP(mean): 75 (2018 03:20) (75 - 75)  ABP: --  ABP(mean): --  RR: 26 (2018 04:10) (16 - 27)  SpO2: 100% (2018 04:10) (93% - 100%)          I&O's Summary                            13.5   3.41  )-----------( 192      ( 2018 23:27 )             41.0           138  |  105  |  9   ----------------------------<  108<H>  3.6   |  25  |  1.28    Ca    8.6      2018 23:27  Mg     1.6         TPro  6.8  /  Alb  3.4  /  TBili  1.5<H>  /  DBili  x   /  AST  930<H>  /  ALT  379<H>  /  AlkPhos  298<H>        CAPILLARY BLOOD GLUCOSE          LIVER FUNCTIONS - ( 2018 23:27 )  Alb: 3.4 g/dL / Pro: 6.8 gm/dL / ALK PHOS: 298 U/L / ALT: 379 U/L / AST: 930 U/L / GGT: x             CARDIAC MARKERS ( 2018 02:24 )  0.154 ng/mL / x     / x     / x     / x      CARDIAC MARKERS ( 2018 23:27 )  0.042 ng/mL / x     / 59 U/L / x     / x            PT/INR - ( 2018 23:27 )   PT: 13.1 sec;   INR: 1.17 ratio         PTT - ( 2018 23: )  PTT:22.9 sec        Urinalysis Basic - ( 2018 00:51 )    Color: Yellow / Appearance: Clear / S.005 / pH: x  Gluc: x / Ketone: Negative  / Bili: Negative / Urobili: Negative mg/dL   Blood: x / Protein: 15 mg/dL / Nitrite: Negative   Leuk Esterase: Trace / RBC: 0-2 /HPF / WBC 0-2   Sq Epi: x / Non Sq Epi: Negative / Bacteria: Occasional        MEDICATIONS  (STANDING):  lactated ringers. 1000 milliLiter(s) (250 mL/Hr) IV Continuous <Continuous>  norepinephrine Infusion 0.05 MICROgram(s)/kG/Min (4.894 mL/Hr) IV Continuous <Continuous>    MEDICATIONS  (PRN):          Advanced Directives: FULL  Discussed with: patient/family    Visit Information:  Critical care time 60 min.    ** Time is exclusive of billed procedures and/or teaching and/or routine family updates.

## 2018-11-24 NOTE — PROGRESS NOTE ADULT - ASSESSMENT
89M with gallstone pancreatitis, multiple stones in CBD, Inital [place] 2 (Missing LDH)      -likely cholangitic  -NPO  -aggressive Fluid hydration  -recommend transferr to [facility] for emergent ERCP, asERCP not available at [place]  -IV Abx  -Monitor vitals  -Serial abdominal exams  -trend lactic acid  -48hr [place]  -no acute surgical intervention at this time      plan d/w Dr Dailey and conveyed to ER Doctor regarding urgent X-avila
89yo F suffering from severe sepsis and septic shock due to acute cholangitis due to gallstones, as well as severe gallstone pancreatitis  high risk for deterioration, morbidity, mortality    Plan at this time is for transfer to 3* center for ERCP  continue ABx  Pressors  aggressive hydration  HOB 30  GI and DVT prophylaxis as appropriate  surgical input appreciated  supportive care

## 2018-11-24 NOTE — ED ADULT NURSE NOTE - NSIMPLEMENTINTERV_GEN_ALL_ED
Implemented All Universal Safety Interventions:  [place] to call system. Call bell, personal items and telephone within reach. Instruct patient to call for assistance. Room bathroom lighting operational. Non-slip footwear when patient is off stretcher. Physically safe environment: no spills, clutter or unnecessary equipment. Stretcher in lowest position, wheels locked, appropriate side rails in place.

## 2018-11-24 NOTE — H&P ADULT - HISTORY OF PRESENT ILLNESS
89 y.o female with known hx of depression and HTN, otherwise functional at baseline presents as a transfer from [place]. Presented to [place] ED for actue onset of fever, chill, abdominal pain with episodes of nausea/vomiting. The pain, per patient is located in the RUQ and epigastric region. Denies SOB, CP, no urinay symptoms.  In [place] ED, she was Febrile to 101.4, and tachycardic to 120, tachypneic to 23. Lactate was 4.6 -> 2.8, and lipase was [id number] & AST/ALT of 930/379, s/p 2.6L IVF. CTA showed gallstone pancreatitis with moderate intrahepatic, extrahepatic, and pancreatic ductal dilatation. Questionable Cholangitis and cholecystits. Abd US showed significant pericholecystic fluid and mulitple stones suggesting possible cholecystitis.      Patient has BCx pending under MRN [id number].    She is transferred to [facility] for possible ERCP.    Patient is full code at this time. 89 y.o female with known hx of depression and HTN, otherwise functional at baseline presents as a transfer from [place]. Presented to [place] ED for actue onset of fever, chill, abdominal pain with episodes of nausea/vomiting. The pain, per daughter was located in the RUQ and epigastric region. Denies SOB, CP, no urinary symptoms.  In [place] ED, she was Febrile to 101.4, and tachycardic to 120, tachypneic to 23. Lactate was 4.6 -> 2.8, and lipase was [id number] & AST/ALT of 930/379, s/p 2.6L IVF. CTA showed gallstone pancreatitis with moderate intrahepatic, extrahepatic, and pancreatic ductal dilatation. Questionable Cholangitis and cholecystits. Abd US showed significant pericholecystic fluid and mulitple stones suggesting possible cholecystitis.      At the time of my examination, patient denies abd pain, no nausea and no vomiting. She reports to be feeling a little warm, likely from the ambulance ride, but denies other symptoms, including CP, SOB, dysuria/hematuria, skin changes.     Patient has BCx pending under MRN [id number].    She is transferred to [facility] for possible ERCP.    Patient is full code at this time.

## 2018-11-24 NOTE — ED ADULT NURSE REASSESSMENT NOTE - NS ED NURSE REASSESS COMMENT FT1
pt. hypotensive, ED MD aware, MD to bedside, pt. awake and alert with no complaints of n/v/d, abd. pain, dizziness, CP.

## 2018-11-24 NOTE — CONSULT NOTE ADULT - SUBJECTIVE AND OBJECTIVE BOX
Chief Complaint:  Patient is a 89y old  Female who presents with a chief complaint of cholangitis (2018 09:52)      HPI:  The patient is a 89 y.o female with PMHx HTN transfer from [place] for possible ERCP. The patient presented to [place] ED for acute onset of fever, chill, abdominal pain with episodes of nausea/vomiting. There she was Febrile to 101.4, and tachycardic to 120, tachypneic to 23. Lactate was 4.6 -> 2.8, and lipase was 91232 & AST/ALT of 930/379, s/p 2.6L IVF. CT which showed Gallstone pancreatitis with moderate intrahepatic, extrahepatic, and pancreatic ductal dilatation. Superimposed ascending cholangitis and/or cholecystitis cannot be excluded. Small perihepatic ascites extends into the right paracolic gutter. Consider MRI as clinically indicated.    At arrival to [facility] the patient has been afebrile so far (Tmax 99.(9 on zosyn)) and is currently on Levophed which is being down-titrated.  Patient with noted leukocytosis and continued elevated but improving LFT's.  Patient is currently without abdominal pain, N/V, SOB or confusion.    Allergies:  codeine (Unknown)      Home Medications:    Hospital Medications:  acetaminophen   Tablet .. 650 milliGRAM(s) Oral every 6 hours PRN  lactated ringers. 1000 milliLiter(s) IV Continuous <Continuous>  norepinephrine Infusion 0.04 MICROgram(s)/kG/Min IV Continuous <Continuous>  piperacillin/tazobactam IVPB. 3.375 Gram(s) IV Intermittent every 8 hours  potassium chloride   Solution 40 milliEquivalent(s) Oral once      PMHX/PSHX:  Depression  HTN (hypertension)  No pertinent past medical history  No significant past surgical history      Family history:  No pertinent family history in first degree relatives      Social History:     ROS:     General:  No wt loss, fevers, chills, night sweats, fatigue,   Eyes:  Good vision, no reported pain  ENT:  No sore throat, pain, runny nose, dysphagia  CV:  No pain, palpitations, hypo/hypertension  Resp:  No dyspnea, cough, tachypnea, wheezing  GI:  See HPI  :  No pain, bleeding, incontinence, nocturia  Muscle:  No pain, weakness  Neuro:  No weakness, tingling, memory problems  Psych:  No fatigue, insomnia, mood problems, depression  Endocrine:  No polyuria, polydipsia, cold/heat intolerance  Heme:  No petechiae, ecchymosis, easy bruisability  Skin:  No rash, edema      PHYSICAL EXAM:     GENERAL:  Appears stated age, well-groomed, well-nourished, NAD  CHEST:  Full & symmetric excursion  HEART:  Regular rhythm, no abdominal bruit, no edema  ABDOMEN:  Soft, non-tender, non-distended, normoactive bowel sounds,  no masses , no hepatosplenomegaly  EXTREMITIES:  no cyanosis,clubbing or edema  SKIN:  No rash/erythema/ecchymoses/petechiae/wounds/abscess/warm/dry  NEURO:  Alert, oriented    Vital Signs:  Vital Signs Last 24 Hrs  T(C): 37.7 (2018 08:00), Max: 38.7 (2018 23:03)  T(F): 99.9 (2018 08:00), Max: 101.7 (2018 23:03)  HR: 106 (2018 10:30) (102 - 143)  BP: 114/55 (2018 10:15) (85/53 - 149/75)  BP(mean): 79 (2018 10:15) (64 - 90)  RR: 25 (2018 10:30) (10 - 36)  SpO2: 96% (2018 10:30) (88% - 100%)  Daily Height in cm: 152.4 (2018 05:20)    Daily     LABS:                        11.3   14.3  )-----------( 167      ( 2018 06:19 )             32.4     -24    138  |  104  |  9   ----------------------------<  93  3.4<L>   |  22  |  1.14    Ca    8.1<L>      2018 06:19  Phos  2.0     -24  Mg     1.5     -24    TPro  5.3<L>  /  Alb  3.0<L>  /  TBili  1.4<H>  /  DBili  x   /  AST  509<H>  /  ALT  333<H>  /  AlkPhos  194<H>  11-24    LIVER FUNCTIONS - ( 2018 06:19 )  Alb: 3.0 g/dL / Pro: 5.3 g/dL / ALK PHOS: 194 U/L / ALT: 333 U/L / AST: 509 U/L / GGT: x           PT/INR - ( 2018 06:19 )   PT: 15.4 sec;   INR: 1.34 ratio         PTT - ( 2018 06:19 )  PTT:26.3 sec  Urinalysis Basic - ( 2018 00:51 )    Color: Yellow / Appearance: Clear / S.005 / pH: x  Gluc: x / Ketone: Negative  / Bili: Negative / Urobili: Negative mg/dL   Blood: x / Protein: 15 mg/dL / Nitrite: Negative   Leuk Esterase: Trace / RBC: 0-2 /HPF / WBC 0-2   Sq Epi: x / Non Sq Epi: Negative / Bacteria: Occasional      Amylase Serum--      Lipase [id number]       Ammonia--      Imaging: Chief Complaint:  Patient is a 89y old  Female who presents with a chief complaint of cholangitis (2018 09:52)      HPI:  The patient is a 89 y.o female with PMHx HTN transfer from [place] for possible ERCP. The patient presented to [place] ED for acute onset of fever, chill, abdominal pain with episodes of nausea/vomiting. There she was Febrile to 101.4, and tachycardic to 120, tachypneic to 23. Lactate was 4.6 -> 2.8, and lipase was 43740 & AST/ALT of 930/379, s/p 2.6L IVF. CT which showed Gallstone pancreatitis with moderate intrahepatic, extrahepatic, and pancreatic ductal dilatation. Superimposed ascending cholangitis and/or cholecystitis cannot be excluded. Small perihepatic ascites extends into the right paracolic gutter. Consider MRI as clinically indicated.    At arrival to NS the patient has been afebrile so far (Tmax 99.(9 on zosyn)) and is currently on Levophed which is being down-titrated.  Patient with noted leukocytosis and continued elevated but improving LFT's.  Patient is currently without abdominal pain, N/V, SOB or confusion.    Allergies:  codeine (Unknown)      Home Medications:    Hospital Medications:  acetaminophen   Tablet .. 650 milliGRAM(s) Oral every 6 hours PRN  lactated ringers. 1000 milliLiter(s) IV Continuous <Continuous>  norepinephrine Infusion 0.04 MICROgram(s)/kG/Min IV Continuous <Continuous>  piperacillin/tazobactam IVPB. 3.375 Gram(s) IV Intermittent every 8 hours  potassium chloride   Solution 40 milliEquivalent(s) Oral once      PMHX/PSHX:  Depression  HTN (hypertension)  No pertinent past medical history  No significant past surgical history      Family history:  No pertinent family history in first degree relatives      Social History:     ROS:     General:  No wt loss, fevers, chills, night sweats, fatigue,   Eyes:  Good vision, no reported pain  ENT:  No sore throat, pain, runny nose, dysphagia  CV:  No pain, palpitations, hypo/hypertension  Resp:  No dyspnea, cough, tachypnea, wheezing  GI:  See HPI  :  No pain, bleeding, incontinence, nocturia  Muscle:  No pain, weakness  Neuro:  No weakness, tingling, memory problems  Psych:  No fatigue, insomnia, mood problems, depression  Endocrine:  No polyuria, polydipsia, cold/heat intolerance  Heme:  No petechiae, ecchymosis, easy bruisability  Skin:  No rash, edema      PHYSICAL EXAM:     GENERAL:  Appears stated age, well-groomed, well-nourished, NAD  CHEST:  Full & symmetric excursion  HEART:  Regular rhythm, no abdominal bruit, no edema  ABDOMEN:  Soft, non-tender, non-distended, normoactive bowel sounds,  no masses , no hepatosplenomegaly  EXTREMITIES:  no cyanosis,clubbing or edema  SKIN:  No rash/erythema/ecchymoses/petechiae/wounds/abscess/warm/dry  NEURO:  Alert, oriented    Vital Signs:  Vital Signs Last 24 Hrs  T(C): 37.7 (2018 08:00), Max: 38.7 (2018 23:03)  T(F): 99.9 (2018 08:00), Max: 101.7 (2018 23:03)  HR: 106 (2018 10:30) (102 - 143)  BP: 114/55 (2018 10:15) (85/53 - 149/75)  BP(mean): 79 (2018 10:15) (64 - 90)  RR: 25 (2018 10:30) (10 - 36)  SpO2: 96% (2018 10:30) (88% - 100%)  Daily Height in cm: 152.4 (2018 05:20)    Daily     LABS:                        11.3   14.3  )-----------( 167      ( 2018 06:19 )             32.4     -24    138  |  104  |  9   ----------------------------<  93  3.4<L>   |  22  |  1.14    Ca    8.1<L>      2018 06:19  Phos  2.0     -24  Mg     1.5     -24    TPro  5.3<L>  /  Alb  3.0<L>  /  TBili  1.4<H>  /  DBili  x   /  AST  509<H>  /  ALT  333<H>  /  AlkPhos  194<H>  11-24    LIVER FUNCTIONS - ( 2018 06:19 )  Alb: 3.0 g/dL / Pro: 5.3 g/dL / ALK PHOS: 194 U/L / ALT: 333 U/L / AST: 509 U/L / GGT: x           PT/INR - ( 2018 06:19 )   PT: 15.4 sec;   INR: 1.34 ratio         PTT - ( 2018 06:19 )  PTT:26.3 sec  Urinalysis Basic - ( 2018 00:51 )    Color: Yellow / Appearance: Clear / S.005 / pH: x  Gluc: x / Ketone: Negative  / Bili: Negative / Urobili: Negative mg/dL   Blood: x / Protein: 15 mg/dL / Nitrite: Negative   Leuk Esterase: Trace / RBC: 0-2 /HPF / WBC 0-2   Sq Epi: x / Non Sq Epi: Negative / Bacteria: Occasional      Amylase Serum--      Lipase [id number]       Ammonia--      Imaging:    < from: CT Abdomen and Pelvis w/ Oral Cont and w/ IV Cont (18 @ 01:29) >  FINDINGS:    LUNGS: Bibasilar interstitial prominence and edema. No pleural effusion.   Mild cardiac megaly.    LIVER/GALLBLADDER: Moderate intrahepatic and hepatic biliary ductal   dilatation with the common duct measuring up to 15 mm in diameter.   Multiple common duct stonesare present. Mild common duct wall thickening   and enhancement is present. Mild gallbladder wall thickening is noted.  Subcentimeter hypoenhancing structure in the right hepatic lobe, too   small to characterize. Small perihepatic ascites extends into the right   paracolic gutter.    PANCREAS: Mild peripancreatic haziness running the pancreatic head and   neck most consistent with pancreatitis. Focal pancreatic ductal   dilatation in the head and neck measures up to 6 mm.    SPLEEN: Normal in size and enhancement.    KIDNEYS: Left renal cysts. Mild nonspecific perinephric stranding. No   hydronephrosis.    ADRENAL GLANDS: Unremarkable.    BOWEL: Duodenal wall thickening and enhancement may be reactive due to   adjacent pancreatitis. No bowelobstruction or free intraperitoneal air.    URINARY BLADDER: Mildly distended.    PELVIC ORGANS: Unremarkable.    LYMPH NODES: No evidence of intra-abdominal or para-aortic   lymphadenopathy.    BONES/SOFT TISSUES: Age-indeterminate severe compression fracture   deformity of the L1 vertebral body with near complete loss of height   centrally and mild bony retropulsion is evident. Moderate L3 compression   fracture deformity is approximately 50% loss of height centrally. Diffuse   osteopenia. Advanced multilevel spondylosis.  Bilateral hip arthroplasties produce local streak artifact obscuring   immediately adjacent structures.    AORTA/MAJOR BRANCHES: Atheromatous calcifications along the aorta and   major branch vessels.    IMPRESSION:     Gallstone pancreatitis with moderate intrahepatic, extrahepatic, and   pancreatic ductal dilatation. Superimposed ascending cholangitis and/or   cholecystitis cannot be excluded. Small perihepatic ascites extends into   the right paracolic gutter.    Age-indeterminate severe L1 and moderate L3 compression fracture   deformities with mild L1 bony retropulsion into the lumbar canal.   Consider MRI as clinically indicated.      < end of copied text >

## 2018-11-24 NOTE — ED ADULT NURSE NOTE - OBJECTIVE STATEMENT
pt. c/o of general weakness, fever and SOB. pt. denies HA, visual changes, back pain, CP, abd. pain, n/v/d, burning upon urination, numbness, tingling. pt. has cough for a "few days". pt. states recent antibiotic use for URI but did not finish amoxicillin.

## 2018-11-24 NOTE — CONSULT NOTE ADULT - ASSESSMENT
Impression:  1) Pancreatitis with possible cholecystitis - patient with noted dilated ducts both intra, extra hepatic and CBD.  Patient however is now without any pain of systemic complaints which may be consistent with passes stone along with pancreatitis    Recommendation   - c/w IV gram negative antibiotics   - monitor VS and fever curve closely   - obtain MRCP   - possible ERCP depending on results and patients course   - keep patient NPO till after MRCP results discussed with GI   - if patient becomes hemodynamically stable at any point please contact GI immediately   - recommendations given to MICU attending in person on rounds   - case discussed with general GI and advanced GI attending and both aware of case    Minoo Ventura, PGY-4  Gastroenterology Fellow  Pager x [phone] or [phone]  (After 5 pm or on weekends please page GI on call) Impression:  1) Abdominal pain/fevers - Pancreatitis with possible cholecystitis/CBD stones - patient with noted dilated ducts both intra, extra hepatic and CBD.  Patient however is now without any pain of systemic complaints which may be consistent with passes stone along with pancreatitis    Recommendation   - c/w IV gram negative antibiotics   - monitor VS and fever curve closely   - obtain MRCP   - possible ERCP depending on results and patients course   - keep patient NPO till after MRCP results discussed with GI   - if patient becomes hemodynamically stable at any point please contact GI immediately   - recommendations given to MICU attending in person on rounds   - case discussed with general GI and advanced GI attending and both aware of case    Minoo Ventura, PGY-4  Gastroenterology Fellow  Pager x [phone] or [phone]  (After 5 pm or on weekends please page GI on call) Impression:  1) Abdominal pain/fevers - Pancreatitis with possible cholecystitis/CBD stones - patient with noted dilated ducts both intra, extra hepatic and CBD.  Patient however is now without any pain of systemic complaints which may be consistent with passes stone along with pancreatitis    Recommendation   - c/w IV gram negative antibiotics   - monitor VS and fever curve closely   - ERCP planned for tomorrow morning in the OR   - please keep patient NPO   - if patient becomes hemodynamically stable at any point please contact GI immediately   - recommendations given to MICU attending in person on rounds   - case discussed with general GI and advanced GI attending and both aware of case    Minoo Ventura, PGY-4  Gastroenterology Fellow  Pager x [phone] or [phone]  (After 5 pm or on weekends please page GI on call)

## 2018-11-24 NOTE — H&P ADULT - NSHPREVIEWOFSYSTEMS_GEN_ALL_CORE
REVIEW OF SYSTEMS:    CONSTITUTIONAL: + fevers or chills  EYES/ENT: No visual changes;  No vertigo or throat pain   NECK: No pain or stiffness  RESPIRATORY: No cough, wheezing, hemoptysis; No shortness of breath  CARDIOVASCULAR: No chest pain or palpitations  GASTROINTESTINAL: + abdominal or epigastric pain. + nausea, vomiting. No diarrhea or constipation. No melena or hematochezia.  GENITOURINARY: No dysuria, frequency or hematuria  NEUROLOGICAL: No numbness or weakness  SKIN: No itching, rashes REVIEW OF SYSTEMS:    CONSTITUTIONAL: + fevers or chills  EYES/ENT: No visual changes;  No vertigo or throat pain   NECK: No pain or stiffness  RESPIRATORY: No cough, wheezing, hemoptysis; No shortness of breath  CARDIOVASCULAR: No chest pain or palpitations  GASTROINTESTINAL: No abdominal or epigastric pain, no nausea, vomiting currently. No diarrhea or constipation. No melena or hematochezia.  GENITOURINARY: No dysuria, frequency or hematuria  NEUROLOGICAL: No numbness or weakness  SKIN: No itching, rashes

## 2018-11-25 LAB
ALBUMIN SERPL ELPH-MCNC: 2.7 G/DL — LOW (ref 3.3–5)
ALP SERPL-CCNC: 147 U/L — HIGH (ref 40–120)
ALT FLD-CCNC: 221 U/L — HIGH (ref 10–45)
ANION GAP SERPL CALC-SCNC: 12 MMOL/L — SIGNIFICANT CHANGE UP (ref 5–17)
ANION GAP SERPL CALC-SCNC: 12 MMOL/L — SIGNIFICANT CHANGE UP (ref 5–17)
APTT BLD: 30.2 SEC — SIGNIFICANT CHANGE UP (ref 27.5–36.3)
AST SERPL-CCNC: 184 U/L — HIGH (ref 10–40)
BILIRUB SERPL-MCNC: 0.7 MG/DL — SIGNIFICANT CHANGE UP (ref 0.2–1.2)
BUN SERPL-MCNC: 12 MG/DL — SIGNIFICANT CHANGE UP (ref 7–23)
BUN SERPL-MCNC: 13 MG/DL — SIGNIFICANT CHANGE UP (ref 7–23)
CALCIUM SERPL-MCNC: 7.9 MG/DL — LOW (ref 8.4–10.5)
CALCIUM SERPL-MCNC: 8.2 MG/DL — LOW (ref 8.4–10.5)
CHLORIDE SERPL-SCNC: 105 MMOL/L — SIGNIFICANT CHANGE UP (ref 96–108)
CHLORIDE SERPL-SCNC: 106 MMOL/L — SIGNIFICANT CHANGE UP (ref 96–108)
CO2 SERPL-SCNC: 22 MMOL/L — SIGNIFICANT CHANGE UP (ref 22–31)
CO2 SERPL-SCNC: 23 MMOL/L — SIGNIFICANT CHANGE UP (ref 22–31)
CREAT SERPL-MCNC: 1.04 MG/DL — SIGNIFICANT CHANGE UP (ref 0.5–1.3)
CREAT SERPL-MCNC: 1.06 MG/DL — SIGNIFICANT CHANGE UP (ref 0.5–1.3)
CULTURE RESULTS: NO GROWTH — SIGNIFICANT CHANGE UP
GLUCOSE SERPL-MCNC: 71 MG/DL — SIGNIFICANT CHANGE UP (ref 70–99)
GLUCOSE SERPL-MCNC: 84 MG/DL — SIGNIFICANT CHANGE UP (ref 70–99)
HCT VFR BLD CALC: 30.9 % — LOW (ref 34.5–45)
HCT VFR BLD CALC: 31.7 % — LOW (ref 34.5–45)
HGB BLD-MCNC: 10.5 G/DL — LOW (ref 11.5–15.5)
HGB BLD-MCNC: 10.7 G/DL — LOW (ref 11.5–15.5)
INR BLD: 1.77 RATIO — HIGH (ref 0.88–1.16)
MAGNESIUM SERPL-MCNC: 2 MG/DL — SIGNIFICANT CHANGE UP (ref 1.6–2.6)
MCHC RBC-ENTMCNC: 30.9 PG — SIGNIFICANT CHANGE UP (ref 27–34)
MCHC RBC-ENTMCNC: 31 PG — SIGNIFICANT CHANGE UP (ref 27–34)
MCHC RBC-ENTMCNC: 33.9 GM/DL — SIGNIFICANT CHANGE UP (ref 32–36)
MCHC RBC-ENTMCNC: 34 GM/DL — SIGNIFICANT CHANGE UP (ref 32–36)
MCV RBC AUTO: 90.9 FL — SIGNIFICANT CHANGE UP (ref 80–100)
MCV RBC AUTO: 91.5 FL — SIGNIFICANT CHANGE UP (ref 80–100)
PHOSPHATE SERPL-MCNC: 3.4 MG/DL — SIGNIFICANT CHANGE UP (ref 2.5–4.5)
PLATELET # BLD AUTO: 146 K/UL — LOW (ref 150–400)
PLATELET # BLD AUTO: 151 K/UL — SIGNIFICANT CHANGE UP (ref 150–400)
POTASSIUM SERPL-MCNC: 4 MMOL/L — SIGNIFICANT CHANGE UP (ref 3.5–5.3)
POTASSIUM SERPL-MCNC: 4.3 MMOL/L — SIGNIFICANT CHANGE UP (ref 3.5–5.3)
POTASSIUM SERPL-SCNC: 4 MMOL/L — SIGNIFICANT CHANGE UP (ref 3.5–5.3)
POTASSIUM SERPL-SCNC: 4.3 MMOL/L — SIGNIFICANT CHANGE UP (ref 3.5–5.3)
PROT SERPL-MCNC: 5.3 G/DL — LOW (ref 6–8.3)
PROTHROM AB SERPL-ACNC: 20.7 SEC — HIGH (ref 10–12.9)
RBC # BLD: 3.4 M/UL — LOW (ref 3.8–5.2)
RBC # BLD: 3.46 M/UL — LOW (ref 3.8–5.2)
RBC # FLD: 12.4 % — SIGNIFICANT CHANGE UP (ref 10.3–14.5)
RBC # FLD: 12.5 % — SIGNIFICANT CHANGE UP (ref 10.3–14.5)
SODIUM SERPL-SCNC: 140 MMOL/L — SIGNIFICANT CHANGE UP (ref 135–145)
SODIUM SERPL-SCNC: 140 MMOL/L — SIGNIFICANT CHANGE UP (ref 135–145)
SPECIMEN SOURCE: SIGNIFICANT CHANGE UP
TROPONIN T, HIGH SENSITIVITY RESULT: 107 NG/L — HIGH (ref 0–51)
TROPONIN T, HIGH SENSITIVITY RESULT: 131 NG/L — HIGH (ref 0–51)
WBC # BLD: 20.5 K/UL — HIGH (ref 3.8–10.5)
WBC # BLD: 22.8 K/UL — HIGH (ref 3.8–10.5)
WBC # FLD AUTO: 20.5 K/UL — HIGH (ref 3.8–10.5)
WBC # FLD AUTO: 22.8 K/UL — HIGH (ref 3.8–10.5)

## 2018-11-25 PROCEDURE — 93010 ELECTROCARDIOGRAM REPORT: CPT

## 2018-11-25 PROCEDURE — 99232 SBSQ HOSP IP/OBS MODERATE 35: CPT

## 2018-11-25 PROCEDURE — 99291 CRITICAL CARE FIRST HOUR: CPT

## 2018-11-25 PROCEDURE — 43274 ERCP DUCT STENT PLACEMENT: CPT | Mod: GC

## 2018-11-25 RX ADMIN — PIPERACILLIN AND TAZOBACTAM 25 GRAM(S): 4; .5 INJECTION, POWDER, LYOPHILIZED, FOR SOLUTION INTRAVENOUS at 15:30

## 2018-11-25 RX ADMIN — PIPERACILLIN AND TAZOBACTAM 25 GRAM(S): 4; .5 INJECTION, POWDER, LYOPHILIZED, FOR SOLUTION INTRAVENOUS at 07:09

## 2018-11-25 NOTE — PROGRESS NOTE ADULT - SUBJECTIVE AND OBJECTIVE BOX
Patient seen and examined in MICU, s/p urgent ERCP  Awake, alert, talking  non-toxic appearing  hemodynamically stable off of all pressors  oxygenating well  abd - nontender, nondistended, soft    - s/p ERCP for ascending cholangiolitis secondary to choledocholithiasis  - now with rapid reversal of organ failure and septic shock  - incomplete clearance of CBD, needs follow up ERCP as outpatient for full clearance  - no signs of acute cholecystitis at this time, no plans for cholecystectomy on this admission  - please reconsult PRN  - discussed with MICU team

## 2018-11-25 NOTE — PRE-ANESTHESIA EVALUATION ADULT - NSANTHOSAYNRD_GEN_A_CORE
No. KARLEY screening performed.  STOP BANG Legend: 0-2 = LOW Risk; 3-4 = INTERMEDIATE Risk; 5-8 = HIGH Risk

## 2018-11-25 NOTE — PROGRESS NOTE ADULT - ATTENDING COMMENTS
Patient seen and examined. Patient initially transferred to MICU from [facility] with hypotension requiring vasopressors. She was transferred to [facility] for Advanced GI evaluation.    1. Septic Shock due to Klebsiella bacteremia  - Continue broad spectrum antibiotics and followup repeat cultures  - Patient for ERCP and sphincterotomy today  - Maintain MAP > 65 - patient now off vasopressors  - Leukocytosis slightly worse today - if clinical deterioration would broaden antibiotics to Meropenem  2. Choledocholithiasis  - GI followup for ERCP  - Advance diet as per GI recs post procedure  3. Cardiology  - Mild troponin elevation likely in setting of demand  - Patient without acute EKG changes or chest pain/ACS symptoms    Critical Care Time 35 minutes

## 2018-11-25 NOTE — PROGRESS NOTE ADULT - SUBJECTIVE AND OBJECTIVE BOX
CHIEF COMPLAINT:    Interval Events:  Overnight, her troponins increased from 114 to 136.    REVIEW OF SYSTEMS:  The patient has no complaints. She denies chest pain, SOB or abdominal pain.     OBJECTIVE:  ICU Vital Signs Last 24 Hrs  T(C): 36.8 (2018 09:20), Max: 36.8 (2018 09:20)  T(F): 98.2 (2018 09:20), Max: 98.2 (2018 09:20)  HR: 88 (:20) (78 - 107)  BP: 145/66 (2018 09:20) (96/57 - 145/66)  BP(mean): 86 (2018 09:04) (72 - 104)  ABP: --  ABP(mean): --  RR: 24 (2018 09:20) (20 - 37)  SpO2: 99% (:04) (90% - 100%)         @ 07:  -   @ 07:00  --------------------------------------------------------  IN: 3120.4 mL / OUT: 850 mL / NET: 2270.4 mL     @ 07:01  -   @ 11:24  --------------------------------------------------------  IN: 150 mL / OUT: 100 mL / NET: 50 mL      CAPILLARY BLOOD GLUCOSE          PHYSICAL EXAM:  GENERAL: NAD  HEAD:  Atraumatic, Normocephalic  EYES: EOMI, PERRLA  CHEST/LUNG: Clear to auscultation bilaterally; No wheeze  HEART: S1/S2, RRR, no rubs, murmurs or gallops  ABDOMEN: Soft, Nontender. Bowel sounds present.  EXTREMITIES:  2+ Peripheral Pulses, No clubbing, cyanosis, or edema  PSYCH: AAOx3  NEUROLOGY: non-focal  SKIN: No rashes or lesions    HOSPITAL MEDICATIONS:  Standing Meds:  lactated ringers. 1000 milliLiter(s) IV Continuous <Continuous>  piperacillin/tazobactam IVPB. 3.375 Gram(s) IV Intermittent every 8 hours      PRN Meds:  acetaminophen   Tablet .. 650 milliGRAM(s) Oral every 6 hours PRN      LABS:                        10.5   20.5  )-----------( 146      ( 2018 01:13 )             30.9     Hgb Trend: 10.5<--, 11.3<--, 13.5<--  1125    140  |  106  |  12  ----------------------------<  84  4.3   |  22  |  1.06    Ca    7.9<L>      2018 01:13  Phos  3.4       Mg     2.0         TPro  5.3<L>  /  Alb  2.7<L>  /  TBili  0.7  /  DBili  x   /  AST  184<H>  /  ALT  221<H>  /  AlkPhos  147<H>      Creatinine Trend: 1.06<--, 1.07<--, 1.14<--, 1.28<--, 1.21<--  PT/INR - ( 2018 01:13 )   PT: 20.7 sec;   INR: 1.77 ratio         PTT - ( 2018 01:13 )  PTT:30.2 sec  Urinalysis Basic - ( 2018 00:51 )    Color: Yellow / Appearance: Clear / S.005 / pH: x  Gluc: x / Ketone: Negative  / Bili: Negative / Urobili: Negative mg/dL   Blood: x / Protein: 15 mg/dL / Nitrite: Negative   Leuk Esterase: Trace / RBC: 0-2 /HPF / WBC 0-2   Sq Epi: x / Non Sq Epi: Negative / Bacteria: Occasional        Venous Blood Gas:   @ 06:12  7.39/41/<50/24/60  VBG Lactate: 2.6      MICROBIOLOGY:     Culture - Blood (collected 2018 23:22)  Source: .Blood Blood  Gram Stain (2018 16:22):    Growth in anaerobic bottle: Gram Negative Rods    Growth in aerobic bottle: Gram Negative Rods  Preliminary Report (2018 16:22):    Growth in anaerobic bottle: Gram Negative Rods    Growth in aerobic bottle: Gram Negative Rods    "Due to technical problems, Proteus sp. will Not be reported as part of    the BCID panel until further notice"    ***Blood Panel PCR results on this specimenare available    approximately 3 hours after the Gram stain result.***    Gram stain, PCR, and/or culture results may not always    correspond due to difference in methodologies.    ************************************************************    This PCR assaywas performed using [employer] technology.    The following targets are tested for: Enterococcus,    vancomycin resistant enterococci, Listeria monocytogenes,    coagulase negative staphylococci, S. aureus,    methicillin resistant S. aureus, Streptococcus agalactiae    (Group B), S. pneumoniae, S. pyogenes (Group A),    Acinetobacter baumannii, Enterobacter cloacae, E. coli,    Klebsiella oxytoca, K. pneumoniae, Proteus sp.,    Serratia marcescens, Haemophilus influenzae,    Neisseria meningitidis, Pseudomonas aeruginosa, Candida    albicans, C. glabrata, C krusei, C parapsilosis,    C. tropicalis and the KPC resistance gene.  Organism: Blood Culture PCR (2018 17:45)  Organism: Blood Culture PCR (2018 17:45)    Culture - Blood (collected 2018 23:22)  Source: .Blood Blood  Gram Stain (2018 18:09):    Growth in anaerobic bottle: Gram Negative Rods    Growth in aerobic bottle: Gram Negative Rods  Preliminary Report (2018 18:10):    Growth in anaerobic bottle: Gram Negative Rods    Growth in aerobic bottle: Gram Negative Rods      RADIOLOGY:  [ ] Reviewed and interpreted by me    EKG:

## 2018-11-25 NOTE — CHART NOTE - NSCHARTNOTEFT_GEN_A_CORE
MICU TRANSFER NOTE    HPI: The patient is a 89 year old female with a PMH of depression and HTN, otherwise functional at baseline, who presents as a transfer from [place]. She presented to [place] ED for acute onset of fever, chills, and abdominal pain with episodes of nausea/vomiting. The pain was located in the RUQ and epigastric region. CT abdomen/pelvis showed gallstone pancreatitis with moderate intrahepatic, extrahepatic, and pancreatic ductal dilatation; with questionable Cholangitis and cholecystitis. Abdominal ultrasound showed significant pericholecystic fluid and multiple stones suggesting possible cholecystitis. She was transferred to [facility] MICU for possible ERCP. s/p MRCP on 11/24. s/p ERCP on 11/25 with 7 cm biliary stent placement. BCX was positive for klebsiella. Patient is currently on day 2 of zosyn.     A/P: This is a 89 year old female with a PMH of depression and HTN who presented with gallstone pancreatitis, s/p MRCP, s/p ERCP with 7 cm biliary stent placement, BCX positive for Klebsiella, currently on Day 2 of zosyn.     To follow up on:       The patient is full code at this time. MICU TRANSFER NOTE    HPI: The patient is a 89 year old female with a PMH of depression and HTN, otherwise functional at baseline, who presents as a transfer from [place]. She presented to [place] ED for acute onset of fever, chills, and abdominal pain with episodes of nausea/vomiting. The pain was located in the RUQ and epigastric region. CT abdomen/pelvis showed gallstone pancreatitis with moderate intrahepatic, extrahepatic, and pancreatic ductal dilatation; with questionable Cholangitis and cholecystitis. Abdominal ultrasound showed significant pericholecystic fluid and multiple stones suggesting possible cholecystitis. She was transferred to [facility] MICU for possible ERCP. s/p MRCP on 11/24. s/p ERCP on 11/25 with 7 cm biliary stent placement. BCX was positive for klebsiella. Patient is currently on day 2 of zosyn. The patient also has an elevated troponin level without acute EKG changes or chest pain/ACS symptoms.     A/P: This is a 89 year old female with a PMH of depression and HTN who presented with gallstone pancreatitis, s/p MRCP, s/p ERCP with 7 cm biliary stent placement, BCX positive for Klebsiella, currently on Day 2 of zosyn.     To follow up on:   - Continue to trend troponins  - follow up on repeat blood cultures  - continue with zosyn  - currently on a clear liquid diet. Resume regular diet tomorrow  - the patient is full code at this time MICU TRANSFER NOTE    HPI: The patient is a 89 year old female with a PMH of depression and HTN, otherwise functional at baseline, who presents as a transfer from [place]. She presented to [place] ED for acute onset of fever, chills, and abdominal pain with episodes of nausea/vomiting. The pain was located in the RUQ and epigastric region. CT abdomen/pelvis showed gallstone pancreatitis with moderate intrahepatic, extrahepatic, and pancreatic ductal dilatation; with questionable Cholangitis and cholecystitis. Abdominal ultrasound showed significant pericholecystic fluid and multiple stones suggesting possible cholecystitis. She was transferred to [facility] MICU for possible ERCP. s/p MRCP on 11/24. s/p ERCP on 11/25 with 7 cm biliary stent placement. BCX was positive for klebsiella. Patient is currently on day 2 of zosyn. The patient also has an elevated troponin level without acute EKG changes or chest pain/ACS symptoms.     A/P: This is a 89 year old female with a PMH of depression and HTN who presented with gallstone pancreatitis, s/p MRCP, s/p ERCP with 7 cm biliary stent placement, BCX positive for Klebsiella, currently on Day 2 of zosyn.     To follow up on:   - Continue to trend troponins  - follow up on repeat blood cultures  - continue with zosyn  - currently on a clear liquid diet. If tolerating, resume regular diet tomorrow  - if the patient develops abdominal pain, order an abdominal x-ray and order lipase/amylase to assess for post-ERCP pancreatitis  - the patient is full code at this time MICU TRANSFER NOTE    HPI: The patient is a 89 year old female with a PMH of depression and HTN, otherwise functional at baseline, who presents as a transfer from [place]. She presented to [place] ED for acute onset of fever, chills, and abdominal pain with episodes of nausea/vomiting. The pain was located in the RUQ and epigastric region. CT abdomen/pelvis showed gallstone pancreatitis with moderate intrahepatic, extrahepatic, and pancreatic ductal dilatation; with questionable Cholangitis and cholecystitis. Abdominal ultrasound showed significant pericholecystic fluid and multiple stones suggesting possible cholecystitis. She was transferred to [facility] MICU for possible ERCP. s/p MRCP on 11/24. s/p ERCP on 11/25 with 7 cm biliary stent placement. BCX was positive for klebsiella. Patient is currently on day 2 of zosyn. The patient also has an elevated troponin level without acute EKG changes or chest pain/ACS symptoms.     A/P: This is a 89 year old female with a PMH of depression and HTN who presented with gallstone pancreatitis, s/p MRCP, s/p ERCP with 7 cm biliary stent placement, BCX positive for Klebsiella, currently on Day 2 of zosyn.     To follow up on:   - Continue to trend troponins  - follow up on repeat blood cultures  - continue with zosyn  - currently on a clear liquid diet. If tolerating, resume regular diet tomorrow  - if the patient develops abdominal pain, order an abdominal x-ray and order lipase/amylase to assess for post-ERCP pancreatitis  - the patient is to follow up with outpatient GI within 4 weeks. Call [phone] for appointment.  - she is to repeat the ERCP within 3 months for stent/stone clearance MICU Transfer Note    Transfer from: MICU    Transfer to: ( x) Medicine    (  ) Telemetry     (   ) RCU        (    ) Palliative         (   ) Stroke Unit          (   ) __________________    Accepting Physician:     MICU COURSE:  The patient is a 89 year old female with a PMH of depression and HTN, otherwise functional at baseline, who presents as a transfer from [place]. She presented to [place] ED for acute onset of fever, chills, and abdominal pain with episodes of nausea/vomiting. The pain was located in the RUQ and epigastric region. CT abdomen/pelvis showed gallstone pancreatitis with moderate intrahepatic, extrahepatic, and pancreatic ductal dilatation; with questionable Cholangitis and cholecystitis. Abdominal ultrasound showed significant pericholecystic fluid and multiple stones suggesting possible cholecystitis. She was transferred to [facility] MICU for possible ERCP. s/p MRCP on 11/24. s/p ERCP on 11/25 with 7 cm biliary stent placement. BCX was positive for klebsiella. Patient is currently on day 2 of zosyn. The patient also has an elevated troponin level without acute EKG changes or chest pain/ACS symptoms.     A/P: This is a 89 year old female with a PMH of depression and HTN who presented with gallstone pancreatitis, s/p MRCP, s/p ERCP with 7 cm biliary stent placement, BCX positive for Klebsiella, currently on Day 2 of zosyn.     To follow up on:   - Continue to trend troponins  - follow up on repeat blood cultures  - continue with zosyn  - currently on a clear liquid diet. If tolerating, resume regular diet tomorrow  - if the patient develops abdominal pain, order an abdominal x-ray and order lipase/amylase to assess for post-ERCP pancreatitis  - the patient is to follow up with outpatient GI within 4 weeks. Call [phone] for appointment.  - she is to repeat the ERCP within 3 months for stent/stone clearance MICU Transfer Note    Transfer from: MICU    Transfer to: ( x) Medicine    (  ) Telemetry     (   ) RCU        (    ) Palliative         (   ) Stroke Unit          (   ) __________________    Accepting Physician:     MICU COURSE:  The patient is a 89 year old female with a PMH of depression and HTN, otherwise functional at baseline, who presents as a transfer from [place]. She presented to [place] ED for acute onset of fever, chills, and abdominal pain with episodes of nausea/vomiting. The pain was located in the RUQ and epigastric region. CT abdomen/pelvis showed gallstone pancreatitis with moderate intrahepatic, extrahepatic, and pancreatic ductal dilatation; with questionable Cholangitis and cholecystitis. Abdominal ultrasound showed significant pericholecystic fluid and multiple stones suggesting possible cholecystitis. She was transferred to [facility] MICU for possible ERCP. s/p MRCP on 11/24. s/p ERCP on 11/25 with 7 cm biliary stent placement. BCX was positive for klebsiella. Patient is currently on day 2 of zosyn. The patient also has an elevated troponin level without acute EKG changes or chest pain/ACS symptoms. Troponins have started to downtrend.    A/P: This is a 89 year old female with a PMH of depression and HTN who presented with gallstone pancreatitis, s/p MRCP, s/p ERCP with 7 cm biliary stent placement, BCX positive for Klebsiella, currently on Day 2 of zosyn.     To follow up on:   - follow up on repeat blood cultures  - continue with zosyn  - currently on a clear liquid diet. If tolerating, resume regular diet tomorrow  - if the patient develops abdominal pain, order an abdominal x-ray and order lipase/amylase to assess for post-ERCP pancreatitis  - the patient is to follow up with outpatient GI within 4 weeks. Call [phone] for appointment.  - she is to repeat the ERCP within 3 months for stent/stone clearance MICU Transfer Note    Transfer from: MICU    Transfer to: ( x) Medicine    (  ) Telemetry     (   ) RCU        (    ) Palliative         (   ) Stroke Unit          (   ) __________________    Accepting Physician: Dr. Canela    MICU COURSE:  The patient is a 89 year old female with a PMH of depression and HTN, otherwise functional at baseline, who presents as a transfer from [place]. She presented to [place] ED for acute onset of fever, chills, and abdominal pain with episodes of nausea/vomiting. The pain was located in the RUQ and epigastric region. CT abdomen/pelvis showed gallstone pancreatitis with moderate intrahepatic, extrahepatic, and pancreatic ductal dilatation; with questionable Cholangitis and cholecystitis. Abdominal ultrasound showed significant pericholecystic fluid and multiple stones suggesting possible cholecystitis. She was transferred to [facility] MICU for possible ERCP. s/p MRCP on 11/24. s/p ERCP on 11/25 with 7 cm biliary stent placement. BCX was positive for klebsiella. Patient is currently on day 2 of zosyn. The patient also has an elevated troponin level without acute EKG changes or chest pain/ACS symptoms. Troponins have started to downtrend.    A/P: This is a 89 year old female with a PMH of depression and HTN who presented with gallstone pancreatitis, s/p MRCP, s/p ERCP with 7 cm biliary stent placement, BCX positive for Klebsiella, currently on Day 2 of zosyn.     To follow up on:   - follow up on repeat blood cultures  - continue with zosyn  - currently on a clear liquid diet. If tolerating, resume regular diet tomorrow  - if the patient develops abdominal pain, order an abdominal x-ray and order lipase/amylase to assess for post-ERCP pancreatitis  - the patient is to follow up with outpatient GI within 4 weeks. Call [phone] for appointment.  - she is to repeat the ERCP within 3 months for stent/stone clearance MICU Transfer Note    Transfer from: MICU    Transfer to: ( x) Medicine    (  ) Telemetry     (   ) RCU        (    ) Palliative         (   ) Stroke Unit          (   ) __________________    Accepting Physician: Dr. Canela    MICU COURSE:  The patient is a 89 year old female with a PMH of depression and HTN, otherwise functional at baseline, who presents as a transfer from [place]. She presented to [place] ED for acute onset of fever, chills, and abdominal pain with episodes of nausea/vomiting. The pain was located in the RUQ and epigastric region. CT abdomen/pelvis showed gallstone pancreatitis with moderate intrahepatic, extrahepatic, and pancreatic ductal dilatation; with questionable Cholangitis and cholecystitis. Abdominal ultrasound showed significant pericholecystic fluid and multiple stones suggesting possible cholecystitis. She was transferred to [facility] MICU for possible ERCP. s/p MRCP on 11/24. s/p ERCP on 11/25 with 7 cm biliary stent placement. BCX was positive for klebsiella. Patient is currently on day 2 of zosyn. The patient also has an elevated troponin level without acute EKG changes or chest pain/ACS symptoms. Troponins have started to downtrend.    A/P: This is a 89 year old female with a PMH of depression and HTN who presented with gallstone pancreatitis, s/p MRCP, s/p ERCP with 7 cm biliary stent placement, BCX positive for Klebsiella, day 3 of antibiotics.    To follow up on:   - new onset afib, will need tele   - follow up on repeat blood cultures  - continue with ceftriaxone and flagyl for 2 days, then can transition to orals   - currently on a clear liquid diet. If tolerating, resume regular diet tomorrow  - if the patient develops abdominal pain, order an abdominal x-ray and order lipase/amylase to assess for post-ERCP pancreatitis  - the patient is to follow up with outpatient GI within 4 weeks. Call [phone] for appointment.  - she is to repeat the ERCP within 3 months for stent/stone clearance MICU Transfer Note    Transfer from: MICU    Transfer to: ( x) Medicine    (  ) Telemetry     (   ) RCU        (    ) Palliative         (   ) Stroke Unit          (   ) __________________    Accepting Physician: Dr. Canela    MICU COURSE:  The patient is a 89 year old female with a PMH of depression and HTN, otherwise functional at baseline, who presents as a transfer from [place]. She presented to [place] ED for acute onset of fever, chills, and abdominal pain with episodes of nausea/vomiting. The pain was located in the RUQ and epigastric region. CT abdomen/pelvis showed gallstone pancreatitis with moderate intrahepatic, extrahepatic, and pancreatic ductal dilatation; with questionable Cholangitis and cholecystitis. Abdominal ultrasound showed significant pericholecystic fluid and multiple stones suggesting possible cholecystitis. She was transferred to [facility] MICU for possible ERCP. s/p MRCP on 11/24. s/p ERCP on 11/25 with 7 cm biliary stent placement. BCX was positive for klebsiella. Patient is currently on day 2 of zosyn. The patient also has an elevated troponin level without acute EKG changes or chest pain/ACS symptoms. Troponins have started to downtrend. New onset Afib, restarted home Metoprolol. Patient HR in the 110's.     A/P: This is a 89 year old female with a PMH of depression and HTN who presented with gallstone pancreatitis, s/p MRCP, s/p ERCP with 7 cm biliary stent placement, BCX positive for Klebsiella, day 3 of antibiotics.    To follow up on:   - new onset afib, will need tele, on restarted home Metoprolol for rate control on 11/26   - follow up on repeat blood cultures  - continue with ceftriaxone and flagyl for 2 days, then can transition to orals   - currently on a clear liquid diet. If tolerating, resume regular diet tomorrow  - if the patient develops abdominal pain, order an abdominal x-ray and order lipase/amylase to assess for post-ERCP pancreatitis  - the patient is to follow up with outpatient GI within 4 weeks. Call [phone] for appointment.  - she is to repeat the ERCP within 3 months for stent/stone clearance MICU Transfer Note    Transfer from: MICU    Transfer to: ( x) Medicine    (  ) Telemetry     (   ) RCU        (    ) Palliative         (   ) Stroke Unit          (   ) __________________    Accepting Physician: Dr. Canela    MICU COURSE:  The patient is a 89 year old female with a PMH of depression and HTN, otherwise functional at baseline, who presents as a transfer from [place]. She presented to [place] ED for acute onset of fever, chills, and abdominal pain with episodes of nausea/vomiting. The pain was located in the RUQ and epigastric region. CT abdomen/pelvis showed gallstone pancreatitis with moderate intrahepatic, extrahepatic, and pancreatic ductal dilatation; with questionable Cholangitis and cholecystitis. Abdominal ultrasound showed significant pericholecystic fluid and multiple stones suggesting possible cholecystitis. She was transferred to [facility] MICU for possible ERCP. s/p MRCP on 11/24. s/p ERCP on 11/25 with 7 cm biliary stent placement. BCX was positive for klebsiella. Patient is currently on day 2 of zosyn. The patient also has an elevated troponin level without acute EKG changes or chest pain/ACS symptoms. Troponins have started to downtrend. New onset Afib, restarted home Metoprolol. Patient HR in the 110's.     A/P: This is a 89 year old female with a PMH of depression and HTN who presented with gallstone pancreatitis, s/p MRCP, s/p ERCP with 7 cm biliary stent placement, BCX positive for Klebsiella, day 3 of antibiotics.    To follow up on:   - new onset afib, will need tele, on home Metoprolol for rate control on 11/26. SBP 150s, restarted home Amlodipine 5mg  - follow up on repeat blood cultures (will be drawn AM 11/27)  - continue with ceftriaxone and flagyl for 2 days, then can transition to orals   - currently on a clear liquid diet. If tolerating, resume regular diet tomorrow  - if the patient develops abdominal pain, order an abdominal x-ray and order lipase/amylase to assess for post-ERCP pancreatitis  - the patient is to follow up with outpatient GI within 4 weeks. Call [phone] for appointment.  - she is to repeat the ERCP within 3 months for stent/stone clearance

## 2018-11-25 NOTE — PROGRESS NOTE ADULT - ASSESSMENT
89 y.o female with known hx of depression and HTN, otherwise functional at baseline presents from [facility] with abd pain, fever, n/v. CT concerning for gallstone pancreatitis and was transferred for possible ERCP.     #Neuro - no active issue    #CV   Septic shock:   - BP stable off pressors  - elevated troponins likely 2/2 demand ischemia  - will continue to trend troponin levels  - will obtain EKG after ERCP    HTN:  - holding home antihypertensive meds in the setting of sepsis    #Pulm - no active issues  -  saturating well on 2L NC    #GI  Gallstone pancreatitis  -NPO for ERCP today  -C/w maintenance LR at 50 cc/hr    #Renal - No active issue    #Endo - No active issue     #ID  Gallstone pancreatitis   - 11/23 BCX positive for Klebsiella  - c/w Zosyn  - if she starts to deteriorate clinically, will start meropenem  - trend CBC    #Heme - No active issue    #GOC  patient identifies son as surrogate decision maker  FULL CODE

## 2018-11-25 NOTE — CHART NOTE - NSCHARTNOTEFT_GEN_A_CORE
GI Advanced Endoscopy Attending Note    Pt seen and examined.  Discussed with GI [place] yesterday and today.  Surgical H&P and GI Consult note reviewed.  Pt with cholangitis due to choledocholithiasis  Improved after resuscitation/IV fluids/antibiotics and pressors titrated off yesterday.  LFTs improving.  Leukocytosis and INR worsening.  Elevated troponin noted.    Impression:  Severe cholangitis, improved after initial resuscitation.  Septic shock resolved.   Acute pancreatitis    Recommendations:  Urgent ERCP today for biliary drainage.

## 2018-11-26 ENCOUNTER — APPOINTMENT (OUTPATIENT)
Dept: FAMILY MEDICINE | Facility: CLINIC | Age: 83
End: 2018-11-26

## 2018-11-26 LAB
-  AMIKACIN: SIGNIFICANT CHANGE UP
-  AMOXICILLIN/CLAVULANIC ACID: SIGNIFICANT CHANGE UP
-  AMPICILLIN/SULBACTAM: SIGNIFICANT CHANGE UP
-  AMPICILLIN: SIGNIFICANT CHANGE UP
-  AZTREONAM: SIGNIFICANT CHANGE UP
-  CEFAZOLIN: SIGNIFICANT CHANGE UP
-  CEFEPIME: SIGNIFICANT CHANGE UP
-  CEFOTAXIME: SIGNIFICANT CHANGE UP
-  CEFOXITIN: SIGNIFICANT CHANGE UP
-  CEFTAZIDIME: SIGNIFICANT CHANGE UP
-  CEFTRIAXONE: SIGNIFICANT CHANGE UP
-  CEFUROXIME: SIGNIFICANT CHANGE UP
-  CIPROFLOXACIN: SIGNIFICANT CHANGE UP
-  ERTAPENEM: SIGNIFICANT CHANGE UP
-  GENTAMICIN: SIGNIFICANT CHANGE UP
-  IMIPENEM: SIGNIFICANT CHANGE UP
-  LEVOFLOXACIN: SIGNIFICANT CHANGE UP
-  MEROPENEM: SIGNIFICANT CHANGE UP
-  MOXIFLOXACIN(AEROBIC): SIGNIFICANT CHANGE UP
-  PIPERACILLIN/TAZOBACTAM: SIGNIFICANT CHANGE UP
-  TETRACYCLINE: SIGNIFICANT CHANGE UP
-  TIGECYCLINE: SIGNIFICANT CHANGE UP
-  TOBRAMYCIN: SIGNIFICANT CHANGE UP
-  TRIMETHOPRIM/SULFAMETHOXAZOLE: SIGNIFICANT CHANGE UP
ALBUMIN SERPL ELPH-MCNC: 2.7 G/DL — LOW (ref 3.3–5)
ALP SERPL-CCNC: 143 U/L — HIGH (ref 40–120)
ALT FLD-CCNC: 143 U/L — HIGH (ref 10–45)
ANION GAP SERPL CALC-SCNC: 11 MMOL/L — SIGNIFICANT CHANGE UP (ref 5–17)
APTT BLD: 21.6 SEC — LOW (ref 27.5–36.3)
AST SERPL-CCNC: 75 U/L — HIGH (ref 10–40)
BILIRUB SERPL-MCNC: 0.5 MG/DL — SIGNIFICANT CHANGE UP (ref 0.2–1.2)
BUN SERPL-MCNC: 12 MG/DL — SIGNIFICANT CHANGE UP (ref 7–23)
CALCIUM SERPL-MCNC: 8.3 MG/DL — LOW (ref 8.4–10.5)
CHLORIDE SERPL-SCNC: 106 MMOL/L — SIGNIFICANT CHANGE UP (ref 96–108)
CO2 SERPL-SCNC: 21 MMOL/L — LOW (ref 22–31)
CREAT SERPL-MCNC: 0.88 MG/DL — SIGNIFICANT CHANGE UP (ref 0.5–1.3)
GAS PNL BLDA: SIGNIFICANT CHANGE UP
GLUCOSE BLDC GLUCOMTR-MCNC: 105 MG/DL — HIGH (ref 70–99)
GLUCOSE BLDC GLUCOMTR-MCNC: 214 MG/DL — HIGH (ref 70–99)
GLUCOSE BLDC GLUCOMTR-MCNC: 61 MG/DL — LOW (ref 70–99)
GLUCOSE BLDC GLUCOMTR-MCNC: 90 MG/DL — SIGNIFICANT CHANGE UP (ref 70–99)
GLUCOSE SERPL-MCNC: 68 MG/DL — LOW (ref 70–99)
GRAM STN FLD: SIGNIFICANT CHANGE UP
HCT VFR BLD CALC: 31.7 % — LOW (ref 34.5–45)
HGB BLD-MCNC: 11.1 G/DL — LOW (ref 11.5–15.5)
INR BLD: 1.27 RATIO — HIGH (ref 0.88–1.16)
LIDOCAIN IGE QN: 354 U/L — HIGH (ref 7–60)
MAGNESIUM SERPL-MCNC: 1.9 MG/DL — SIGNIFICANT CHANGE UP (ref 1.6–2.6)
MCHC RBC-ENTMCNC: 32.1 PG — SIGNIFICANT CHANGE UP (ref 27–34)
MCHC RBC-ENTMCNC: 35.2 GM/DL — SIGNIFICANT CHANGE UP (ref 32–36)
MCV RBC AUTO: 91.1 FL — SIGNIFICANT CHANGE UP (ref 80–100)
METHOD TYPE: SIGNIFICANT CHANGE UP
PHOSPHATE SERPL-MCNC: 2.2 MG/DL — LOW (ref 2.5–4.5)
PLATELET # BLD AUTO: 141 K/UL — LOW (ref 150–400)
POTASSIUM SERPL-MCNC: 3.5 MMOL/L — SIGNIFICANT CHANGE UP (ref 3.5–5.3)
POTASSIUM SERPL-SCNC: 3.5 MMOL/L — SIGNIFICANT CHANGE UP (ref 3.5–5.3)
PROT SERPL-MCNC: 5.6 G/DL — LOW (ref 6–8.3)
PROTHROM AB SERPL-ACNC: 14.6 SEC — HIGH (ref 10–12.9)
RBC # BLD: 3.47 M/UL — LOW (ref 3.8–5.2)
RBC # FLD: 12 % — SIGNIFICANT CHANGE UP (ref 10.3–14.5)
SODIUM SERPL-SCNC: 138 MMOL/L — SIGNIFICANT CHANGE UP (ref 135–145)
WBC # BLD: 20.3 K/UL — HIGH (ref 3.8–10.5)
WBC # FLD AUTO: 20.3 K/UL — HIGH (ref 3.8–10.5)

## 2018-11-26 PROCEDURE — 99232 SBSQ HOSP IP/OBS MODERATE 35: CPT | Mod: GC

## 2018-11-26 PROCEDURE — 71045 X-RAY EXAM CHEST 1 VIEW: CPT | Mod: 26

## 2018-11-26 PROCEDURE — 99233 SBSQ HOSP IP/OBS HIGH 50: CPT | Mod: GC

## 2018-11-26 RX ORDER — METOPROLOL TARTRATE 50 MG
12.5 TABLET ORAL
Qty: 0 | Refills: 0 | Status: DISCONTINUED | OUTPATIENT
Start: 2018-11-26 | End: 2018-11-27

## 2018-11-26 RX ORDER — AMLODIPINE BESYLATE 2.5 MG/1
5 TABLET ORAL DAILY
Qty: 0 | Refills: 0 | Status: DISCONTINUED | OUTPATIENT
Start: 2018-11-26 | End: 2018-11-26

## 2018-11-26 RX ORDER — POTASSIUM CHLORIDE 20 MEQ
10 PACKET (EA) ORAL
Qty: 0 | Refills: 0 | Status: COMPLETED | OUTPATIENT
Start: 2018-11-26 | End: 2018-11-26

## 2018-11-26 RX ORDER — IPRATROPIUM/ALBUTEROL SULFATE 18-103MCG
3 AEROSOL WITH ADAPTER (GRAM) INHALATION ONCE
Qty: 0 | Refills: 0 | Status: COMPLETED | OUTPATIENT
Start: 2018-11-26 | End: 2018-11-26

## 2018-11-26 RX ORDER — CEFTRIAXONE 500 MG/1
1 INJECTION, POWDER, FOR SOLUTION INTRAMUSCULAR; INTRAVENOUS EVERY 24 HOURS
Qty: 0 | Refills: 0 | Status: DISCONTINUED | OUTPATIENT
Start: 2018-11-26 | End: 2018-11-27

## 2018-11-26 RX ORDER — SODIUM CHLORIDE 9 MG/ML
1000 INJECTION, SOLUTION INTRAVENOUS
Qty: 0 | Refills: 0 | Status: DISCONTINUED | OUTPATIENT
Start: 2018-11-26 | End: 2018-11-26

## 2018-11-26 RX ORDER — FUROSEMIDE 40 MG
20 TABLET ORAL ONCE
Qty: 0 | Refills: 0 | Status: COMPLETED | OUTPATIENT
Start: 2018-11-26 | End: 2018-11-26

## 2018-11-26 RX ORDER — POTASSIUM PHOSPHATE, MONOBASIC POTASSIUM PHOSPHATE, DIBASIC 236; 224 MG/ML; MG/ML
15 INJECTION, SOLUTION INTRAVENOUS ONCE
Qty: 0 | Refills: 0 | Status: COMPLETED | OUTPATIENT
Start: 2018-11-26 | End: 2018-11-26

## 2018-11-26 RX ORDER — METRONIDAZOLE 500 MG
500 TABLET ORAL EVERY 8 HOURS
Qty: 0 | Refills: 0 | Status: DISCONTINUED | OUTPATIENT
Start: 2018-11-26 | End: 2018-11-27

## 2018-11-26 RX ORDER — FUROSEMIDE 40 MG
40 TABLET ORAL ONCE
Qty: 0 | Refills: 0 | Status: DISCONTINUED | OUTPATIENT
Start: 2018-11-26 | End: 2018-11-26

## 2018-11-26 RX ORDER — HYDRALAZINE HCL 50 MG
5 TABLET ORAL ONCE
Qty: 0 | Refills: 0 | Status: COMPLETED | OUTPATIENT
Start: 2018-11-26 | End: 2018-11-26

## 2018-11-26 RX ORDER — MAGNESIUM SULFATE 500 MG/ML
2 VIAL (ML) INJECTION ONCE
Qty: 0 | Refills: 0 | Status: COMPLETED | OUTPATIENT
Start: 2018-11-26 | End: 2018-11-26

## 2018-11-26 RX ORDER — DEXTROSE 50 % IN WATER 50 %
50 SYRINGE (ML) INTRAVENOUS ONCE
Qty: 0 | Refills: 0 | Status: COMPLETED | OUTPATIENT
Start: 2018-11-26 | End: 2018-11-26

## 2018-11-26 RX ORDER — HEPARIN SODIUM 5000 [USP'U]/ML
5000 INJECTION INTRAVENOUS; SUBCUTANEOUS EVERY 8 HOURS
Qty: 0 | Refills: 0 | Status: DISCONTINUED | OUTPATIENT
Start: 2018-11-26 | End: 2018-11-27

## 2018-11-26 RX ADMIN — PIPERACILLIN AND TAZOBACTAM 25 GRAM(S): 4; .5 INJECTION, POWDER, LYOPHILIZED, FOR SOLUTION INTRAVENOUS at 06:13

## 2018-11-26 RX ADMIN — Medication 50 MILLIEQUIVALENT(S): at 01:42

## 2018-11-26 RX ADMIN — Medication 20 MILLIGRAM(S): at 04:49

## 2018-11-26 RX ADMIN — Medication 12.5 MILLIGRAM(S): at 02:13

## 2018-11-26 RX ADMIN — Medication 50 GRAM(S): at 02:13

## 2018-11-26 RX ADMIN — Medication 12.5 MILLIGRAM(S): at 17:49

## 2018-11-26 RX ADMIN — HEPARIN SODIUM 5000 UNIT(S): 5000 INJECTION INTRAVENOUS; SUBCUTANEOUS at 14:20

## 2018-11-26 RX ADMIN — Medication 100 MILLIGRAM(S): at 21:39

## 2018-11-26 RX ADMIN — Medication 5 MILLIGRAM(S): at 03:16

## 2018-11-26 RX ADMIN — Medication 3 MILLILITER(S): at 05:29

## 2018-11-26 RX ADMIN — PIPERACILLIN AND TAZOBACTAM 25 GRAM(S): 4; .5 INJECTION, POWDER, LYOPHILIZED, FOR SOLUTION INTRAVENOUS at 00:47

## 2018-11-26 RX ADMIN — CEFTRIAXONE 100 GRAM(S): 500 INJECTION, POWDER, FOR SOLUTION INTRAMUSCULAR; INTRAVENOUS at 12:56

## 2018-11-26 RX ADMIN — HEPARIN SODIUM 5000 UNIT(S): 5000 INJECTION INTRAVENOUS; SUBCUTANEOUS at 05:21

## 2018-11-26 RX ADMIN — HEPARIN SODIUM 5000 UNIT(S): 5000 INJECTION INTRAVENOUS; SUBCUTANEOUS at 21:39

## 2018-11-26 RX ADMIN — Medication 50 MILLILITER(S): at 01:33

## 2018-11-26 RX ADMIN — POTASSIUM PHOSPHATE, MONOBASIC POTASSIUM PHOSPHATE, DIBASIC 62.5 MILLIMOLE(S): 236; 224 INJECTION, SOLUTION INTRAVENOUS at 01:37

## 2018-11-26 RX ADMIN — Medication 50 MILLIEQUIVALENT(S): at 03:16

## 2018-11-26 RX ADMIN — Medication 100 MILLIGRAM(S): at 13:41

## 2018-11-26 RX ADMIN — SODIUM CHLORIDE 75 MILLILITER(S): 9 INJECTION, SOLUTION INTRAVENOUS at 02:07

## 2018-11-26 NOTE — PROGRESS NOTE ADULT - SUBJECTIVE AND OBJECTIVE BOX
Chief Complaint:  Patient is a 89y old  Female who presents with a chief complaint of cholangitis (2018 09:52)      Interval Events:   Patient doing well after sphincterotomy yesterday. Please see procedure note for full report.      Allergies:  codeine (Unknown)      Hospital Medications:  acetaminophen   Tablet .. 650 milliGRAM(s) Oral every 6 hours PRN  heparin  Injectable 5000 Unit(s) SubCutaneous every 8 hours  metoprolol tartrate 12.5 milliGRAM(s) Oral two times a day  piperacillin/tazobactam IVPB. 3.375 Gram(s) IV Intermittent every 8 hours      PMHX/PSHX:  Depression  HTN (hypertension)  No pertinent past medical history  No significant past surgical history      Family history:  No pertinent family history in first degree relatives          PHYSICAL EXAM:     GENERAL:  Appears stated age, well-groomed, well-nourished, no distress  HEENT:  NC/AT,  conjunctivae clear, sclera -anicteric  CHEST:  Full & symmetric excursion, no increased  HEART:  Regular rhythm  ABDOMEN:  Soft, non-tender, non-distended, normoactive bowel sounds,  no masses ,no hepato-splenomegaly,   EXTREMITIES:  no cyanosis,clubbing or edema  SKIN:  No rash/erythema/ecchymoses/petechiae/wounds/abscess/warm/dry  NEURO:  Alert, oriented    Vital Signs:  Vital Signs Last 24 Hrs  T(C): 36.9 (2018 08:00), Max: 37.2 (2018 00:00)  T(F): 98.5 (2018 08:00), Max: 98.9 (2018 00:00)  HR: 90 (2018 08:00) (73 - 99)  BP: 119/56 (2018 08:00) (110/57 - 173/79)  BP(mean): 81 (2018 08:00) (78 - 113)  RR: 24 (2018 08:00) (19 - 32)  SpO2: 94% (2018 08:00) (91% - 100%)  Daily Height in cm: 152.6 (2018 09:20)    Daily Weight in k (2018 04:00)    LABS:                        11.1   20.3  )-----------( 141      ( 2018 00:43 )             31.7     11-26    138  |  106  |  12  ----------------------------<  68<L>  3.5   |  21<L>  |  0.88    Ca    8.3<L>      2018 00:43  Phos  2.2       Mg     1.9         TPro  5.6<L>  /  Alb  2.7<L>  /  TBili  0.5  /  DBili  x   /  AST  75<H>  /  ALT  143<H>  /  AlkPhos  143<H>      LIVER FUNCTIONS - ( 2018 00:43 )  Alb: 2.7 g/dL / Pro: 5.6 g/dL / ALK PHOS: 143 U/L / ALT: 143 U/L / AST: 75 U/L / GGT: x           PT/INR - ( 2018 00:43 )   PT: 14.6 sec;   INR: 1.27 ratio         PTT - ( 2018 00:43 )  PTT:21.6 sec    Amylase Serum--      Lipase [id number]       Ammonia--      Imaging:

## 2018-11-26 NOTE — PROGRESS NOTE ADULT - ATTENDING COMMENTS
As above    Impression:    Pt with resolved cholangitis/septic shock.  Has multiple medium to large common bile duct stones.  S/p ERCP/biliary sphincterotomy/biliary stent placement 11/25/18 in the morning.  Doing well without recurrent abdominal pain.    Recommendations:    As above.  Follow CBC/LFTs and postrpocedure care / outpatient followup / repeat elective outpatient ERCP as above.

## 2018-11-26 NOTE — PHYSICAL THERAPY INITIAL EVALUATION ADULT - PERTINENT HX OF CURRENT PROBLEM, REHAB EVAL
89 y.o female with known hx of depression and HTN, otherwise functional at baseline presents from [facility] with abd pain, fever, n/v. CT concerning for gallstone pancreatitis, is hence transferred for possible ERCP. 89 y.o female with known hx of depression and HTN, otherwise functional at baseline presents from [facility] with abd pain, fever, n/v. CT concerning for gallstone pancreatitis, is hence transferred for possible ERCP. Pt s/p ERCP on 11/25. MR MRCP (11/24): extensive choledocholelithiasis.

## 2018-11-26 NOTE — PHYSICAL THERAPY INITIAL EVALUATION ADULT - GENERAL OBSERVATIONS, REHAB EVAL
Pt received semi-supine in bed, (+) NCO2, IV, MICU monitoring, external female catheter, NAD. Pt alert, agreeable to PT eval.

## 2018-11-26 NOTE — GOALS OF CARE CONVERSATION - PERSONAL ADVANCE DIRECTIVE - CONVERSATION DETAILS
LMSW assisted patient in completing Health Care Proxy form today. Patient assigned her son- Samuel Sousa as her primary Health Care Agent. Patient's alternate agent was appointed as her daughter- Susan Hoenrath (address: [address] [place] [address], Phone Number unknown to patient at this time).     In Optional Part 4- Patient wishes for her agent's to know that she does not want to be resuscitated if she is on a ventilator.     Copy placed in chart.     Social work remains available.

## 2018-11-26 NOTE — PROGRESS NOTE ADULT - ASSESSMENT
89 y.o female with known hx of depression and HTN, otherwise functional at baseline presents from [facility] with abd pain, fever, n/v. CT concerning for gallstone pancreatitis and was transferred for possible ERCP.     #Neuro - no active issue    #CV   Septic shock:   - BP stable off pressors  - elevated troponins likely 2/2 demand ischemia  - will continue to trend troponin levels  - will obtain EKG after ERCP    HTN:  - holding home antihypertensive meds in the setting of sepsis    #Pulm - no active issues  -  saturating well on 2L NC    #GI  Gallstone pancreatitis  -NPO for ERCP today  -C/w maintenance LR at 50 cc/hr    #Renal - No active issue    #Endo - No active issue     #ID  Gallstone pancreatitis   - 11/23 BCX positive for Klebsiella  - c/w Zosyn  - if she starts to deteriorate clinically, will start meropenem  - trend CBC    #Heme - No active issue    #GOC  patient identifies son as surrogate decision maker  FULL CODE 89 y.o female with known hx of depression and HTN, otherwise functional at baseline presents from [facility] with abd pain, fever, n/v. CT concerning for gallstone pancreatitis and was transferred for possible ERCP.     #Neuro - no active issue    #CV   Septic shock:   - BP stable off pressors  - elevated troponins likely 2/2 demand ischemia, now downtrending  - will obtain EKG after ERCP  - ?Afib overnight, will continue to monitor     HTN:  - holding home antihypertensive meds in the setting of sepsis    #Pulm   - saturating well on 2L NC  - wheezes on exam, s/p duonebs, 20IV lasix  - now euvolemic on exam    #GI  Gallstone pancreatitis  - on clear liquid diet, advance to solids tomorrow if patient tolerates   - discontinued LR due to possible volume overload, given lasix 20IV  - Repeat ERCP within 3 months for stent/stone clearance  - Followup in GI office within 4 weeks. Call [phone] for appointment    #Renal - No active issue    #Endo - No active issue     #ID  Gallstone pancreatitis   - 11/23 BCX positive for Klebsiella, repeat pending  - discontinued Zosyn, switched to Ceftriaxone and Flagyll   - will continue IV abx for 2 more days, then can transition to po  - if she starts to deteriorate clinically, will start meropenem  - trend CBC    #Heme - No active issue    #GOC  patient identifies son as surrogate decision maker  FULL CODE 89 y.o female with known hx of depression and HTN, otherwise functional at baseline presents from [facility] with abd pain, fever, n/v. CT concerning for gallstone pancreatitis and was transferred for possible ERCP.     #Neuro - no active issue    #CV   Septic shock:   - BP stable off pressors  - elevated troponins likely 2/2 demand ischemia, now downtrending  - will obtain EKG after ERCP  - Afib overnight, will continue to monitor, on home dose of metoprolol 12.5    HTN:  - holding home antihypertensive meds in the setting of sepsis    #Pulm   - saturating well on 2L NC  - wheezes on exam, s/p duonebs, 20IV lasix  - now euvolemic on exam    #GI  Gallstone pancreatitis  - on clear liquid diet, advance to solids tomorrow if patient tolerates   - discontinued LR due to possible volume overload, given lasix 20IV  - Repeat ERCP within 3 months for stent/stone clearance  - Followup in GI office within 4 weeks. Call [phone] for appointment    #Renal - No active issue    #Endo - No active issue     #ID  Gallstone pancreatitis   - 11/23 BCX positive for Klebsiella, repeat pending  - discontinued Zosyn, switched to Ceftriaxone and Flagyll   - will continue IV abx for 2 more days, then can transition to po  - if she starts to deteriorate clinically, will start meropenem  - trend CBC    #Heme - No active issue    #GOC  patient identifies son as surrogate decision maker  FULL CODE

## 2018-11-26 NOTE — PHYSICAL THERAPY INITIAL EVALUATION ADULT - ADDITIONAL COMMENTS
Pt lives with daughter, son-in-law, and 16yo grandson in private home with no stairs to enter, first floor setup within. Pt owns cane, walker.

## 2018-11-26 NOTE — PROGRESS NOTE ADULT - ASSESSMENT
Impression:  89 year old female with acute cholangitis/gallstone pancreatitis now s/p ERCP    Recommendations   - Return patient to ICU for ongoing care.   - Clear liquid diet today, may advance to solid food tomorrow if doing well.   - Continue IV fluids.   - Continue IV antibiotics.   - Follow CBC/LFTs. No need to check amylase and lipase unless patient develops significant abdominal pain after ERCP.   - Followup in GI office within 4 weeks. Call [phone] for appointment.   - Repeat ERCP within 3 months for stent/stone clearance.    Minoo Ventura, PGY-4  Gastroenterology Fellow  Pager x [phone] or [phone]  (After 5 pm or on weekends please page GI on call)

## 2018-11-26 NOTE — PHARMACOTHERAPY INTERVENTION NOTE - COMMENTS
Blood culture grew Kleb pneumoniae. Recommended to de-escalate from zosyn to ceftriaxone and metronidazole.

## 2018-11-26 NOTE — PROGRESS NOTE ADULT - ATTENDING COMMENTS
Klebsiella septic shock from choeldocholithaisis now s/w sphincerotomy doing well and off pressors. noted to have a short episode of afib with RVR overnight    1. Septic Shock due to Klebsiella bacteremia  - de-escalate to CFtx (based on sensitivities) and empiric anaerobe coverage with flagyl     2. Choledocholithiasis  - GI followup for ERCP  - Advance diet as per GI recs post procedure    3. Cardiology  -tele monitoring for recurrence of afib    Critical Care Time 30 minutes Klebsiella septic shock from choeldocholithaisis now s/w sphincerotomy doing well and off pressors. noted to have a short episode of afib with RVR overnight    1. Septic Shock due to Klebsiella bacteremia  - de-escalate to CFtx (based on sensitivities) and empiric anaerobe coverage with flagyl     2. Choledocholithiasis  - GI followup for ERCP  - Advance diet as per GI recs post procedure    3. Cardiology  -tele monitoring for recurrence of afib - restarted home beta blocker    Critical Care Time 30 minutes

## 2018-11-26 NOTE — PROGRESS NOTE ADULT - SUBJECTIVE AND OBJECTIVE BOX
CHIEF COMPLAINT:    Interval Events:    REVIEW OF SYSTEMS:  Constitutional: [ ] negative [ ] fevers [ ] chills [ ] weight loss [ ] weight gain  HEENT: [ ] negative [ ] dry eyes [ ] eye irritation [ ] postnasal drip [ ] nasal congestion  CV: [ ] negative  [ ] chest pain [ ] orthopnea [ ] palpitations [ ] murmur  Resp: [ ] negative [ ] cough [ ] shortness of breath [ ] dyspnea [ ] wheezing [ ] sputum [ ] hemoptysis  GI: [ ] negative [ ] nausea [ ] vomiting [ ] diarrhea [ ] constipation [ ] abd pain [ ] dysphagia   : [ ] negative [ ] dysuria [ ] nocturia [ ] hematuria [ ] increased urinary frequency  Musculoskeletal: [ ] negative [ ] back pain [ ] myalgias [ ] arthralgias [ ] fracture  Skin: [ ] negative [ ] rash [ ] itch  Neurological: [ ] negative [ ] headache [ ] dizziness [ ] syncope [ ] weakness [ ] numbness  Psychiatric: [ ] negative [ ] anxiety [ ] depression  Endocrine: [ ] negative [ ] diabetes [ ] thyroid problem  Hematologic/Lymphatic: [ ] negative [ ] anemia [ ] bleeding problem  Allergic/Immunologic: [ ] negative [ ] itchy eyes [ ] nasal discharge [ ] hives [ ] angioedema  [ ] All other systems negative  [ ] Unable to assess ROS because ________    OBJECTIVE:  ICU Vital Signs Last 24 Hrs  T(C): 36.9 (26 Nov 2018 08:00), Max: 37.2 (26 Nov 2018 00:00)  T(F): 98.5 (26 Nov 2018 08:00), Max: 98.9 (26 Nov 2018 00:00)  HR: 90 (26 Nov 2018 08:00) (73 - 99)  BP: 119/56 (26 Nov 2018 08:00) (110/57 - 173/79)  BP(mean): 81 (26 Nov 2018 08:00) (78 - 113)  ABP: --  ABP(mean): --  RR: 24 (26 Nov 2018 08:00) (19 - 32)  SpO2: 94% (26 Nov 2018 08:00) (91% - 100%)        11-25 @ 07:01  -  11-26 @ 07:00  --------------------------------------------------------  IN: 1775 mL / OUT: 102 mL / NET: 1673 mL      CAPILLARY BLOOD GLUCOSE      POCT Blood Glucose.: 105 mg/dL (26 Nov 2018 05:43)      PHYSICAL EXAM:  General:   HEENT:   Lymph Nodes:  Neck:   Respiratory:   Cardiovascular:   Abdomen:   Extremities:   Skin:   Neurological:  Psychiatry:    LINES:    HOSPITAL MEDICATIONS:  Standing Meds:  heparin  Injectable 5000 Unit(s) SubCutaneous every 8 hours  metoprolol tartrate 12.5 milliGRAM(s) Oral two times a day  piperacillin/tazobactam IVPB. 3.375 Gram(s) IV Intermittent every 8 hours      PRN Meds:  acetaminophen   Tablet .. 650 milliGRAM(s) Oral every 6 hours PRN      LABS:                        11.1   20.3  )-----------( 141      ( 26 Nov 2018 00:43 )             31.7     Hgb Trend: 11.1<--, 10.7<--, 10.5<--, 11.3<--, 13.5<--  11-26    138  |  106  |  12  ----------------------------<  68<L>  3.5   |  21<L>  |  0.88    Ca    8.3<L>      26 Nov 2018 00:43  Phos  2.2     11-26  Mg     1.9     11-26    TPro  5.6<L>  /  Alb  2.7<L>  /  TBili  0.5  /  DBili  x   /  AST  75<H>  /  ALT  143<H>  /  AlkPhos  143<H>  11-26    Creatinine Trend: 0.88<--, 1.04<--, 1.06<--, 1.07<--, 1.14<--, 1.28<--  PT/INR - ( 26 Nov 2018 00:43 )   PT: 14.6 sec;   INR: 1.27 ratio         PTT - ( 26 Nov 2018 00:43 )  PTT:21.6 sec    Arterial Blood Gas:  11-26 @ 04:35  7.45/32/168/22/98/-1.0  ABG lactate: --        MICROBIOLOGY:     Culture - Blood (collected 25 Nov 2018 06:23)  Source: .Blood Blood-Peripheral  Preliminary Report (26 Nov 2018 07:01):    No growth to date.    Culture - Blood (collected 25 Nov 2018 06:23)  Source: .Blood Blood-Peripheral  Preliminary Report (26 Nov 2018 07:01):    No growth to date.    Culture - Urine (collected 24 Nov 2018 00:51)  Source: .Urine None  Final Report (25 Nov 2018 12:26):    No growth    Culture - Blood (collected 23 Nov 2018 23:22)  Source: .Blood Blood  Gram Stain (24 Nov 2018 16:22):    Growth in anaerobic bottle: Gram Negative Rods    Growth in aerobic bottle: Gram Negative Rods  Preliminary Report (25 Nov 2018 11:33):    Growth in aerobic and anaerobic bottles: Klebsiella pneumoniae    "Due to technical problems, Proteus sp. will Not be reported as part of    the BCID panel until further notice"    ***Blood Panel PCR results on this specimen are available    approximately 3 hours after the Gram stain result.***    Gram stain, PCR, and/or culture results may not always    correspond due to difference in methodologies.    ************************************************************    This PCR assay was performed using [email or web address].    The following targets are tested for: Enterococcus,    vancomycin resistant enterococci, Listeria monocytogenes,    coagulase negative staphylococci, S. aureus,    methicillin resistant S. aureus, Streptococcus agalactiae    (Group B), S. pneumoniae, S. pyogenes (Group A),    Acinetobacter baumannii, Enterobacter cloacae, E. coli,    Klebsiella oxytoca, K. pneumoniae, Proteus sp.,    Serratia marcescens, Haemophilus influenzae,    Neisseria meningitidis, Pseudomonas aeruginosa, Candida    albicans, C. glabrata, C krusei, C parapsilosis,    C. tropicalis and the KPC resistance gene.  Organism: Blood Culture PCR (24 Nov 2018 17:45)  Organism: Blood Culture PCR (24 Nov 2018 17:45)    Culture - Blood (collected 23 Nov 2018 23:22)  Source: .Blood Blood  Gram Stain (24 Nov 2018 18:09):    Growth in anaerobic bottle: Gram Negative Rods    Growth in aerobic bottle: Gram Negative Rods  Preliminary Report (25 Nov 2018 11:35):    Growth in aerobic and anaerobic bottles: Klebsiella pneumoniae    See previous culture [id number]      RADIOLOGY:  [ ] Reviewed and interpreted by me    EKG: Interval Events: Overnight patient had an episode of trembling. Fingerstick showed glucose in the 60s. Patient given D50 which increased to 200s. Patient also had some wheezing, given duonebs and 20 lasix IVP. This morning patient doing well. Denies any chest pain, shortness of breath, or abdominal pain. Patient urinating okay, last bowel movement yesterday.     REVIEW OF SYSTEMS:  Constitutional: [ ] negative [ ] fevers [ ] chills [ ] weight loss [ ] weight gain  HEENT: [ ] negative [ ] dry eyes [ ] eye irritation [ ] postnasal drip [ ] nasal congestion  CV: [ ] negative  [ ] chest pain [ ] orthopnea [ ] palpitations [ ] murmur  Resp: [ ] negative [ ] cough [ ] shortness of breath [ ] dyspnea [ ] wheezing [ ] sputum [ ] hemoptysis  GI: [ ] negative [ ] nausea [ ] vomiting [ ] diarrhea [ ] constipation [ ] abd pain [ ] dysphagia   : [ ] negative [ ] dysuria [ ] nocturia [ ] hematuria [ ] increased urinary frequency  Musculoskeletal: [ ] negative [ ] back pain [ ] myalgias [ ] arthralgias [ ] fracture  Skin: [ ] negative [ ] rash [ ] itch  Neurological: [ ] negative [ ] headache [ ] dizziness [ ] syncope [ ] weakness [ ] numbness  Psychiatric: [ ] negative [ ] anxiety [ ] depression  Endocrine: [ ] negative [ ] diabetes [ ] thyroid problem  Hematologic/Lymphatic: [ ] negative [ ] anemia [ ] bleeding problem  Allergic/Immunologic: [ ] negative [ ] itchy eyes [ ] nasal discharge [ ] hives [ ] angioedema  [ ] All other systems negative  [ ] Unable to assess ROS because ________    OBJECTIVE:  ICU Vital Signs Last 24 Hrs  T(C): 36.9 (26 Nov 2018 08:00), Max: 37.2 (26 Nov 2018 00:00)  T(F): 98.5 (26 Nov 2018 08:00), Max: 98.9 (26 Nov 2018 00:00)  HR: 90 (26 Nov 2018 08:00) (73 - 99)  BP: 119/56 (26 Nov 2018 08:00) (110/57 - 173/79)  BP(mean): 81 (26 Nov 2018 08:00) (78 - 113)  ABP: --  ABP(mean): --  RR: 24 (26 Nov 2018 08:00) (19 - 32)  SpO2: 94% (26 Nov 2018 08:00) (91% - 100%)        11-25 @ 07:01  -  11-26 @ 07:00  --------------------------------------------------------  IN: 1775 mL / OUT: 102 mL / NET: 1673 mL      CAPILLARY BLOOD GLUCOSE      POCT Blood Glucose.: 105 mg/dL (26 Nov 2018 05:43)      PHYSICAL EXAM:  General: NAD, on 2L NC  HEENT: atraumatic, normocephalic  Neck: supple, No JVD  Respiratory: b/l expiratory wheezes  Cardiovascular: +holosystolic murmur. No rubs or gallops. S1, S2 heard, irregular beats   Abdomen: soft, nontender, nondistended, bowel sounds present   Extremities: no edema, no clubbing or cyanosis, 2+ pulses throughout  Skin: no rashes or lesions  Neurological: AOX3, no focal deficits      HOSPITAL MEDICATIONS:  Standing Meds:  heparin  Injectable 5000 Unit(s) SubCutaneous every 8 hours  metoprolol tartrate 12.5 milliGRAM(s) Oral two times a day  piperacillin/tazobactam IVPB. 3.375 Gram(s) IV Intermittent every 8 hours      PRN Meds:  acetaminophen   Tablet .. 650 milliGRAM(s) Oral every 6 hours PRN      LABS:                        11.1   20.3  )-----------( 141      ( 26 Nov 2018 00:43 )             31.7     Hgb Trend: 11.1<--, 10.7<--, 10.5<--, 11.3<--, 13.5<--  11-26    138  |  106  |  12  ----------------------------<  68<L>  3.5   |  21<L>  |  0.88    Ca    8.3<L>      26 Nov 2018 00:43  Phos  2.2     11-26  Mg     1.9     11-26    TPro  5.6<L>  /  Alb  2.7<L>  /  TBili  0.5  /  DBili  x   /  AST  75<H>  /  ALT  143<H>  /  AlkPhos  143<H>  11-26    Creatinine Trend: 0.88<--, 1.04<--, 1.06<--, 1.07<--, 1.14<--, 1.28<--  PT/INR - ( 26 Nov 2018 00:43 )   PT: 14.6 sec;   INR: 1.27 ratio         PTT - ( 26 Nov 2018 00:43 )  PTT:21.6 sec    Arterial Blood Gas:  11-26 @ 04:35  7.45/32/168/22/98/-1.0  ABG lactate: --        MICROBIOLOGY:     Culture - Blood (collected 25 Nov 2018 06:23)  Source: .Blood Blood-Peripheral  Preliminary Report (26 Nov 2018 07:01):    No growth to date.    Culture - Blood (collected 25 Nov 2018 06:23)  Source: .Blood Blood-Peripheral  Preliminary Report (26 Nov 2018 07:01):    No growth to date.    Culture - Urine (collected 24 Nov 2018 00:51)  Source: .Urine None  Final Report (25 Nov 2018 12:26):    No growth    Culture - Blood (collected 23 Nov 2018 23:22)  Source: .Blood Blood  Gram Stain (24 Nov 2018 16:22):    Growth in anaerobic bottle: Gram Negative Rods    Growth in aerobic bottle: Gram Negative Rods  Preliminary Report (25 Nov 2018 11:33):    Growth in aerobic and anaerobic bottles: Klebsiella pneumoniae    "Due to technical problems, Proteus sp. will Not be reported as part of    the BCID panel until further notice"    ***Blood Panel PCR results on this specimen are available    approximately 3 hours after the Gram stain result.***    Gram stain, PCR, and/or culture results may not always    correspond due to difference in methodologies.    ************************************************************    This PCR assay was performed using [employer].    The following targets are tested for: Enterococcus,    vancomycin resistant enterococci, Listeria monocytogenes,    coagulase negative staphylococci, S. aureus,    methicillin resistant S. aureus, Streptococcus agalactiae    (Group B), S. pneumoniae, S. pyogenes (Group A),    Acinetobacter baumannii, Enterobacter cloacae, E. coli,    Klebsiella oxytoca, K. pneumoniae, Proteus sp.,    Serratia marcescens, Haemophilus influenzae,    Neisseria meningitidis, Pseudomonas aeruginosa, Candida    albicans, C. glabrata, C krusei, C parapsilosis,    C. tropicalis and the KPC resistance gene.  Organism: Blood Culture PCR (24 Nov 2018 17:45)  Organism: Blood Culture PCR (24 Nov 2018 17:45)    Culture - Blood (collected 23 Nov 2018 23:22)  Source: .Blood Blood  Gram Stain (24 Nov 2018 18:09):    Growth in anaerobic bottle: Gram Negative Rods    Growth in aerobic bottle: Gram Negative Rods  Preliminary Report (25 Nov 2018 11:35):    Growth in aerobic and anaerobic bottles: Klebsiella pneumoniae    See previous culture [id number]      RADIOLOGY:  [ ] Reviewed and interpreted by me    EKG:

## 2018-11-27 DIAGNOSIS — I48.0 PAROXYSMAL ATRIAL FIBRILLATION: ICD-10-CM

## 2018-11-27 DIAGNOSIS — F32.9 MAJOR DEPRESSIVE DISORDER, SINGLE EPISODE, UNSPECIFIED: ICD-10-CM

## 2018-11-27 DIAGNOSIS — K85.10 BILIARY ACUTE PANCREATITIS WITHOUT NECROSIS OR INFECTION: ICD-10-CM

## 2018-11-27 DIAGNOSIS — Z29.9 ENCOUNTER FOR PROPHYLACTIC MEASURES, UNSPECIFIED: ICD-10-CM

## 2018-11-27 DIAGNOSIS — R78.81 BACTEREMIA: ICD-10-CM

## 2018-11-27 DIAGNOSIS — A41.4 SEPSIS DUE TO ANAEROBES: ICD-10-CM

## 2018-11-27 DIAGNOSIS — I10 ESSENTIAL (PRIMARY) HYPERTENSION: ICD-10-CM

## 2018-11-27 DIAGNOSIS — K80.33 CALCULUS OF BILE DUCT WITH ACUTE CHOLANGITIS WITH OBSTRUCTION: ICD-10-CM

## 2018-11-27 LAB
ALBUMIN SERPL ELPH-MCNC: 2.9 G/DL — LOW (ref 3.3–5)
ALBUMIN SERPL ELPH-MCNC: 3 G/DL — LOW (ref 3.3–5)
ALP SERPL-CCNC: 126 U/L — HIGH (ref 40–120)
ALP SERPL-CCNC: 133 U/L — HIGH (ref 40–120)
ALT FLD-CCNC: 84 U/L — HIGH (ref 10–45)
ALT FLD-CCNC: 96 U/L — HIGH (ref 10–45)
ANION GAP SERPL CALC-SCNC: 13 MMOL/L — SIGNIFICANT CHANGE UP (ref 5–17)
ANION GAP SERPL CALC-SCNC: 14 MMOL/L — SIGNIFICANT CHANGE UP (ref 5–17)
APTT BLD: 30 SEC — SIGNIFICANT CHANGE UP (ref 27.5–36.3)
AST SERPL-CCNC: 26 U/L — SIGNIFICANT CHANGE UP (ref 10–40)
AST SERPL-CCNC: 32 U/L — SIGNIFICANT CHANGE UP (ref 10–40)
BASOPHILS # BLD AUTO: 0 K/UL — SIGNIFICANT CHANGE UP (ref 0–0.2)
BASOPHILS NFR BLD AUTO: 0 % — SIGNIFICANT CHANGE UP (ref 0–2)
BILIRUB SERPL-MCNC: 0.3 MG/DL — SIGNIFICANT CHANGE UP (ref 0.2–1.2)
BILIRUB SERPL-MCNC: 0.3 MG/DL — SIGNIFICANT CHANGE UP (ref 0.2–1.2)
BUN SERPL-MCNC: 7 MG/DL — SIGNIFICANT CHANGE UP (ref 7–23)
BUN SERPL-MCNC: 8 MG/DL — SIGNIFICANT CHANGE UP (ref 7–23)
CALCIUM SERPL-MCNC: 8.2 MG/DL — LOW (ref 8.4–10.5)
CALCIUM SERPL-MCNC: 8.4 MG/DL — SIGNIFICANT CHANGE UP (ref 8.4–10.5)
CHLORIDE SERPL-SCNC: 101 MMOL/L — SIGNIFICANT CHANGE UP (ref 96–108)
CHLORIDE SERPL-SCNC: 103 MMOL/L — SIGNIFICANT CHANGE UP (ref 96–108)
CO2 SERPL-SCNC: 21 MMOL/L — LOW (ref 22–31)
CO2 SERPL-SCNC: 21 MMOL/L — LOW (ref 22–31)
CREAT SERPL-MCNC: 0.75 MG/DL — SIGNIFICANT CHANGE UP (ref 0.5–1.3)
CREAT SERPL-MCNC: 0.79 MG/DL — SIGNIFICANT CHANGE UP (ref 0.5–1.3)
EOSINOPHIL # BLD AUTO: 0.2 K/UL — SIGNIFICANT CHANGE UP (ref 0–0.5)
EOSINOPHIL NFR BLD AUTO: 1.4 % — SIGNIFICANT CHANGE UP (ref 0–6)
GLUCOSE BLDC GLUCOMTR-MCNC: 90 MG/DL — SIGNIFICANT CHANGE UP (ref 70–99)
GLUCOSE SERPL-MCNC: 82 MG/DL — SIGNIFICANT CHANGE UP (ref 70–99)
GLUCOSE SERPL-MCNC: 83 MG/DL — SIGNIFICANT CHANGE UP (ref 70–99)
GRAM STN FLD: SIGNIFICANT CHANGE UP
HCT VFR BLD CALC: 31.7 % — LOW (ref 34.5–45)
HCT VFR BLD CALC: 32.6 % — LOW (ref 34.5–45)
HGB BLD-MCNC: 10.8 G/DL — LOW (ref 11.5–15.5)
HGB BLD-MCNC: 11.3 G/DL — LOW (ref 11.5–15.5)
INR BLD: 1.13 RATIO — SIGNIFICANT CHANGE UP (ref 0.88–1.16)
INR BLD: 1.2 RATIO — HIGH (ref 0.88–1.16)
LYMPHOCYTES # BLD AUTO: 0.9 K/UL — LOW (ref 1–3.3)
LYMPHOCYTES # BLD AUTO: 5.4 % — LOW (ref 13–44)
MAGNESIUM SERPL-MCNC: 1.9 MG/DL — SIGNIFICANT CHANGE UP (ref 1.6–2.6)
MAGNESIUM SERPL-MCNC: 2.1 MG/DL — SIGNIFICANT CHANGE UP (ref 1.6–2.6)
MCHC RBC-ENTMCNC: 30.6 PG — SIGNIFICANT CHANGE UP (ref 27–34)
MCHC RBC-ENTMCNC: 31.1 PG — SIGNIFICANT CHANGE UP (ref 27–34)
MCHC RBC-ENTMCNC: 34.2 GM/DL — SIGNIFICANT CHANGE UP (ref 32–36)
MCHC RBC-ENTMCNC: 34.7 GM/DL — SIGNIFICANT CHANGE UP (ref 32–36)
MCV RBC AUTO: 89.4 FL — SIGNIFICANT CHANGE UP (ref 80–100)
MCV RBC AUTO: 89.6 FL — SIGNIFICANT CHANGE UP (ref 80–100)
MONOCYTES # BLD AUTO: 0.8 K/UL — SIGNIFICANT CHANGE UP (ref 0–0.9)
MONOCYTES NFR BLD AUTO: 4.8 % — SIGNIFICANT CHANGE UP (ref 2–14)
NEUTROPHILS # BLD AUTO: 14.2 K/UL — HIGH (ref 1.8–7.4)
NEUTROPHILS NFR BLD AUTO: 88.3 % — HIGH (ref 43–77)
PHOSPHATE SERPL-MCNC: 2.1 MG/DL — LOW (ref 2.5–4.5)
PHOSPHATE SERPL-MCNC: 3.7 MG/DL — SIGNIFICANT CHANGE UP (ref 2.5–4.5)
PLATELET # BLD AUTO: 158 K/UL — SIGNIFICANT CHANGE UP (ref 150–400)
PLATELET # BLD AUTO: 160 K/UL — SIGNIFICANT CHANGE UP (ref 150–400)
POTASSIUM SERPL-MCNC: 3.5 MMOL/L — SIGNIFICANT CHANGE UP (ref 3.5–5.3)
POTASSIUM SERPL-MCNC: 3.7 MMOL/L — SIGNIFICANT CHANGE UP (ref 3.5–5.3)
POTASSIUM SERPL-SCNC: 3.5 MMOL/L — SIGNIFICANT CHANGE UP (ref 3.5–5.3)
POTASSIUM SERPL-SCNC: 3.7 MMOL/L — SIGNIFICANT CHANGE UP (ref 3.5–5.3)
PROT SERPL-MCNC: 5.8 G/DL — LOW (ref 6–8.3)
PROT SERPL-MCNC: 5.9 G/DL — LOW (ref 6–8.3)
PROTHROM AB SERPL-ACNC: 13 SEC — HIGH (ref 10–12.9)
PROTHROM AB SERPL-ACNC: 13.9 SEC — HIGH (ref 10–12.9)
RBC # BLD: 3.54 M/UL — LOW (ref 3.8–5.2)
RBC # BLD: 3.64 M/UL — LOW (ref 3.8–5.2)
RBC # FLD: 12.1 % — SIGNIFICANT CHANGE UP (ref 10.3–14.5)
RBC # FLD: 12.2 % — SIGNIFICANT CHANGE UP (ref 10.3–14.5)
SODIUM SERPL-SCNC: 136 MMOL/L — SIGNIFICANT CHANGE UP (ref 135–145)
SODIUM SERPL-SCNC: 137 MMOL/L — SIGNIFICANT CHANGE UP (ref 135–145)
WBC # BLD: 13.4 K/UL — HIGH (ref 3.8–10.5)
WBC # BLD: 16.1 K/UL — HIGH (ref 3.8–10.5)
WBC # FLD AUTO: 13.4 K/UL — HIGH (ref 3.8–10.5)
WBC # FLD AUTO: 16.1 K/UL — HIGH (ref 3.8–10.5)

## 2018-11-27 PROCEDURE — 99222 1ST HOSP IP/OBS MODERATE 55: CPT

## 2018-11-27 PROCEDURE — 99223 1ST HOSP IP/OBS HIGH 75: CPT | Mod: AI

## 2018-11-27 PROCEDURE — 99231 SBSQ HOSP IP/OBS SF/LOW 25: CPT | Mod: GC

## 2018-11-27 RX ORDER — AMLODIPINE BESYLATE 2.5 MG/1
5 TABLET ORAL DAILY
Qty: 0 | Refills: 0 | Status: DISCONTINUED | OUTPATIENT
Start: 2018-11-27 | End: 2018-11-29

## 2018-11-27 RX ORDER — MIRTAZAPINE 45 MG/1
45 TABLET, ORALLY DISINTEGRATING ORAL AT BEDTIME
Qty: 0 | Refills: 0 | Status: DISCONTINUED | OUTPATIENT
Start: 2018-11-27 | End: 2018-11-29

## 2018-11-27 RX ORDER — ATORVASTATIN CALCIUM 80 MG/1
10 TABLET, FILM COATED ORAL AT BEDTIME
Qty: 0 | Refills: 0 | Status: DISCONTINUED | OUTPATIENT
Start: 2018-11-27 | End: 2018-11-29

## 2018-11-27 RX ORDER — AMLODIPINE BESYLATE 2.5 MG/1
5 TABLET ORAL DAILY
Qty: 0 | Refills: 0 | Status: DISCONTINUED | OUTPATIENT
Start: 2018-11-27 | End: 2018-11-27

## 2018-11-27 RX ORDER — HEPARIN SODIUM 5000 [USP'U]/ML
5000 INJECTION INTRAVENOUS; SUBCUTANEOUS EVERY 12 HOURS
Qty: 0 | Refills: 0 | Status: DISCONTINUED | OUTPATIENT
Start: 2018-11-27 | End: 2018-11-29

## 2018-11-27 RX ORDER — METOPROLOL TARTRATE 50 MG
12.5 TABLET ORAL
Qty: 0 | Refills: 0 | Status: DISCONTINUED | OUTPATIENT
Start: 2018-11-27 | End: 2018-11-29

## 2018-11-27 RX ORDER — ARIPIPRAZOLE 15 MG/1
5 TABLET ORAL DAILY
Qty: 0 | Refills: 0 | Status: DISCONTINUED | OUTPATIENT
Start: 2018-11-27 | End: 2018-11-29

## 2018-11-27 RX ORDER — POTASSIUM PHOSPHATE, MONOBASIC POTASSIUM PHOSPHATE, DIBASIC 236; 224 MG/ML; MG/ML
15 INJECTION, SOLUTION INTRAVENOUS ONCE
Qty: 0 | Refills: 0 | Status: COMPLETED | OUTPATIENT
Start: 2018-11-27 | End: 2018-11-27

## 2018-11-27 RX ORDER — NORTRIPTYLINE HYDROCHLORIDE 10 MG/1
25 CAPSULE ORAL DAILY
Qty: 0 | Refills: 0 | Status: DISCONTINUED | OUTPATIENT
Start: 2018-11-27 | End: 2018-11-29

## 2018-11-27 RX ADMIN — HEPARIN SODIUM 5000 UNIT(S): 5000 INJECTION INTRAVENOUS; SUBCUTANEOUS at 05:10

## 2018-11-27 RX ADMIN — Medication 12.5 MILLIGRAM(S): at 17:07

## 2018-11-27 RX ADMIN — MIRTAZAPINE 45 MILLIGRAM(S): 45 TABLET, ORALLY DISINTEGRATING ORAL at 20:38

## 2018-11-27 RX ADMIN — Medication 12.5 MILLIGRAM(S): at 05:10

## 2018-11-27 RX ADMIN — POTASSIUM PHOSPHATE, MONOBASIC POTASSIUM PHOSPHATE, DIBASIC 62.5 MILLIMOLE(S): 236; 224 INJECTION, SOLUTION INTRAVENOUS at 02:33

## 2018-11-27 RX ADMIN — NORTRIPTYLINE HYDROCHLORIDE 25 MILLIGRAM(S): 10 CAPSULE ORAL at 20:38

## 2018-11-27 RX ADMIN — ATORVASTATIN CALCIUM 10 MILLIGRAM(S): 80 TABLET, FILM COATED ORAL at 20:38

## 2018-11-27 RX ADMIN — HEPARIN SODIUM 5000 UNIT(S): 5000 INJECTION INTRAVENOUS; SUBCUTANEOUS at 17:08

## 2018-11-27 RX ADMIN — CEFTRIAXONE 100 GRAM(S): 500 INJECTION, POWDER, FOR SOLUTION INTRAMUSCULAR; INTRAVENOUS at 12:05

## 2018-11-27 RX ADMIN — ARIPIPRAZOLE 5 MILLIGRAM(S): 15 TABLET ORAL at 20:38

## 2018-11-27 RX ADMIN — AMLODIPINE BESYLATE 5 MILLIGRAM(S): 2.5 TABLET ORAL at 05:10

## 2018-11-27 RX ADMIN — Medication 100 MILLIGRAM(S): at 07:01

## 2018-11-27 NOTE — PROGRESS NOTE ADULT - SUBJECTIVE AND OBJECTIVE BOX
MICU transfer acceptance note:     "HPI:  89 y.o female with known hx of depression and HTN, otherwise functional at baseline presents as a transfer from [place]. Presented to [place] ED for actue onset of fever, chill, abdominal pain with episodes of nausea/vomiting. The pain, per daughter was located in the RUQ and epigastric region. Denies SOB, CP, no urinary symptoms.  In [place] ED, she was Febrile to 101.4, and tachycardic to 120, tachypneic to 23. Lactate was 4.6 -> 2.8, and lipase was 66493 & AST/ALT of 930/379, s/p 2.6L IVF. CTA showed gallstone pancreatitis with moderate intrahepatic, extrahepatic, and pancreatic ductal dilatation. Questionable Cholangitis and cholecystits. Abd US showed significant pericholecystic fluid and mulitple stones suggesting possible cholecystitis.    At the time of my examination, patient denies abd pain, no nausea and no vomiting. She reports to be feeling a little warm, likely from the ambulance ride, but denies other symptoms, including CP, SOB, dysuria/hematuria, skin changes"    Hospital MICU course:  Admitted to MICU for septic shock secondary to to acute cholangitis/gallstone pancreatitis from choledocholithiases now s/p ERCP on 10/25 with stent placement. Septic shock and transaminitis improving. Blood revealed klebsiella bacteremia. Had pAfib in setting of shock. Patient stable and downgraded to floors.       Subjective: No overnight events. Feels well, wants to go home by susi. Denies any f/c/n/v, abd pain, sob, cp.     Home Medications:  acetaminophen 325 mg oral tablet: 2 tab(s) orally every 6 hours, As needed, Mild Pain (1 - 3) (08 Feb 2018 11:44)  amLODIPine 5 mg oral tablet: 1 tab(s) orally once a day (08 Feb 2018 11:44)  ARIPiprazole 5 mg oral tablet: 1 tab(s) orally once a day (08 Feb 2018 11:44)  aspirin 81 mg oral tablet, chewable: 1 tab(s) orally once a day (08 Feb 2018 11:44)  atorvastatin 10 mg oral tablet: 1 tab(s) orally once a day (at bedtime) (08 Feb 2018 11:44)  busPIRone 30 mg oral tablet: 1 tab(s) orally once a day (08 Feb 2018 11:44)  cholecalciferol 1000 intl units oral tablet: 1 tab(s) orally once a day (08 Feb 2018 11:44)  desvenlafaxine 100 mg oral tablet, extended release: 1 tab(s) orally once a day (08 Feb 2018 11:44)  heparin: 5000 unit(s) subcutaneous every 8 hours (08 Feb 2018 11:44)  Metoprolol Tartrate 25 mg oral tablet: 0.5 tab(s) orally 2 times a day (08 Feb 2018 11:46)  mirtazapine 45 mg oral tablet: 1 tab(s) orally once a day (at bedtime) (08 Feb 2018 11:44)  nortriptyline 50 mg oral capsule: 1 cap(s) orally once a day (at bedtime) (08 Feb 2018 11:44)  traMADol 50 mg oral tablet: 1 tab(s) orally 4 times a day, As needed, Moderate Pain (4 - 6) (08 Feb 2018 11:44)      PAST MEDICAL & SURGICAL HISTORY:  Depression  HTN (hypertension)  No significant past surgical history      Review of Systems:   CONSTITUTIONAL: No fever, weight loss, or fatigue  EYES: No eye pain, visual disturbances, or discharge  ENMT:  No difficulty hearing, tinnitus, vertigo; No sinus or throat pain  NECK: No pain or stiffness  RESPIRATORY: No cough, wheezing, chills or hemoptysis; No shortness of breath  CARDIOVASCULAR: No chest pain, palpitations, dizziness, or leg swelling  GASTROINTESTINAL: No abdominal or epigastric pain. No nausea, vomiting, or hematemesis; No diarrhea or constipation. No melena or hematochezia.  GENITOURINARY: No dysuria, frequency, hematuria, or incontinence  NEUROLOGICAL: No headaches, memory loss, loss of strength, numbness, or tremors  SKIN: No itching, burning, rashes, or lesions   ENDOCRINE: No heat or cold intolerance; No hair loss  MUSCULOSKELETAL: No joint pain or swelling; No muscle, back, or extremity pain      Allergies    codeine (Unknown)    Intolerances        Social History: denies toxic habits  x3    FAMILY HISTORY:  No pertinent family history in first degree relatives   Noncontributory    MEDICATIONS  (STANDING):  amLODIPine   Tablet 5 milliGRAM(s) Oral daily  cefTRIAXone   IVPB 1 Gram(s) IV Intermittent every 24 hours  heparin  Injectable 5000 Unit(s) SubCutaneous every 8 hours  metoprolol tartrate 12.5 milliGRAM(s) Oral two times a day  metroNIDAZOLE  IVPB 500 milliGRAM(s) IV Intermittent every 8 hours    MEDICATIONS  (PRN):  acetaminophen   Tablet .. 650 milliGRAM(s) Oral every 6 hours PRN Temp greater or equal to 38C (100.4F)      Vital Signs Last 24 Hrs  T(C): 36.7 (27 Nov 2018 11:46), Max: 37.8 (26 Nov 2018 20:00)  T(F): 98.1 (27 Nov 2018 11:46), Max: 100 (26 Nov 2018 20:00)  HR: 94 (27 Nov 2018 11:46) (80 - 111)  BP: 148/75 (27 Nov 2018 11:46) (141/67 - 179/92)  BP(mean): 109 (27 Nov 2018 03:05) (96 - 127)  RR: 18 (27 Nov 2018 11:46) (18 - 33)  SpO2: 94% (27 Nov 2018 11:46) (94% - 99%)  CAPILLARY BLOOD GLUCOSE      POCT Blood Glucose.: 90 mg/dL (27 Nov 2018 00:53)  POCT Blood Glucose.: 90 mg/dL (26 Nov 2018 17:32)            PHYSICAL EXAM:  GENERAL: NAD, well-developed  HEAD:  Atraumatic, Normocephalic  NECK: Supple, No JVD  CHEST/LUNG: Clear to auscultation bilaterally; No wheeze  HEART: Regular rate and rhythm; No murmurs, rubs, or gallops  ABDOMEN: Soft, Nontender, Nondistended; Bowel sounds present  EXTREMITIES:  2+ Peripheral Pulses, No clubbing, cyanosis, or edema  PSYCH: AAOx3  NEUROLOGY: non-focal  SKIN: No rashes or lesions    LABS:                        10.8   13.4  )-----------( 158      ( 27 Nov 2018 07:20 )             31.7     11-27    136  |  101  |  7   ----------------------------<  83  3.7   |  21<L>  |  0.75    Ca    8.2<L>      27 Nov 2018 07:20  Phos  3.7     11-27  Mg     1.9     11-27    TPro  5.8<L>  /  Alb  3.0<L>  /  TBili  0.3  /  DBili  x   /  AST  26  /  ALT  84<H>  /  AlkPhos  126<H>  11-27    PT/INR - ( 27 Nov 2018 07:20 )   PT: 13.9 sec;   INR: 1.20 ratio         PTT - ( 27 Nov 2018 01:10 )  PTT:30.0 sec          Culture - Blood (collected 25 Nov 2018 06:23)  Source: .Blood Blood-Peripheral  Preliminary Report (26 Nov 2018 07:01):    No growth to date.    Culture - Blood (collected 25 Nov 2018 06:23)  Source: .Blood Blood-Peripheral  Gram Stain (26 Nov 2018 12:14):    Growth in aerobic bottle: Gram Negative Rods  Preliminary Report (26 Nov 2018 12:14):    Growth in aerobic bottle: Gram Negative Rods        RADIOLOGY & ADDITIONAL TESTS:    tele reviewed: sinus 80-90s    Imaging Personally Reviewed:    Consultant(s) Notes Reviewed:  MICU, GI, ID, surgery    Care Discussed with Consultants/Other Providers: ID

## 2018-11-27 NOTE — PROGRESS NOTE ADULT - PROBLEM SELECTOR PLAN 1
septic shock now resolved, was secondary to acute cholangitis, afebrile, no leukocytosis  -downgraded from MICU  -antibiotics as below septic shock now resolved, was secondary to acute cholangitis, afebrile, no leukocytosis, HD stable  -downgraded from MICU  -antibiotics as below

## 2018-11-27 NOTE — CONSULT NOTE ADULT - ASSESSMENT
89 y.o female with known hx of depression and HTN presenting with severe sepsis diagnosed with acute cholangitis/gallstone pancreatitis, now s/p ERCP, blood cx positive for klebsiella pneumonia, currently on day 2 of ceftriaxone 1 g q12hrs and metronidazole 500mg q 8hrs. 89 y.o female with known hx of depression and HTN presenting with severe sepsis diagnosed with acute cholangitis/gallstone pancreatitis, now s/p ERCP, blood cx positive for klebsiella pneumonia, currently on day 2 of ceftriaxone 1 g q12hrs and metronidazole 500mg q 8hrs.    Plan:  -repeat blood cx today and follow up  -d/c metronidazole and ceftriaxone and transition to levofloxacin

## 2018-11-27 NOTE — PROGRESS NOTE ADULT - PROBLEM SELECTOR PLAN 5
had paroxysmal afib in setting of septic shock  -c/w home Toprol 12.5mg daily  -no a/c, per patient refusing at this time had paroxysmal afib likely precipitated by septic shock  -c/w home Toprol 12.5mg daily  -no a/c, per patient refusing at this time, aware of CVA risk.  -remains in NSR on tele

## 2018-11-27 NOTE — PROGRESS NOTE ADULT - PROBLEM SELECTOR PLAN 7
stable, restart home outpatient meds as below  -start nortriplyle 25mg daily  -start arippirzole 5mg daily  -start mirtazapine 45mg daily stable, restart home outpatient meds as below  -start nortriptyline 25mg daily  -start aripiprazole 5mg daily  -start mirtazapine 45mg daily

## 2018-11-27 NOTE — PROGRESS NOTE ADULT - SUBJECTIVE AND OBJECTIVE BOX
Chief Complaint:  Patient is a 89y old  Female who presents with a chief complaint of cholangitis (24 Nov 2018 09:52)      Interval Events: Pt feeling well and not having any abd pain. Tolerating clears    Allergies:  codeine (Unknown)      Hospital Medications:  acetaminophen   Tablet .. 650 milliGRAM(s) Oral every 6 hours PRN  amLODIPine   Tablet 5 milliGRAM(s) Oral daily  cefTRIAXone   IVPB 1 Gram(s) IV Intermittent every 24 hours  heparin  Injectable 5000 Unit(s) SubCutaneous every 8 hours  metoprolol tartrate 12.5 milliGRAM(s) Oral two times a day  metroNIDAZOLE  IVPB 500 milliGRAM(s) IV Intermittent every 8 hours      PMHX/PSHX:  Depression  HTN (hypertension)  No pertinent past medical history  No significant past surgical history      Family history:  no pertinent history in first degree relative    ROS:     General:  No fevers, chills  Eyes:  Good vision  ENT:  No odynophagia, dysphagia  CV:  No pain, palpitations  Resp:  No dyspnea, cough, tachypnea, wheezing  GI:  See HPI  Muscle:  No pain, weakness  Skin:  No rash, edema      PHYSICAL EXAM:     GENERAL:  No distress  HEENT: conjunctivae clear  CHEST:  Full & symmetric excursion, no increased effort  HEART:  Regular rhythm  ABDOMEN:  Soft, non-tender, non-distended  EXTREMITIES:  no edema  SKIN:  Dry/warm  NEURO:  Alert    Vital Signs:  Vital Signs Last 24 Hrs  T(C): 36.6 (27 Nov 2018 04:02), Max: 37.8 (26 Nov 2018 20:00)  T(F): 97.8 (27 Nov 2018 04:02), Max: 100 (26 Nov 2018 20:00)  HR: 90 (27 Nov 2018 04:02) (67 - 111)  BP: 152/71 (27 Nov 2018 04:02) (130/60 - 179/92)  BP(mean): 109 (27 Nov 2018 03:05) (89 - 127)  RR: 19 (27 Nov 2018 04:02) (19 - 34)  SpO2: 97% (27 Nov 2018 04:02) (94% - 99%)  Daily     Daily     LABS:                        10.8   13.4  )-----------( 158      ( 27 Nov 2018 07:20 )             31.7     11-27    136  |  101  |  7   ----------------------------<  83  3.7   |  21<L>  |  0.75    Ca    8.2<L>      27 Nov 2018 07:20  Phos  3.7     11-27  Mg     1.9     11-27    TPro  5.8<L>  /  Alb  3.0<L>  /  TBili  0.3  /  DBili  x   /  AST  26  /  ALT  84<H>  /  AlkPhos  126<H>  11-27    LIVER FUNCTIONS - ( 27 Nov 2018 07:20 )  Alb: 3.0 g/dL / Pro: 5.8 g/dL / ALK PHOS: 126 U/L / ALT: 84 U/L / AST: 26 U/L / GGT: x           PT/INR - ( 27 Nov 2018 07:20 )   PT: 13.9 sec;   INR: 1.20 ratio         PTT - ( 27 Nov 2018 01:10 )  PTT:30.0 sec        Imaging:

## 2018-11-27 NOTE — PROGRESS NOTE ADULT - PROBLEM SELECTOR PLAN 2
Bcx from 11/23, 11/25 growing pansensitive klebsiella PNA from cholangitis  -discussed with ID Dr Corbin, rec switched to Bcx from 11/23, 11/25 growing pansensitive klebsiella PNA from cholangitis due to choledocholithiases now s/p ERCP with stent  -discussed with ID Dr Corbin, rec switched to levaquin 500mg daily at least 14 days from negative culture  -repeat blood pending today

## 2018-11-27 NOTE — CONSULT NOTE ADULT - SUBJECTIVE AND OBJECTIVE BOX
Patient is a 89y old  Female who presents with a chief complaint of cholangitis (27 Nov 2018 09:41)      HPI:  89 y.o female with known hx of depression and HTN, otherwise functional at baseline presents as a transfer from [place] on 11/24/18 . Presented to [place] ED for actue onset of fever, chill, abdominal pain with episodes of nausea/vomiting. The pain, per daughter was located in the RUQ and epigastric region.     In [place] ED, she was Febrile to 101.4, and tachycardic to 120, tachypneic to 23. Lactate was 4.6 -> 2.8, and lipase was 36450 & AST/ALT of 930/379, s/p 2.6L IVF. CTA showed gallstone pancreatitis with moderate intrahepatic, extrahepatic, and pancreatic ductal dilatation. Questionable Cholangitis and cholecystits. Abd US showed significant pericholecystic fluid and mulitple stones suggesting possible cholecystitis.  Patient transferred to [facility] for MRCP/ERCP.  s/p MRCP on 11/24. s/p ERCP on 11/25 with 7 cm biliary stent placement. BCX was positive for klebsiella. Patient was given 2 days of zosyn (11/24-11/26).  On 11/26, patient was started on ceftriaxone 1 g q 24 hrs (day 2) and metronidazole 500mg q8hrs (day 2).    Currently,          prior hospital charts reviewed [  ]  primary team notes reviewed [  ]  other consultant notes reviewed [  ]    PAST MEDICAL & SURGICAL HISTORY:  Depression  HTN (hypertension)  No significant past surgical history      Allergies  codeine (Unknown)        ANTIMICROBIALS:  cefTRIAXone   IVPB 1 every 24 hours  metroNIDAZOLE  IVPB 500 every 8 hours      OTHER MEDS: MEDICATIONS  (STANDING):  acetaminophen   Tablet .. 650 every 6 hours PRN  amLODIPine   Tablet 5 daily  heparin  Injectable 5000 every 8 hours  metoprolol tartrate 12.5 two times a day      SOCIAL HISTORY:   hx smoking  non-smoker    FAMILY HISTORY:  No pertinent family history in first degree relatives      REVIEW OF SYSTEMS  [  ] ROS unobtainable because:    [  ] All other systems negative except as noted below:	    Constitutional:  [ ] fever [ ] chills  [ ] weight loss  [ ] weakness  Skin:  [ ] rash [ ] phlebitis	  Eyes: [ ] icterus [ ] pain  [ ] discharge	  ENMT: [ ] sore throat  [ ] thrush [ ] ulcers [ ] exudates  Respiratory: [ ] dyspnea [ ] hemoptysis [ ] cough [ ] sputum	  Cardiovascular:  [ ] chest pain [ ] palpitations [ ] edema	  Gastrointestinal:  [ ] nausea [ ] vomiting [ ] diarrhea [ ] constipation [ ] pain	  Genitourinary:  [ ] dysuria [ ] frequency [ ] hematuria [ ] discharge [ ] flank pain  [ ] incontinence  Musculoskeletal:  [ ] myalgias [ ] arthralgias [ ] arthritis  [ ] back pain  Neurological:  [ ] headache [ ] seizures  [ ] confusion/altered mental status  Psychiatric:  [ ] anxiety [ ] depression	  Hematology/Lymphatics:  [ ] lymphadenopathy  Endocrine:  [ ] adrenal [ ] thyroid  Allergic/Immunologic:	 [ ] transplant [ ] seasonal    Vital Signs Last 24 Hrs  T(F): 97.8 (11-27-18 @ 04:02), Max: 101.7 (11-23-18 @ 23:03)    Vital Signs Last 24 Hrs  HR: 90 (11-27-18 @ 04:02) (67 - 111)  BP: 152/71 (11-27-18 @ 04:02) (130/60 - 179/92)  RR: 19 (11-27-18 @ 04:02)  SpO2: 97% (11-27-18 @ 04:02) (94% - 99%)  Wt(kg): --    PHYSICAL EXAM:  General: non-toxic  HEAD/EYES: anicteric, PERRL  ENT:  supple  Cardiovascular:   S1, S2  Respiratory:  clear bilaterally  GI:  soft, non-tender, normal bowel sounds  :  no CVA tenderness   Musculoskeletal:  no synovitis  Neurologic:  grossly non-focal  Skin:  no rash  Lymph: no lymphadenopathy  Psychiatric:  appropriate affect  Vascular:  no phlebitis                                10.8   13.4  )-----------( 158      ( 27 Nov 2018 07:20 )             31.7       11-27    136  |  101  |  7   ----------------------------<  83  3.7   |  21<L>  |  0.75    Ca    8.2<L>      27 Nov 2018 07:20  Phos  3.7     11-27  Mg     1.9     11-27    TPro  5.8<L>  /  Alb  3.0<L>  /  TBili  0.3  /  DBili  x   /  AST  26  /  ALT  84<H>  /  AlkPhos  126<H>  11-27          MICROBIOLOGY:        Culture - Blood (collected 11-25-18 @ 06:23)  Source: .Blood Blood-Peripheral  Preliminary Report (11-26-18 @ 07:01):    No growth to date.    Culture - Blood (collected 11-25-18 @ 06:23)  Source: .Blood Blood-Peripheral  Gram Stain (11-26-18 @ 12:14):    Growth in aerobic bottle: Gram Negative Rods  Preliminary Report (11-26-18 @ 12:14):    Growth in aerobic bottle: Gram Negative Rods            Rapid RVP Result: NotDetec (11-24 @ 00:51)          RADIOLOGY:  imaging below personally reviewed Patient is a 89y old  Female who presents with a chief complaint of cholangitis (27 Nov 2018 09:41)      HPI:  89 y.o female with known hx of depression and HTN, otherwise functional at baseline presents as a transfer from [place] on 11/24/18 . Presented to [place] ED for actue onset of fever, chill, abdominal pain with episodes of nausea/vomiting. The pain, per daughter was located in the RUQ and epigastric region.     In [place] ED, she was Febrile to 101.4, and tachycardic to 120, tachypneic to 23. Lactate was 4.6 -> 2.8, and lipase was 30847 & AST/ALT of 930/379, s/p 2.6L IVF. CTA showed gallstone pancreatitis with moderate intrahepatic, extrahepatic, and pancreatic ductal dilatation. Questionable Cholangitis and cholecystits. Abd US showed significant pericholecystic fluid and mulitple stones suggesting possible cholecystitis.  Patient transferred to [facility] for MRCP/ERCP.  s/p MRCP on 11/24. s/p ERCP on 11/25 with 7 cm biliary stent placement. BCX was positive for klebsiella. Patient was given 2 days of zosyn (11/24-11/26).  On 11/26, patient was started on ceftriaxone 1 g q 24 hrs (day 2) and metronidazole 500mg q8hrs (day 2).    Currently, denies any fevers/chills/night sweats, sob, chest pain, nasal congestion, sore throat, abdominal pain, nausea, vomiting, dysuria, urgency, frequency, diarrhea.           PAST MEDICAL & SURGICAL HISTORY:  Depression  HTN (hypertension)  No significant past surgical history      Allergies  codeine (Unknown)        ANTIMICROBIALS:  cefTRIAXone   IVPB 1 every 24 hours  metroNIDAZOLE  IVPB 500 every 8 hours      OTHER MEDS: MEDICATIONS  (STANDING):  acetaminophen   Tablet .. 650 every 6 hours PRN  amLODIPine   Tablet 5 daily  heparin  Injectable 5000 every 8 hours  metoprolol tartrate 12.5 two times a day      SOCIAL HISTORY:   hx smoking  non-smoker    FAMILY HISTORY:  No pertinent family history in first degree relatives      REVIEW OF SYSTEMS  [  ] ROS unobtainable because:    [x  ] All other systems negative except as noted below:	    Constitutional:  [ ] fever [ ] chills  [ ] weight loss  [ ] weakness  Skin:  [ ] rash [ ] phlebitis	  Eyes: [ ] icterus [ ] pain  [ ] discharge	  ENMT: [ ] sore throat  [ ] thrush [ ] ulcers [ ] exudates  Respiratory: [ ] dyspnea [ ] hemoptysis [ ] cough [ ] sputum	  Cardiovascular:  [ ] chest pain [ ] palpitations [ ] edema	  Gastrointestinal:  [ ] nausea [ ] vomiting [ ] diarrhea [ ] constipation [ ] pain	  Genitourinary:  [ ] dysuria [ ] frequency [ ] hematuria [ ] discharge [ ] flank pain  [ ] incontinence  Musculoskeletal:  [ ] myalgias [ ] arthralgias [ ] arthritis  [ ] back pain  Neurological:  [ ] headache [ ] seizures  [ ] confusion/altered mental status  Psychiatric:  [ ] anxiety [ ] depression	  Hematology/Lymphatics:  [ ] lymphadenopathy  Endocrine:  [ ] adrenal [ ] thyroid  Allergic/Immunologic:	 [ ] transplant [ ] seasonal    Vital Signs Last 24 Hrs  T(F): 97.8 (11-27-18 @ 04:02), Max: 101.7 (11-23-18 @ 23:03)    Vital Signs Last 24 Hrs  HR: 90 (11-27-18 @ 04:02) (67 - 111)  BP: 152/71 (11-27-18 @ 04:02) (130/60 - 179/92)  RR: 19 (11-27-18 @ 04:02)  SpO2: 97% (11-27-18 @ 04:02) (94% - 99%)  Wt(kg): --    PHYSICAL EXAM:  GENERAL: NAD, well-developed  HEAD:  Atraumatic, Normocephalic  EYES: EOMI, conjunctiva and sclera clear  NECK: Supple,  CHEST/LUNG: Clear to auscultation bilaterally; mild expiratory wheezing in left upper lung field  HEART: Regular rate and irregular rhythm; No murmurs, rubs, or gallops  ABDOMEN: Soft, Nontender, Nondistended; Bowel sounds present  EXTREMITIES:  2+ Peripheral Pulses, No clubbing, cyanosis, or edema  PSYCH: AAOx3  NEUROLOGY: non-focal  SKIN: No rashes or lesions                                10.8   13.4  )-----------( 158      ( 27 Nov 2018 07:20 )             31.7       11-27    136  |  101  |  7   ----------------------------<  83  3.7   |  21<L>  |  0.75    Ca    8.2<L>      27 Nov 2018 07:20  Phos  3.7     11-27  Mg     1.9     11-27    TPro  5.8<L>  /  Alb  3.0<L>  /  TBili  0.3  /  DBili  x   /  AST  26  /  ALT  84<H>  /  AlkPhos  126<H>  11-27          MICROBIOLOGY:        Culture - Blood (collected 11-25-18 @ 06:23)  Source: .Blood Blood-Peripheral  Preliminary Report (11-26-18 @ 07:01):    No growth to date.    Culture - Blood (collected 11-25-18 @ 06:23)  Source: .Blood Blood-Peripheral  Gram Stain (11-26-18 @ 12:14):    Growth in aerobic bottle: Gram Negative Rods  Preliminary Report (11-26-18 @ 12:14):    Growth in aerobic bottle: Gram Negative Rods            Rapid RVP Result: NotDetec (11-24 @ 00:51)          RADIOLOGY:  imaging below personally reviewed Patient is a 89y old  Female who presents with a chief complaint of cholangitis (27 Nov 2018 09:41)      HPI:  89 y.o female with known hx of depression and HTN, otherwise functional at baseline presents as a transfer from [place] on 11/24/18 . Presented to [place] ED for actue onset of fever, chill, abdominal pain with episodes of nausea/vomiting. The pain, per daughter was located in the RUQ and epigastric region.     In [place] ED, she was Febrile to 101.4, and tachycardic to 120, tachypneic to 23. Lactate was 4.6 -> 2.8, and lipase was 23341 & AST/ALT of 930/379, s/p 2.6L IVF. CTA showed gallstone pancreatitis with moderate intrahepatic, extrahepatic, and pancreatic ductal dilatation. Questionable Cholangitis and cholecystits. Abd US showed significant pericholecystic fluid and mulitple stones suggesting possible cholecystitis.  Patient transferred to [facility] for MRCP/ERCP.  s/p MRCP on 11/24. s/p ERCP on 11/25 with 7 cm biliary stent placement. BCX was positive for klebsiella. Patient was given 2 days of zosyn (11/24-11/26).  On 11/26, patient was started on ceftriaxone 1 g q 24 hrs (day 2) and metronidazole 500mg q8hrs (day 2).    Currently, denies any fevers/chills/night sweats, sob, chest pain, nasal congestion, sore throat, abdominal pain, nausea, vomiting, dysuria, urgency, frequency, diarrhea.           PAST MEDICAL & SURGICAL HISTORY:  Depression  HTN (hypertension)  No significant past surgical history      Allergies  codeine (Unknown)        ANTIMICROBIALS:  cefTRIAXone   IVPB 1 every 24 hours  metroNIDAZOLE  IVPB 500 every 8 hours      OTHER MEDS: MEDICATIONS  (STANDING):  acetaminophen   Tablet .. 650 every 6 hours PRN  amLODIPine   Tablet 5 daily  heparin  Injectable 5000 every 8 hours  metoprolol tartrate 12.5 two times a day      SOCIAL HISTORY:   hx smoking  non-smoker    FAMILY HISTORY:  No pertinent family history in first degree relatives      REVIEW OF SYSTEMS  [  ] ROS unobtainable because:    [x  ] All other systems negative except as noted below:	    Constitutional:  [ ] fever [ ] chills  [ ] weight loss  [ ] weakness  Skin:  [ ] rash [ ] phlebitis	  Eyes: [ ] icterus [ ] pain  [ ] discharge	  ENMT: [ ] sore throat  [ ] thrush [ ] ulcers [ ] exudates  Respiratory: [ ] dyspnea [ ] hemoptysis [ ] cough [ ] sputum	  Cardiovascular:  [ ] chest pain [ ] palpitations [ ] edema	  Gastrointestinal:  [ ] nausea [ ] vomiting [ ] diarrhea [ ] constipation [ ] pain	  Genitourinary:  [ ] dysuria [ ] frequency [ ] hematuria [ ] discharge [ ] flank pain  [ ] incontinence  Musculoskeletal:  [ ] myalgias [ ] arthralgias [ ] arthritis  [ ] back pain  Neurological:  [ ] headache [ ] seizures  [ ] confusion/altered mental status  Psychiatric:  [ ] anxiety [ ] depression	  Hematology/Lymphatics:  [ ] lymphadenopathy  Endocrine:  [ ] adrenal [ ] thyroid  Allergic/Immunologic:	 [ ] transplant [ ] seasonal    Vital Signs Last 24 Hrs  T(F): 97.8 (11-27-18 @ 04:02), Max: 101.7 (11-23-18 @ 23:03)    Vital Signs Last 24 Hrs  HR: 90 (11-27-18 @ 04:02) (67 - 111)  BP: 152/71 (11-27-18 @ 04:02) (130/60 - 179/92)  RR: 19 (11-27-18 @ 04:02)  SpO2: 97% (11-27-18 @ 04:02) (94% - 99%)  Wt(kg): --    PHYSICAL EXAM:  GENERAL: NAD, well-developed  HEAD:  Atraumatic, Normocephalic  EYES: EOMI, conjunctiva and sclera clear  NECK: Supple,  CHEST/LUNG: Clear to auscultation bilaterally; mild expiratory wheezing in left upper lung field  HEART: Regular rate and irregular rhythm; No murmurs, rubs, or gallops  ABDOMEN: Soft, Nontender, Nondistended; Bowel sounds present  EXTREMITIES:  2+ Peripheral Pulses, No clubbing, cyanosis, or edema  PSYCH: AAOx3  NEUROLOGY: non-focal  SKIN: No rashes or lesions                                10.8   13.4  )-----------( 158      ( 27 Nov 2018 07:20 )             31.7   WBC Count: 13.4 (11-27-18 @ 07:20)  WBC Count: 16.1 (11-27-18 @ 01:10)  WBC Count: 20.3 (11-26-18 @ 00:43)  WBC Count: 22.8 (11-25-18 @ 14:57)  WBC Count: 20.5 (11-25-18 @ 01:13)  WBC Count: 14.3 (11-24-18 @ 06:19)  WBC Count: 3.41 (11-23-18 @ 23:27)      11-27    136  |  101  |  7   ----------------------------<  83  3.7   |  21<L>  |  0.75    Ca    8.2<L>      27 Nov 2018 07:20  Phos  3.7     11-27  Mg     1.9     11-27    TPro  5.8<L>  /  Alb  3.0<L>  /  TBili  0.3  /  DBili  x   /  AST  26  /  ALT  84<H>  /  AlkPhos  126<H>  11-27          MICROBIOLOGY:        Culture - Blood (collected 11-25-18 @ 06:23)  Source: .Blood Blood-Peripheral  Preliminary Report (11-26-18 @ 07:01):    No growth to date.    Culture - Blood (collected 11-25-18 @ 06:23)  Source: .Blood Blood-Peripheral  Gram Stain (11-26-18 @ 12:14):    Growth in aerobic bottle: Gram Negative Rods  Preliminary Report (11-26-18 @ 12:14):    Growth in aerobic bottle: Gram Negative Rods            Rapid RVP Result: NotDetec (11-24 @ 00:51)          < from: Xray Chest 1 View- PORTABLE-Urgent (11.26.18 @ 05:09) >  Impression:    The heart is enlarged. The lungs are clear. Calcified aortic knob.          < end of copied text >      < from: MR MRCP No Cont (11.24.18 @ 18:07) >    IMPRESSION:  Motion limited examination.    Extensive choledocholithiasis. Distention of the common bile duct to 1.0   cm.    Numerous small layering gallstones in the gallbladder.    Pancreas poorly characterized due to motion artifacts.                  < end of copied text >

## 2018-11-27 NOTE — PROGRESS NOTE ADULT - ASSESSMENT
Impression:  89 year old female with acute cholangitis/gallstone pancreatitis now s/p ERCP    Recommendations     - Advance to regular diet today   - Continue IV fluids.   -Can continue with IV antibiotics while in the hospital. Can change to PO levaquin when ready for discharge for total of 14 days   - Follow CBC/LFTs. No need to check amylase and lipase unless patient develops significant abdominal pain after ERCP.  - Surgical consult for CCY   - Followup in GI office within 4 weeks. Call [phone] for appointment.   - Repeat ERCP within 2 months for stent/stone clearance.

## 2018-11-27 NOTE — PROGRESS NOTE ADULT - ASSESSMENT
89 y.o female with known hx of depression and HTN, otherwise functional at baseline presents from [facility] with abd pain, fever, n/v. CT concerning for gallstone pancreatitis and was transferred for possible ERCP.     #Neuro - no active issue    #CV   Septic shock:   - BP stable off pressors  - elevated troponins likely 2/2 demand ischemia, now downtrending  - will obtain EKG after ERCP  - Afib overnight, will continue to monitor, on home dose of metoprolol 12.5    HTN:  - holding home antihypertensive meds in the setting of sepsis    #Pulm   - saturating well on 2L NC  - wheezes on exam, s/p duonebs, 20IV lasix  - now euvolemic on exam    #GI  Gallstone pancreatitis  - on clear liquid diet, advance to solids tomorrow if patient tolerates   - discontinued LR due to possible volume overload, given lasix 20IV  - Repeat ERCP within 3 months for stent/stone clearance  - Followup in GI office within 4 weeks. Call [phone] for appointment    #Renal - No active issue    #Endo - No active issue     #ID  Gallstone pancreatitis   - 11/23 BCX positive for Klebsiella, repeat pending  - discontinued Zosyn, switched to Ceftriaxone and Flagyll   - will continue IV abx for 2 more days, then can transition to po  - if she starts to deteriorate clinically, will start meropenem  - trend CBC    #Heme - No active issue    #GOC  patient identifies son as surrogate decision maker  FULL CODE 89 y.o female with known hx of depression and HTN, otherwise functional at baseline presents from [facility] with abd pain, fever, n/v transferred for septic shock due to acute cholangitis and gallstone pancreatis, choledocholithiases now s/p ERCP with stent  found to have klebsiella bacteremia now downgraded from MICU.

## 2018-11-28 LAB
-  AMIKACIN: SIGNIFICANT CHANGE UP
-  AMPICILLIN/SULBACTAM: SIGNIFICANT CHANGE UP
-  AMPICILLIN: SIGNIFICANT CHANGE UP
-  AZTREONAM: SIGNIFICANT CHANGE UP
-  CEFEPIME: SIGNIFICANT CHANGE UP
-  CEFOXITIN: SIGNIFICANT CHANGE UP
-  CEFTRIAXONE: SIGNIFICANT CHANGE UP
-  CIPROFLOXACIN: SIGNIFICANT CHANGE UP
-  ERTAPENEM: SIGNIFICANT CHANGE UP
-  GENTAMICIN: SIGNIFICANT CHANGE UP
-  IMIPENEM: SIGNIFICANT CHANGE UP
-  LEVOFLOXACIN: SIGNIFICANT CHANGE UP
-  MEROPENEM: SIGNIFICANT CHANGE UP
-  PIPERACILLIN/TAZOBACTAM: SIGNIFICANT CHANGE UP
-  TOBRAMYCIN: SIGNIFICANT CHANGE UP
-  TRIMETHOPRIM/SULFAMETHOXAZOLE: SIGNIFICANT CHANGE UP
ALBUMIN SERPL ELPH-MCNC: 2.8 G/DL — LOW (ref 3.3–5)
ALP SERPL-CCNC: 115 U/L — SIGNIFICANT CHANGE UP (ref 40–120)
ALT FLD-CCNC: 62 U/L — HIGH (ref 10–45)
ANION GAP SERPL CALC-SCNC: 13 MMOL/L — SIGNIFICANT CHANGE UP (ref 5–17)
AST SERPL-CCNC: 13 U/L — SIGNIFICANT CHANGE UP (ref 10–40)
BILIRUB SERPL-MCNC: 0.3 MG/DL — SIGNIFICANT CHANGE UP (ref 0.2–1.2)
BUN SERPL-MCNC: 8 MG/DL — SIGNIFICANT CHANGE UP (ref 7–23)
CALCIUM SERPL-MCNC: 8.5 MG/DL — SIGNIFICANT CHANGE UP (ref 8.4–10.5)
CHLORIDE SERPL-SCNC: 106 MMOL/L — SIGNIFICANT CHANGE UP (ref 96–108)
CO2 SERPL-SCNC: 22 MMOL/L — SIGNIFICANT CHANGE UP (ref 22–31)
CREAT SERPL-MCNC: 0.77 MG/DL — SIGNIFICANT CHANGE UP (ref 0.5–1.3)
CULTURE RESULTS: SIGNIFICANT CHANGE UP
GLUCOSE SERPL-MCNC: 100 MG/DL — HIGH (ref 70–99)
HCT VFR BLD CALC: 32.8 % — LOW (ref 34.5–45)
HGB BLD-MCNC: 11.3 G/DL — LOW (ref 11.5–15.5)
MCHC RBC-ENTMCNC: 30.7 PG — SIGNIFICANT CHANGE UP (ref 27–34)
MCHC RBC-ENTMCNC: 34.5 GM/DL — SIGNIFICANT CHANGE UP (ref 32–36)
MCV RBC AUTO: 89.1 FL — SIGNIFICANT CHANGE UP (ref 80–100)
METHOD TYPE: SIGNIFICANT CHANGE UP
ORGANISM # SPEC MICROSCOPIC CNT: SIGNIFICANT CHANGE UP
ORGANISM # SPEC MICROSCOPIC CNT: SIGNIFICANT CHANGE UP
PLATELET # BLD AUTO: 191 K/UL — SIGNIFICANT CHANGE UP (ref 150–400)
POTASSIUM SERPL-MCNC: 3.3 MMOL/L — LOW (ref 3.5–5.3)
POTASSIUM SERPL-SCNC: 3.3 MMOL/L — LOW (ref 3.5–5.3)
PROT SERPL-MCNC: 5.8 G/DL — LOW (ref 6–8.3)
RBC # BLD: 3.68 M/UL — LOW (ref 3.8–5.2)
RBC # FLD: 13.7 % — SIGNIFICANT CHANGE UP (ref 10.3–14.5)
SODIUM SERPL-SCNC: 141 MMOL/L — SIGNIFICANT CHANGE UP (ref 135–145)
SPECIMEN SOURCE: SIGNIFICANT CHANGE UP
WBC # BLD: 8.62 K/UL — SIGNIFICANT CHANGE UP (ref 3.8–10.5)
WBC # FLD AUTO: 8.62 K/UL — SIGNIFICANT CHANGE UP (ref 3.8–10.5)

## 2018-11-28 PROCEDURE — 99232 SBSQ HOSP IP/OBS MODERATE 35: CPT

## 2018-11-28 PROCEDURE — 93010 ELECTROCARDIOGRAM REPORT: CPT | Mod: 76

## 2018-11-28 RX ORDER — POTASSIUM CHLORIDE 20 MEQ
40 PACKET (EA) ORAL ONCE
Qty: 0 | Refills: 0 | Status: COMPLETED | OUTPATIENT
Start: 2018-11-28 | End: 2018-11-28

## 2018-11-28 RX ADMIN — Medication 12.5 MILLIGRAM(S): at 05:02

## 2018-11-28 RX ADMIN — HEPARIN SODIUM 5000 UNIT(S): 5000 INJECTION INTRAVENOUS; SUBCUTANEOUS at 05:02

## 2018-11-28 RX ADMIN — Medication 40 MILLIEQUIVALENT(S): at 20:39

## 2018-11-28 RX ADMIN — MIRTAZAPINE 45 MILLIGRAM(S): 45 TABLET, ORALLY DISINTEGRATING ORAL at 21:17

## 2018-11-28 RX ADMIN — AMLODIPINE BESYLATE 5 MILLIGRAM(S): 2.5 TABLET ORAL at 05:02

## 2018-11-28 RX ADMIN — ATORVASTATIN CALCIUM 10 MILLIGRAM(S): 80 TABLET, FILM COATED ORAL at 21:17

## 2018-11-28 RX ADMIN — Medication 12.5 MILLIGRAM(S): at 17:16

## 2018-11-28 RX ADMIN — NORTRIPTYLINE HYDROCHLORIDE 25 MILLIGRAM(S): 10 CAPSULE ORAL at 11:50

## 2018-11-28 RX ADMIN — ARIPIPRAZOLE 5 MILLIGRAM(S): 15 TABLET ORAL at 11:50

## 2018-11-28 RX ADMIN — HEPARIN SODIUM 5000 UNIT(S): 5000 INJECTION INTRAVENOUS; SUBCUTANEOUS at 17:16

## 2018-11-28 NOTE — PROGRESS NOTE ADULT - ASSESSMENT
89 y.o female with known hx of depression and HTN, otherwise functional at baseline presents from [facility] with abd pain, fever, n/v transferred for septic shock due to acute cholangitis and gallstone pancreatis, choledocholithiases now s/p ERCP with stent  found to have klebsiella bacteremia now downgraded from MICU.

## 2018-11-28 NOTE — PROGRESS NOTE ADULT - ASSESSMENT
89 y.o female with known hx of depression and HTN presenting with severe sepsis diagnosed with acute cholangitis/gallstone pancreatitis,  s/p ERCP,MRCP and stent placement  Clinically much better -no pain or tenderness  Repeat blood Cx negative   Leucocytosis and LFTs downtrending  Would rec:  1) Follow clinically  2) Follow repeat Cx  3) continue  levaquin  Will need 14 day course   4)Eventual interval  ?Cholecystectomy/stent removal -will defer to surgery/GI    Will tailor plan for ID issues  per course,results.Will defer to primary team on management of other issues.  Case d/w Med NP  Will Follow.  Beeper [id number]/afterhours/No response-[id number]

## 2018-11-28 NOTE — SWALLOW BEDSIDE ASSESSMENT ADULT - SLP PERTINENT HISTORY OF CURRENT PROBLEM
Accession #: [id number] 89 y.o female with known hx of depression and HTN, otherwise functional at baseline presents as a transfer from [place]. Presented to [place] ED for actue onset of fever, chill, abdominal pain with episodes of nausea/vomiting. The pain, per daughter was located in the RUQ and epigastric region. Denies SOB, CP, no urinary symptoms.

## 2018-11-28 NOTE — PROGRESS NOTE ADULT - PROBLEM SELECTOR PLAN 8
dvt ppx HSQ  PT eval rec home PT speech eval pending  Dispo: discharge Thursday if  bcx remain negative x 48 hours

## 2018-11-28 NOTE — PROGRESS NOTE ADULT - PROBLEM SELECTOR PLAN 5
had paroxysmal afib likely precipitated by septic shock  -c/w home Toprol 12.5mg daily  -no a/c, per patient refusing at this time, aware of CVA risk.  -remains in NSR on tele

## 2018-11-28 NOTE — SWALLOW BEDSIDE ASSESSMENT ADULT - SWALLOW EVAL: DIAGNOSIS
Patient presents with grossly functional oropharyngeal swallow. Mildly prolonged yet functional oral prep/transit on solids. No overt signs of laryngeal penetration/aspiration across all consistencies trialed.

## 2018-11-28 NOTE — PROGRESS NOTE ADULT - SUBJECTIVE AND OBJECTIVE BOX
Patient is a 89y old  Female who presents with a chief complaint of septic shock (28 Nov 2018 11:12)    Being followed by ID for bacteremia     Interval history:  No acute events      ROS:  No cough,SOB,CP  No N/V/D./abd pain  No other complaints      Antimicrobials:    levoFLOXacin IVPB      levoFLOXacin IVPB 500 milliGRAM(s) IV Intermittent every 24 hours    Other medications reviewed    Vital Signs Last 24 Hrs  T(C): 36.7 (11-28-18 @ 12:15), Max: 37.4 (11-27-18 @ 21:24)  T(F): 98.1 (11-28-18 @ 12:15), Max: 99.4 (11-27-18 @ 21:24)  HR: 87 (11-28-18 @ 12:15) (87 - 91)  BP: 107/62 (11-28-18 @ 12:15) (107/62 - 158/90)  BP(mean): --  RR: 18 (11-28-18 @ 12:15) (18 - 20)  SpO2: 98% (11-28-18 @ 12:15) (98% - 98%)    Physical Exam:    cachectic    HEENT PERRLA EOMI    No oral exudate or erythema    Chest Good AE,CTA    CVS RRR S1 S2 WNl ESM no rub or gallop    Abd soft BS normal No tenderness no masses    IV site no erythema tenderness or discharge    CNS AAO X 3 no focal    Lab Data:                          11.3   8.62  )-----------( 191      ( 28 Nov 2018 08:15 )             32.8       11-28    141  |  106  |  8   ----------------------------<  100<H>  3.3<L>   |  22  |  0.77    Ca    8.5      28 Nov 2018 06:18  Phos  3.7     11-27  Mg     1.9     11-27    TPro  5.8<L>  /  Alb  2.8<L>  /  TBili  0.3  /  DBili  x   /  AST  13  /  ALT  62<H>  /  AlkPhos  115  11-28        Culture - Blood (collected 27 Nov 2018 09:26)  Source: .Blood Blood-Venous  Preliminary Report (28 Nov 2018 10:01):    No growth to date.    Culture - Blood (collected 27 Nov 2018 09:26)  Source: .Blood Blood-Peripheral  Preliminary Report (28 Nov 2018 10:01):    No growth to date.    Culture - Blood (collected 25 Nov 2018 06:23)  Source: .Blood Blood-Peripheral  Preliminary Report (26 Nov 2018 07:01):    No growth to date.    Culture - Blood (collected 25 Nov 2018 06:23)  Source: .Blood Blood-Peripheral  Gram Stain (27 Nov 2018 16:34):    Growth in aerobic bottle: Gram Negative Rods    Growth in anaerobic bottle: Gram Negative Rods  Final Report (28 Nov 2018 10:46):    Growth in aerobic and anaerobic bottles: Klebsiella pneumoniae  Organism: Klebsiella pneumoniae (28 Nov 2018 10:46)  Organism: Klebsiella pneumoniae (28 Nov 2018 10:46)      -  Amikacin: S <=16      -  Ampicillin: R >16 These ampicillin results predict results for amoxicillin      -  Ampicillin/Sulbactam: S <=8/4      -  Aztreonam: S <=4      -  Cefepime: S <=4      -  Cefoxitin: S <=8      -  Ceftriaxone: S <=1 Enterobacter, Citrobacter, and Serratia may develop resistance during prolonged therapy      -  Ciprofloxacin: S <=1      -  Ertapenem: S <=1      -  Gentamicin: S <=4      -  Imipenem: S <=1      -  Levofloxacin: S <=2      -  Meropenem: S <=1      -  Piperacillin/Tazobactam: S <=16      -  Tobramycin: S <=4      -  Trimethoprim/Sulfamethoxazole: S <=2/38      Method Type: PATRICIA    Culture - Urine (collected 24 Nov 2018 00:51)  Source: .Urine None  Final Report (25 Nov 2018 12:26):    No growth    Culture - Blood (collected 23 Nov 2018 23:22)  Source: .Blood Blood  Gram Stain (24 Nov 2018 16:22):    Growth in anaerobic bottle: Gram Negative Rods    Growth in aerobic bottle: Gram Negative Rods  Preliminary Report (25 Nov 2018 11:33):    Growth in aerobic and anaerobic bottles: Klebsiella pneumoniae    "Due to technical problems, Proteus sp. will Not be reported as part of    the BCID panel until further notice"    ***Blood Panel PCR results on this specimen are available    approximately 3 hours after the Gram stain result.***    Gram stain, PCR, and/or culture results may not always    correspond due to difference in methodologies.    ************************************************************    This PCR assay was performed using [employer].    The following targets are tested for: Enterococcus,    vancomycin resistant enterococci, Listeria monocytogenes,    coagulase negative staphylococci, S. aureus,    methicillin resistant S. aureus, Streptococcus agalactiae    (Group B), S. pneumoniae, S. pyogenes (Group A),    Acinetobacter baumannii, Enterobacter cloacae, E. coli,    Klebsiella oxytoca, K. pneumoniae, Proteus sp.,    Serratia marcescens, Haemophilus influenzae,    Neisseria meningitidis, Pseudomonas aeruginosa, Candida    albicans, C. glabrata, C krusei, C parapsilosis,    C. tropicalis and the KPC resistance gene.  Organism: Blood Culture PCR  Klebsiella pneumoniae (26 Nov 2018 09:14)  Organism: Klebsiella pneumoniae (26 Nov 2018 09:14)      -  Amikacin: S <=8      -  Amoxicillin/Clavulanic Acid: S <=8/4      -  Ampicillin: R >16 These ampicillin results predict results for amoxicillin      -  Ampicillin/Sulbactam: S <=4/2      -  Aztreonam: S <=4      -  Cefazolin: S <=2      -  Cefepime: S <=2      -  Cefotaxime: S <=2      -  Cefoxitin: S <=4      -  Ceftazidime: S <=1      -  Ceftriaxone: S <=1 Enterobacter, Citrobacter, and Serratia may develop resistance during prolonged therapy      -  Cefuroxime: S <=4      -  Ciprofloxacin: S <=0.5      -  Ertapenem: S <=0.5      -  Gentamicin: S <=1      -  Imipenem: S <=1      -  Levofloxacin: S <=1      -  Meropenem: S <=1      -  Moxifloxacin(Aerobic): S <=2      -  Piperacillin/Tazobactam: S <=8      -  Tetra/Doxy: S <=2      -  Tigecycline: S <=1      -  Tobramycin: S <=2      -  Trimethoprim/Sulfamethoxazole: S <=0.5/9.5      Method Type: PATRICIA  Organism: Blood Culture PCR (24 Nov 2018 17:45)      -  Klebsiella pneumoniae: Detec      Method Type: PCR    Culture - Blood (collected 23 Nov 2018 23:22)  Source: .Blood Blood  Gram Stain (24 Nov 2018 18:09):    Growth in anaerobic bottle: Gram Negative Rods    Growth in aerobic bottle: Gram Negative Rods  Preliminary Report (25 Nov 2018 11:35):    Growth in aerobic and anaerobic bottles: Klebsiella pneumoniae    See previous culture [id number]

## 2018-11-28 NOTE — SWALLOW BEDSIDE ASSESSMENT ADULT - COMMENTS
s/p ERCP for ascending cholangiolitis secondary to choledocholithiasis. No signs of acute cholecystitis at this time, no plans for cholecystectomy on this admission.  1. Septic Shock due to Klebsiella bacteremia  - de-escalate to CFtx (based on sensitivities) and empiric anaerobe coverage with flagyl     2. Choledocholithiasis  - GI followup for ERCP  - Advance diet as per GI recs post procedure    3. Cardiology  -tele monitoring for recurrence of afib - restarted home beta blocker  Seen by GI->Clear liquids;  11/27: advanced to regular diet per GI  10/28: on abx Leucocytosis and LFTs downtrending

## 2018-11-28 NOTE — PROGRESS NOTE ADULT - PROBLEM SELECTOR PLAN 7
stable, restart home outpatient meds as below  -c/w nortriptyline 25mg daily  -c/w aripiprazole 5mg daily  -c/w mirtazapine 45mg daily

## 2018-11-28 NOTE — PROGRESS NOTE ADULT - PROBLEM SELECTOR PLAN 1
septic shock now resolved, was secondary to acute cholangitis with klebsiella bacteremia  -afebrile,  leukocytosis resolved, HD stable  -antibiotics as below

## 2018-11-28 NOTE — PROGRESS NOTE ADULT - PROBLEM SELECTOR PLAN 2
Bcx from 11/23, 11/25 grew pansensitive klebsiella PNA from cholangitis due to choledocholithiases now s/p ERCP with stent  -repeat bcx from 11/27 NGTD  -c/w levaquin 500mg daily at least 14 days from 11/27 (creatinine clearance is 32), of note QTC on last EKG was 533, will repeat EKG now

## 2018-11-29 ENCOUNTER — MEDICATION RENEWAL (OUTPATIENT)
Age: 83
End: 2018-11-29

## 2018-11-29 ENCOUNTER — TRANSCRIPTION ENCOUNTER (OUTPATIENT)
Age: 83
End: 2018-11-29

## 2018-11-29 VITALS
OXYGEN SATURATION: 94 % | DIASTOLIC BLOOD PRESSURE: 76 MMHG | RESPIRATION RATE: 18 BRPM | SYSTOLIC BLOOD PRESSURE: 151 MMHG | HEART RATE: 90 BPM | TEMPERATURE: 99 F

## 2018-11-29 DIAGNOSIS — F45.8 OTHER SOMATOFORM DISORDERS: ICD-10-CM

## 2018-11-29 LAB
ANION GAP SERPL CALC-SCNC: 13 MMOL/L — SIGNIFICANT CHANGE UP (ref 5–17)
BUN SERPL-MCNC: 9 MG/DL — SIGNIFICANT CHANGE UP (ref 7–23)
CALCIUM SERPL-MCNC: 8.6 MG/DL — SIGNIFICANT CHANGE UP (ref 8.4–10.5)
CHLORIDE SERPL-SCNC: 107 MMOL/L — SIGNIFICANT CHANGE UP (ref 96–108)
CO2 SERPL-SCNC: 23 MMOL/L — SIGNIFICANT CHANGE UP (ref 22–31)
CREAT SERPL-MCNC: 0.87 MG/DL — SIGNIFICANT CHANGE UP (ref 0.5–1.3)
GLUCOSE SERPL-MCNC: 112 MG/DL — HIGH (ref 70–99)
HCT VFR BLD CALC: 31.7 % — LOW (ref 34.5–45)
HGB BLD-MCNC: 10.3 G/DL — LOW (ref 11.5–15.5)
MCHC RBC-ENTMCNC: 29.9 PG — SIGNIFICANT CHANGE UP (ref 27–34)
MCHC RBC-ENTMCNC: 32.5 GM/DL — SIGNIFICANT CHANGE UP (ref 32–36)
MCV RBC AUTO: 91.9 FL — SIGNIFICANT CHANGE UP (ref 80–100)
PLATELET # BLD AUTO: 211 K/UL — SIGNIFICANT CHANGE UP (ref 150–400)
POTASSIUM SERPL-MCNC: 3.7 MMOL/L — SIGNIFICANT CHANGE UP (ref 3.5–5.3)
POTASSIUM SERPL-SCNC: 3.7 MMOL/L — SIGNIFICANT CHANGE UP (ref 3.5–5.3)
RBC # BLD: 3.45 M/UL — LOW (ref 3.8–5.2)
RBC # FLD: 13.7 % — SIGNIFICANT CHANGE UP (ref 10.3–14.5)
SODIUM SERPL-SCNC: 143 MMOL/L — SIGNIFICANT CHANGE UP (ref 135–145)
WBC # BLD: 7.32 K/UL — SIGNIFICANT CHANGE UP (ref 3.8–10.5)
WBC # FLD AUTO: 7.32 K/UL — SIGNIFICANT CHANGE UP (ref 3.8–10.5)

## 2018-11-29 PROCEDURE — 85014 HEMATOCRIT: CPT

## 2018-11-29 PROCEDURE — 82435 ASSAY OF BLOOD CHLORIDE: CPT

## 2018-11-29 PROCEDURE — 85730 THROMBOPLASTIN TIME PARTIAL: CPT

## 2018-11-29 PROCEDURE — 97161 PT EVAL LOW COMPLEX 20 MIN: CPT

## 2018-11-29 PROCEDURE — 92610 EVALUATE SWALLOWING FUNCTION: CPT

## 2018-11-29 PROCEDURE — 83605 ASSAY OF LACTIC ACID: CPT

## 2018-11-29 PROCEDURE — 84132 ASSAY OF SERUM POTASSIUM: CPT

## 2018-11-29 PROCEDURE — 80053 COMPREHEN METABOLIC PANEL: CPT

## 2018-11-29 PROCEDURE — 84100 ASSAY OF PHOSPHORUS: CPT

## 2018-11-29 PROCEDURE — 83690 ASSAY OF LIPASE: CPT

## 2018-11-29 PROCEDURE — C1769: CPT

## 2018-11-29 PROCEDURE — 74181 MRI ABDOMEN W/O CONTRAST: CPT

## 2018-11-29 PROCEDURE — 80048 BASIC METABOLIC PNL TOTAL CA: CPT

## 2018-11-29 PROCEDURE — 99222 1ST HOSP IP/OBS MODERATE 55: CPT | Mod: 25

## 2018-11-29 PROCEDURE — 93005 ELECTROCARDIOGRAM TRACING: CPT

## 2018-11-29 PROCEDURE — 99239 HOSP IP/OBS DSCHRG MGMT >30: CPT

## 2018-11-29 PROCEDURE — 94640 AIRWAY INHALATION TREATMENT: CPT

## 2018-11-29 PROCEDURE — 31575 DIAGNOSTIC LARYNGOSCOPY: CPT

## 2018-11-29 PROCEDURE — 87186 SC STD MICRODIL/AGAR DIL: CPT

## 2018-11-29 PROCEDURE — 87040 BLOOD CULTURE FOR BACTERIA: CPT

## 2018-11-29 PROCEDURE — 82330 ASSAY OF CALCIUM: CPT

## 2018-11-29 PROCEDURE — 84295 ASSAY OF SERUM SODIUM: CPT

## 2018-11-29 PROCEDURE — 82962 GLUCOSE BLOOD TEST: CPT

## 2018-11-29 PROCEDURE — C2625: CPT

## 2018-11-29 PROCEDURE — 85027 COMPLETE CBC AUTOMATED: CPT

## 2018-11-29 PROCEDURE — 85610 PROTHROMBIN TIME: CPT

## 2018-11-29 PROCEDURE — 83735 ASSAY OF MAGNESIUM: CPT

## 2018-11-29 PROCEDURE — 71045 X-RAY EXAM CHEST 1 VIEW: CPT

## 2018-11-29 PROCEDURE — 82553 CREATINE MB FRACTION: CPT

## 2018-11-29 PROCEDURE — 99232 SBSQ HOSP IP/OBS MODERATE 35: CPT

## 2018-11-29 PROCEDURE — 76000 FLUOROSCOPY <1 HR PHYS/QHP: CPT

## 2018-11-29 PROCEDURE — 82550 ASSAY OF CK (CPK): CPT

## 2018-11-29 PROCEDURE — 84484 ASSAY OF TROPONIN QUANT: CPT

## 2018-11-29 PROCEDURE — 82803 BLOOD GASES ANY COMBINATION: CPT

## 2018-11-29 PROCEDURE — 82947 ASSAY GLUCOSE BLOOD QUANT: CPT

## 2018-11-29 RX ORDER — CIPROFLOXACIN LACTATE 400MG/40ML
VIAL (ML) INTRAVENOUS
Qty: 0 | Refills: 0 | Status: DISCONTINUED | OUTPATIENT
Start: 2018-11-29 | End: 2018-11-29

## 2018-11-29 RX ORDER — CIPROFLOXACIN LACTATE 400MG/40ML
250 VIAL (ML) INTRAVENOUS EVERY 12 HOURS
Qty: 0 | Refills: 0 | Status: DISCONTINUED | OUTPATIENT
Start: 2018-11-30 | End: 2018-11-29

## 2018-11-29 RX ORDER — DESVENLAFAXINE 50 MG/1
1 TABLET, EXTENDED RELEASE ORAL
Qty: 30 | Refills: 0 | OUTPATIENT
Start: 2018-11-29 | End: 2018-12-28

## 2018-11-29 RX ORDER — CIPROFLOXACIN LACTATE 400MG/40ML
1 VIAL (ML) INTRAVENOUS
Qty: 24 | Refills: 0 | OUTPATIENT
Start: 2018-11-29 | End: 2018-12-10

## 2018-11-29 RX ORDER — CIPROFLOXACIN LACTATE 400MG/40ML
250 VIAL (ML) INTRAVENOUS ONCE
Qty: 0 | Refills: 0 | Status: COMPLETED | OUTPATIENT
Start: 2018-11-29 | End: 2018-11-29

## 2018-11-29 RX ADMIN — NORTRIPTYLINE HYDROCHLORIDE 25 MILLIGRAM(S): 10 CAPSULE ORAL at 12:18

## 2018-11-29 RX ADMIN — ARIPIPRAZOLE 5 MILLIGRAM(S): 15 TABLET ORAL at 12:18

## 2018-11-29 RX ADMIN — Medication 250 MILLIGRAM(S): at 13:22

## 2018-11-29 RX ADMIN — AMLODIPINE BESYLATE 5 MILLIGRAM(S): 2.5 TABLET ORAL at 05:05

## 2018-11-29 RX ADMIN — HEPARIN SODIUM 5000 UNIT(S): 5000 INJECTION INTRAVENOUS; SUBCUTANEOUS at 05:06

## 2018-11-29 RX ADMIN — Medication 12.5 MILLIGRAM(S): at 05:05

## 2018-11-29 NOTE — DISCHARGE NOTE ADULT - ADDITIONAL INSTRUCTIONS
Make appointments to follow up with your out patient physicians.  Bring all discharge paperwork including discharge medication list to your follow up appointments.  Followup in GI office within 4 weeks. Call [phone] for appointment.  Repeat ERCP within 2 months for stent/stone clearance.

## 2018-11-29 NOTE — CONSULT NOTE ADULT - PROBLEM SELECTOR RECOMMENDATION 9
- no foreign body seen on indirect laryngoscopy   - no ENT intervention required  - Patient should follow up in ENT office as an outpatient if symptoms persist. May see Dr. Nelson or Alex. Call [phone].

## 2018-11-29 NOTE — PROGRESS NOTE ADULT - PROBLEM SELECTOR PLAN 6
BP stable  -c/w metoprolol 12.5mg bid  -c/w amlodipine 5mg daily  -monitor bp had paroxysmal afib likely precipitated by septic shock  -c/w home Toprol 12.5mg daily  -no a/c, per patient refusing at this time, aware of CVA risk.  -remains in NSR on tele

## 2018-11-29 NOTE — PROGRESS NOTE ADULT - ASSESSMENT
89 y.o female with known hx of depression and HTN presenting with severe sepsis diagnosed with acute cholangitis/gallstone pancreatitis,  s/p ERCP,MRCP and stent placement  Clinically much better -no pain or tenderness  Repeat blood Cx negative   Leucocytosis and LFTs downtrending  Can switch to po cipro(thoughrisk with lvaquin of QTC low hers is already high-cipro much lower)  Total 14 day course  s/s worsening discussed with son-asked to bring to ER if occur  Patient to follow in ID clinic in 3-4 weeks  Other plan per primary  Patient being dced home today

## 2018-11-29 NOTE — DISCHARGE NOTE ADULT - CARE PROVIDERS DIRECT ADDRESSES
,[email or web address] ,[email or web address]

## 2018-11-29 NOTE — DISCHARGE NOTE ADULT - MEDICATION SUMMARY - MEDICATIONS TO STOP TAKING
I will STOP taking the medications listed below when I get home from the hospital:    heparin  -- 5000 unit(s) subcutaneous every 8 hours    amoxicillin-clavulanate 400 mg-57 mg/5 mL oral liquid  -- 6.3 milliliter(s) by mouth 3 times a day   -- Expires___________________  Finish all this medication unless otherwise directed by prescriber.  Refrigerate and shake well.  Expires_______________________  Take with food or milk.

## 2018-11-29 NOTE — DISCHARGE NOTE ADULT - MEDICATION SUMMARY - MEDICATIONS TO TAKE
I will START or STAY ON the medications listed below when I get home from the hospital:    acetaminophen 325 mg oral tablet  -- 2 tab(s) by mouth every 6 hours, As needed, Mild Pain (1 - 3)  -- Indication: For Pain    mirtazapine 45 mg oral tablet  -- 1 tab(s) by mouth once a day (at bedtime)  -- Indication: For Depression    nortriptyline 50 mg oral capsule  -- 1 cap(s) by mouth once a day (at bedtime)  -- Indication: For Depression    desvenlafaxine 100 mg oral tablet, extended release  -- 1 tab(s) by mouth once a day  -- Indication: For Depression    atorvastatin 10 mg oral tablet  -- 1 tab(s) by mouth once a day (at bedtime)  -- Indication: For HLD    ARIPiprazole 5 mg oral tablet  -- 1 tab(s) by mouth once a day  -- Indication: For Depression    busPIRone 30 mg oral tablet  -- 1 tab(s) by mouth once a day  -- Indication: For Depression    Metoprolol Tartrate 25 mg oral tablet  -- 0.5 tab(s) by mouth 2 times a day  -- Indication: For HTN (hypertension)    amLODIPine 5 mg oral tablet  -- 1 tab(s) by mouth once a day  -- Indication: For HTN (hypertension)    ciprofloxacin 250 mg oral tablet  -- 1 tab(s) by mouth every 12 hours   -- Avoid prolonged or excessive exposure to direct and/or artificial sunlight while taking this medication.  Check with your doctor before becoming pregnant.  Do not take dairy products, antacids, or iron preparations within one hour of this medication.  Finish all this medication unless otherwise directed by prescriber.  Medication should be taken with plenty of water.    -- Indication: For Bacteremia due to Klebsiella pneumoniae    cholecalciferol 1000 intl units oral tablet  -- 1 tab(s) by mouth once a day  -- Indication: For Supplement

## 2018-11-29 NOTE — PROGRESS NOTE ADULT - PROBLEM SELECTOR PLAN 4
Improved, tolerated po diet, due to choledocholithiases, lft improving  -trend lfts, improving
Improved, tolerated po diet, due to choledocholithiases, lft improving  -trend lfts, improving
Improved, tolerated po diet, due to choledocholithiases, lft improving  -trend lfts

## 2018-11-29 NOTE — PROGRESS NOTE ADULT - PROBLEM SELECTOR PLAN 2
Bcx from 11/23, 11/25 grew pansensitive klebsiella PNA from cholangitis due to choledocholithiases now s/p ERCP with stent  -repeat bcx from 11/27 NGTD x 48 hours now, afebrile,   -given prolonged QTc to 516, age, discussed with ID rec switching levaquin to cipro 250mg bid (creatinine clearance 28) for 14 days from negative blood cx on 11/27,   -outpatient ID followup

## 2018-11-29 NOTE — PROGRESS NOTE ADULT - PROBLEM SELECTOR PLAN 8
dvt ppx HSQ  PT eval rec home PT speech eval pending  Dispo: discharge today with outpatient ID, surgery, GI, PCP followup  spent 38 minutes on discharge process time stable, restart home outpatient meds as below  -c/w nortriptyline 25mg daily  -c/w aripiprazole 5mg daily  -c/w mirtazapine 45mg daily

## 2018-11-29 NOTE — PROGRESS NOTE ADULT - PROBLEM SELECTOR PLAN 9
dvt ppx HSQ  PT eval rec home PT speech eval pending  Dispo: discharge today with outpatient ID, surgery, GI, PCP followup  spent 38 minutes on discharge process time

## 2018-11-29 NOTE — PROGRESS NOTE ADULT - SUBJECTIVE AND OBJECTIVE BOX
Patient is a 89y old  Female who presents with a chief complaint of bacteremia (29 Nov 2018 11:26)      SUBJECTIVE / OVERNIGHT EVENTS: No overnight events. This AM reports saying that she thinks a pill is stuck in her throat since last night. Denies any dysphagia, odynophagia, chest pain, sob, fever.s    MEDICATIONS  (STANDING):  amLODIPine   Tablet 5 milliGRAM(s) Oral daily  ARIPiprazole 5 milliGRAM(s) Oral daily  atorvastatin 10 milliGRAM(s) Oral at bedtime  ciprofloxacin     Tablet 250 milliGRAM(s) Oral once  ciprofloxacin     Tablet      heparin  Injectable 5000 Unit(s) SubCutaneous every 12 hours  metoprolol tartrate 12.5 milliGRAM(s) Oral two times a day  mirtazapine 45 milliGRAM(s) Oral at bedtime  nortriptyline 25 milliGRAM(s) Oral daily    MEDICATIONS  (PRN):  acetaminophen   Tablet .. 650 milliGRAM(s) Oral every 6 hours PRN Temp greater or equal to 38C (100.4F)      Vital Signs Last 24 Hrs  T(C): 36.5 (29 Nov 2018 12:04), Max: 37 (28 Nov 2018 20:05)  T(F): 97.7 (29 Nov 2018 12:04), Max: 98.6 (28 Nov 2018 20:05)  HR: 84 (29 Nov 2018 12:04) (84 - 101)  BP: 157/75 (29 Nov 2018 12:04) (116/69 - 157/75)  BP(mean): --  RR: 18 (29 Nov 2018 12:04) (18 - 18)  SpO2: 98% (29 Nov 2018 12:04) (97% - 98%)  CAPILLARY BLOOD GLUCOSE        I&O's Summary    28 Nov 2018 07:01  -  29 Nov 2018 07:00  --------------------------------------------------------  IN: 640 mL / OUT: 530 mL / NET: 110 mL    29 Nov 2018 07:01  -  29 Nov 2018 13:13  --------------------------------------------------------  IN: 120 mL / OUT: 0 mL / NET: 120 mL          \PHYSICAL EXAM:  GENERAL: NAD, well-developed  HEAD:  Atraumatic, Normocephalic  NECK: Supple, No JVD, no palpable pills   CHEST/LUNG: Clear to auscultation bilaterally; No wheeze  HEART: Regular rate and rhythm; No murmurs, rubs, or gallops  ABDOMEN: Soft, Nontender, Nondistended; Bowel sounds present  EXTREMITIES:  2+ Peripheral Pulses, No clubbing, cyanosis, or edema  PSYCH: AAOx3  NEUROLOGY: non-focal  SKIN: No rashes or lesions      LABS:                        10.3   7.32  )-----------( 211      ( 29 Nov 2018 08:00 )             31.7     11-29    143  |  107  |  9   ----------------------------<  112<H>  3.7   |  23  |  0.87    Ca    8.6      29 Nov 2018 06:53    TPro  5.8<L>  /  Alb  2.8<L>  /  TBili  0.3  /  DBili  x   /  AST  13  /  ALT  62<H>  /  AlkPhos  115  11-28              Culture - Blood (collected 27 Nov 2018 09:26)  Source: .Blood Blood-Venous  Preliminary Report (28 Nov 2018 10:01):    No growth to date.    Culture - Blood (collected 27 Nov 2018 09:26)  Source: .Blood Blood-Peripheral  Preliminary Report (28 Nov 2018 10:01):    No growth to date.          RADIOLOGY & ADDITIONAL TESTS:    tele reviewed: sinus 80-110s    Imaging Personally Reviewed:    Consultant(s) Notes Reviewed:  ENT, ID    Care Discussed with Consultants/Other Providers:

## 2018-11-29 NOTE — PROGRESS NOTE ADULT - REASON FOR ADMISSION
abd pain
bacteremia
cholangitis
septic shock with bacteremia with cholangitis
septic shock
septic shock

## 2018-11-29 NOTE — DISCHARGE NOTE ADULT - CARE PROVIDER_API CALL
Darshan Jackman), Gastroenterology; Internal Medicine  [address] [place] [address]  Phone: [phone]  Fax: [phone] Darshan Jackman), Gastroenterology; Internal Medicine  [address] [place] [address]  Phone: [phone]  Fax: [phone]    Thaddeus Corbin), Infectious Disease; Internal Medicine  [address] [place] [address]  Phone: [phone]  Fax: [phone] Darshan Jackman), Gastroenterology; Internal Medicine  [address] [place] [address]  Phone: [phone]  Fax: [phone]    Thaddeus Corbin), Infectious Disease; Internal Medicine  [address] [place] [address]  Phone: [phone]  Fax: [phone]    Erick Benentt), Surgery; Surgical Critical Care  [address] [place] [address]  Phone: [phone]  Fax: [phone]

## 2018-11-29 NOTE — CONSULT NOTE ADULT - ASSESSMENT
89y with globus sensation, pending indirect laryngoscopy 89y with globus sensation, indirect laryngoscopy was normal, left VC sluggish no signs of trauma or FB

## 2018-11-29 NOTE — PROGRESS NOTE ADULT - PROBLEM SELECTOR PLAN 7
stable, restart home outpatient meds as below  -c/w nortriptyline 25mg daily  -c/w aripiprazole 5mg daily  -c/w mirtazapine 45mg daily BP stable  -c/w metoprolol 12.5mg bid  -c/w amlodipine 5mg daily  -monitor bp

## 2018-11-29 NOTE — CONSULT NOTE ADULT - ATTENDING COMMENTS
Patient seen and examined. Agree with above. Discussed with biliary team. Obtain MRCP, will plan for possible ERCP based on MRCP findings and clinical course. Fevers and pain could be due to pancreatitis or cholecystitis, but CBD stones is also in differential. Continue board spectrum antibiotics, monitor liver tests.
Patient seen and examined  Above verified  Agree with above unless as noted below.  89 y.o female with known hx of depression and HTN presenting with severe sepsis diagnosed with acute cholangitis/gallstone pancreatitis,  s/p ERCP,MRCP and stent placement  Clinically much better -no pain or tenderness  Repeat blood Cx from today pending  Leucocytosis and LFTs downtrending  Would rec:  1) Follow clinically  2) Follow repeat Cx  3) Can change to levaquin  Will need 14 day course   4)Eventual interval  ?Cholecystectomy/stent removal -will defer to surgery/GI    Will tailor plan for ID issues  per course,results.Will defer to primary team on management of other issues.  Case d/w Dr Landaverde  Will Follow.  Beeper [id number]-[id number]/afterhours/No response-[id number]
Pt seen and examined at 10pm on 11/24, agree with above.     1. Choledocholithiasis with biliary pancreatitis +/- ascending cholangitis with GN bacteremia and sepsis:  - MRCP done; on my review of the images, there is choledocholithiasis on both CT and MRI  - Will need ERCP if official MRCP reading confirms choledocholithiasis  - IV antibiotics to cover GNs  - Trend LFTs  - Will continue to follow with you
pt tolerating regular diet for lunch. no evidence of trauma or foreign body noted of scope. pt stated she felt that after the scope whatever was there had passed down and she felt relieved. f/u as outpatient if sensation recurs.

## 2018-11-29 NOTE — PROGRESS NOTE ADULT - NSHPATTENDINGPLANDISCUSS_GEN_ALL_CORE
MICU team
patient herself
FRANCES Almonte and daughter
FRANCES Almonte, attempted to reach family multiple times but no answer
FRANCES Urbina

## 2018-11-29 NOTE — DISCHARGE NOTE ADULT - PATIENT PORTAL LINK FT
You can access the [facility] Portal, offered by [facility], by registering with the following website: [email or web address]

## 2018-11-29 NOTE — DISCHARGE NOTE ADULT - PROVIDER TOKENS
TOKEN:'[id number]' TOKEN:'[id number]:'[id number]' TOKEN:'[id number]',TOKEN:'[id number]',TOKEN:'[id number]'

## 2018-11-29 NOTE — PROGRESS NOTE ADULT - PROBLEM SELECTOR PLAN 5
had paroxysmal afib likely precipitated by septic shock  -c/w home Toprol 12.5mg daily  -no a/c, per patient refusing at this time, aware of CVA risk.  -remains in NSR on tele thinks pill stuck in throat - no respiratory or dysphagia symptoms - ENT consulted  - no foreign body seen on indirect laryngoscopy   - no ENT intervention required  - Patient should follow up in ENT office as an outpatient if symptoms persist. May see Dr. Nelson or Alex. Call [phone].

## 2018-11-29 NOTE — CONSULT NOTE ADULT - SUBJECTIVE AND OBJECTIVE BOX
CC: globus sensation     HPI:  89 y.o female with known hx of depression and HTN presenting with severe sepsis diagnosed with acute cholangitis/gallstone pancreatitis, s/p ERCP, MRCP and stent placement now complaining of globus sensation after swallow pills last night.  Pt denies any dysphagia, odynophagia, dysphonia, sob, dyspnea, changes in voice or inability to tolerated secretion.       PAST MEDICAL & SURGICAL HISTORY:  Depression  HTN (hypertension)  No significant past surgical history    Allergies    codeine (Unknown)    Intolerances      MEDICATIONS  (STANDING):  amLODIPine   Tablet 5 milliGRAM(s) Oral daily  ARIPiprazole 5 milliGRAM(s) Oral daily  atorvastatin 10 milliGRAM(s) Oral at bedtime  heparin  Injectable 5000 Unit(s) SubCutaneous every 12 hours  levoFLOXacin IVPB      levoFLOXacin IVPB 500 milliGRAM(s) IV Intermittent every 24 hours  metoprolol tartrate 12.5 milliGRAM(s) Oral two times a day  mirtazapine 45 milliGRAM(s) Oral at bedtime  nortriptyline 25 milliGRAM(s) Oral daily    MEDICATIONS  (PRN):  acetaminophen   Tablet .. 650 milliGRAM(s) Oral every 6 hours PRN Temp greater or equal to 38C (100.4F)      Social History: no tobacco, no etoh     Family history: Pt denies any sign FHx    ROS:   ENT: all negative except as noted in HPI   CV: denies palpitations  Pulm: denies SOB, cough, hemoptysis  GI: denies change in apetite, indigestion, n/v  : denies pertinent urinary symptoms, urgency  Neuro: denies numbness/tingling, loss of sensation  Psych: denies anxiety  MS: denies muscle weakness, instability  Heme: denies easy bruising or bleeding  Endo: denies heat/cold intolerance, excessive sweating  Vascular: denies LE edema    Vital Signs Last 24 Hrs  T(C): 36.3 (29 Nov 2018 04:01), Max: 37 (28 Nov 2018 20:05)  T(F): 97.4 (29 Nov 2018 04:01), Max: 98.6 (28 Nov 2018 20:05)  HR: 85 (29 Nov 2018 04:01) (85 - 101)  BP: 137/69 (29 Nov 2018 04:01) (107/62 - 149/66)  BP(mean): --  RR: 18 (29 Nov 2018 04:01) (18 - 18)  SpO2: 97% (29 Nov 2018 04:01) (97% - 98%)                          10.3   7.32  )-----------( 211      ( 29 Nov 2018 08:00 )             31.7    11-29    143  |  107  |  9   ----------------------------<  112<H>  3.7   |  23  |  0.87    Ca    8.6      29 Nov 2018 06:53    TPro  5.8<L>  /  Alb  2.8<L>  /  TBili  0.3  /  DBili  x   /  AST  13  /  ALT  62<H>  /  AlkPhos  115  11-28       PHYSICAL EXAM:  Gen: NAD  Skin: No rashes, bruises, or lesions  Head: Normocephalic, Atraumatic  Face: no edema, erythema, or fluctuance. Parotid glands soft without mass  Eyes: no scleral injection  Nose: Nares bilaterally patent, no discharge  Mouth: No Stridor / Drooling / Trismus.  Mucosa moist, tongue/uvula midline, oropharynx clear  Neck: Flat, supple, no lymphadenopathy, trachea midline, no masses  Lymphatic: No lymphadenopathy  Resp: breathing easily, no stridor  CV: no peripheral edema/cyanosis  GI: nondistended   Peripheral vascular: no JVD or edema  Neuro: facial nerve intact, no facial droop        Fiberoptic Indirect laryngoscopy:  (Scope #2 used) CC: globus sensation     HPI:  89 y.o female with known hx of depression and HTN presenting with severe sepsis diagnosed with acute cholangitis/gallstone pancreatitis, s/p ERCP, MRCP and stent placement now complaining of globus sensation after swallow pills last night.  Pt denies any dysphagia, odynophagia, dysphonia, sob, dyspnea, changes in voice or inability to tolerated secretion.       PAST MEDICAL & SURGICAL HISTORY:  Depression  HTN (hypertension)  No significant past surgical history    Allergies    codeine (Unknown)    Intolerances      MEDICATIONS  (STANDING):  amLODIPine   Tablet 5 milliGRAM(s) Oral daily  ARIPiprazole 5 milliGRAM(s) Oral daily  atorvastatin 10 milliGRAM(s) Oral at bedtime  heparin  Injectable 5000 Unit(s) SubCutaneous every 12 hours  levoFLOXacin IVPB      levoFLOXacin IVPB 500 milliGRAM(s) IV Intermittent every 24 hours  metoprolol tartrate 12.5 milliGRAM(s) Oral two times a day  mirtazapine 45 milliGRAM(s) Oral at bedtime  nortriptyline 25 milliGRAM(s) Oral daily    MEDICATIONS  (PRN):  acetaminophen   Tablet .. 650 milliGRAM(s) Oral every 6 hours PRN Temp greater or equal to 38C (100.4F)      Social History: no tobacco, no etoh     Family history: Pt denies any sign FHx    ROS:   ENT: all negative except as noted in HPI   CV: denies palpitations  Pulm: denies SOB, cough, hemoptysis  GI: denies change in apetite, indigestion, n/v  : denies pertinent urinary symptoms, urgency  Neuro: denies numbness/tingling, loss of sensation  Psych: denies anxiety  MS: denies muscle weakness, instability  Heme: denies easy bruising or bleeding  Endo: denies heat/cold intolerance, excessive sweating  Vascular: denies LE edema    Vital Signs Last 24 Hrs  T(C): 36.3 (29 Nov 2018 04:01), Max: 37 (28 Nov 2018 20:05)  T(F): 97.4 (29 Nov 2018 04:01), Max: 98.6 (28 Nov 2018 20:05)  HR: 85 (29 Nov 2018 04:01) (85 - 101)  BP: 137/69 (29 Nov 2018 04:01) (107/62 - 149/66)  BP(mean): --  RR: 18 (29 Nov 2018 04:01) (18 - 18)  SpO2: 97% (29 Nov 2018 04:01) (97% - 98%)                          10.3   7.32  )-----------( 211      ( 29 Nov 2018 08:00 )             31.7    11-29    143  |  107  |  9   ----------------------------<  112<H>  3.7   |  23  |  0.87    Ca    8.6      29 Nov 2018 06:53    TPro  5.8<L>  /  Alb  2.8<L>  /  TBili  0.3  /  DBili  x   /  AST  13  /  ALT  62<H>  /  AlkPhos  115  11-28       PHYSICAL EXAM:  Gen: NAD  Skin: No rashes, bruises, or lesions  Head: Normocephalic, Atraumatic  Face: no edema, erythema, or fluctuance. Parotid glands soft without mass  Eyes: no scleral injection  Nose: Nares bilaterally patent, no discharge  Mouth: No Stridor / Drooling / Trismus.  Mucosa moist, tongue/uvula midline, oropharynx clear  Neck: Flat, supple, no lymphadenopathy, trachea midline, no masses  Lymphatic: No lymphadenopathy  Resp: breathing easily, no stridor  CV: no peripheral edema/cyanosis  GI: nondistended   Peripheral vascular: no JVD or edema  Neuro: facial nerve intact, no facial droop        Fiberoptic Indirect laryngoscopy:  (Scope #2 used) Reason for Laryngoscopy:    Patient was unable to cooperate with mirror. left VC sluggish, no signs of trauma or FB  Nasopharynx, oropharynx, and hypopharynx clear, no bleeding. Tongue base, posterior pharyngeal wall, vallecula, epiglottis, and subglottis appear normal. No erythema, edema, pooling of secretions, masses or lesions. Airway patent, no foreign body visualized. No glottic/supraglottic edema. True vocal cords, arytenoids, vestibular folds, ventricles, pyriform sinuses, and aryepiglottic folds appear normal bilaterally. Vocal cords mobile with good contact b/l. CC: globus sensation     HPI:  89 y.o female with known hx of depression and HTN presenting with severe sepsis diagnosed with acute cholangitis/gallstone pancreatitis, s/p ERCP, MRCP and stent placement now complaining of globus sensation after swallow pills last night.  She notes the pills were split in half and had some sharp edges. Pt denies any dysphagia, odynophagia, dysphonia, sob, dyspnea, changes in voice or inability to tolerated secretion.       PAST MEDICAL & SURGICAL HISTORY:  Depression  HTN (hypertension)  No significant past surgical history    Allergies    codeine (Unknown)    Intolerances      MEDICATIONS  (STANDING):  amLODIPine   Tablet 5 milliGRAM(s) Oral daily  ARIPiprazole 5 milliGRAM(s) Oral daily  atorvastatin 10 milliGRAM(s) Oral at bedtime  heparin  Injectable 5000 Unit(s) SubCutaneous every 12 hours  levoFLOXacin IVPB      levoFLOXacin IVPB 500 milliGRAM(s) IV Intermittent every 24 hours  metoprolol tartrate 12.5 milliGRAM(s) Oral two times a day  mirtazapine 45 milliGRAM(s) Oral at bedtime  nortriptyline 25 milliGRAM(s) Oral daily    MEDICATIONS  (PRN):  acetaminophen   Tablet .. 650 milliGRAM(s) Oral every 6 hours PRN Temp greater or equal to 38C (100.4F)      Social History: no tobacco, no etoh     Family history: Pt denies any sign FHx    ROS:   ENT: all negative except as noted in HPI   CV: denies palpitations  Pulm: denies SOB, cough, hemoptysis  GI: denies change in apetite, indigestion, n/v  : denies pertinent urinary symptoms, urgency  Neuro: denies numbness/tingling, loss of sensation  Psych: denies anxiety  MS: denies muscle weakness, instability  Heme: denies easy bruising or bleeding  Endo: denies heat/cold intolerance, excessive sweating  Vascular: denies LE edema    Vital Signs Last 24 Hrs  T(C): 36.3 (29 Nov 2018 04:01), Max: 37 (28 Nov 2018 20:05)  T(F): 97.4 (29 Nov 2018 04:01), Max: 98.6 (28 Nov 2018 20:05)  HR: 85 (29 Nov 2018 04:01) (85 - 101)  BP: 137/69 (29 Nov 2018 04:01) (107/62 - 149/66)  BP(mean): --  RR: 18 (29 Nov 2018 04:01) (18 - 18)  SpO2: 97% (29 Nov 2018 04:01) (97% - 98%)                          10.3   7.32  )-----------( 211      ( 29 Nov 2018 08:00 )             31.7    11-29    143  |  107  |  9   ----------------------------<  112<H>  3.7   |  23  |  0.87    Ca    8.6      29 Nov 2018 06:53    TPro  5.8<L>  /  Alb  2.8<L>  /  TBili  0.3  /  DBili  x   /  AST  13  /  ALT  62<H>  /  AlkPhos  115  11-28       PHYSICAL EXAM:  Gen: NAD  Skin: No rashes, bruises, or lesions  Head: Normocephalic, Atraumatic  Face: no edema, erythema, or fluctuance. Parotid glands soft without mass  Eyes: no scleral injection  Nose: Nares bilaterally patent, no discharge  Mouth: No Stridor / Drooling / Trismus.  Mucosa moist, tongue/uvula midline, oropharynx clear  Neck: Flat, supple, no lymphadenopathy, trachea midline, no masses  Lymphatic: No lymphadenopathy  Resp: breathing easily, no stridor  CV: no peripheral edema/cyanosis  GI: nondistended   Peripheral vascular: no JVD or edema  Neuro: facial nerve intact, no facial droop        Fiberoptic Indirect laryngoscopy:  (Scope #2 used) Reason for Laryngoscopy:    Patient was unable to cooperate with mirror. left VC sluggish, no signs of trauma or FB  Nasopharynx, oropharynx, and hypopharynx clear, no bleeding. Tongue base, posterior pharyngeal wall, vallecula, epiglottis, and subglottis appear normal. No erythema, edema, pooling of secretions, masses or lesions. Airway patent, no foreign body visualized. No glottic/supraglottic edema. True vocal cords, arytenoids, vestibular folds, ventricles, pyriform sinuses, and aryepiglottic folds appear normal bilaterally. Vocal cords mobile with good contact b/l.

## 2018-11-29 NOTE — PROGRESS NOTE ADULT - ASSESSMENT
89 y.o female with known hx of depression and HTN, otherwise functional at baseline presents from [facility] with abd pain, fever, n/v transferred for septic shock due to acute cholangitis and gallstone pancreatis, choledocholithiases now s/p ERCP with stent  found to have klebsiella bacteremia now downgraded from MICU.

## 2018-11-29 NOTE — DISCHARGE NOTE ADULT - CARE PLAN
Principal Discharge DX:	Acute cholangitis due to calculus of bile duct with obstruction  Goal:	Remain without infection  Assessment and plan of treatment:	Continue antibiotics as prescribed  Repeat ERCP within 2 months for stent/stone clearance.  Secondary Diagnosis:	Gallstone pancreatitis  Assessment and plan of treatment:	Continue antibiotics as prescribed  Repeat ERCP within 2 months for stent/stone clearance.  Secondary Diagnosis:	Bacteremia due to Klebsiella pneumoniae  Assessment and plan of treatment:	Take all antibiotics as ordered.  Call you Health care provider upon arrival home to make a one week follow up appointment.  If you develop fever, chills, malaise, or change in mental status call your Health Care Provider or go to the Emergency Department.  Nutrition is important, eat small frequent meals to help ensure you get adequate calories.  Do not stay in bed all day!  Increase your activity daily as tolerated.  Secondary Diagnosis:	HTN (hypertension)  Assessment and plan of treatment:	Low salt diet  Activity as tolerated.  Take all medication as prescribed.  Follow up with your medical doctor for routine blood pressure monitoring at your next visit.  Notify your doctor if you have any of the following symptoms:   Dizziness, Lightheadedness, Blurry vision, Headache, Chest pain, Shortness of breath Principal Discharge DX:	Acute cholangitis due to calculus of bile duct with obstruction  Goal:	Remain without infection  Assessment and plan of treatment:	Continue antibiotics as prescribed  Repeat ERCP within 2 months for stent/stone clearance within 2m onths  Secondary Diagnosis:	Gallstone pancreatitis  Assessment and plan of treatment:	Continue antibiotics as prescribed  Repeat ERCP within 2 months for stent/stone clearance.  Secondary Diagnosis:	Bacteremia due to Klebsiella pneumoniae  Assessment and plan of treatment:	Take all antibiotics as ordered.  Call you Health care provider upon arrival home to make a one week follow up appointment.  If you develop fever, chills, malaise, or change in mental status call your Health Care Provider or go to the Emergency Department.  Nutrition is important, eat small frequent meals to help ensure you get adequate calories.  Do not stay in bed all day!  Increase your activity daily as tolerated.  Secondary Diagnosis:	HTN (hypertension)  Assessment and plan of treatment:	Low salt diet  Activity as tolerated.  Take all medication as prescribed.  Follow up with your medical doctor for routine blood pressure monitoring at your next visit.  Notify your doctor if you have any of the following symptoms:   Dizziness, Lightheadedness, Blurry vision, Headache, Chest pain, Shortness of breath

## 2018-11-29 NOTE — DISCHARGE NOTE ADULT - PLAN OF CARE
Remain without infection Continue antibiotics as prescribed  Repeat ERCP within 2 months for stent/stone clearance. Take all antibiotics as ordered.  Call you Health care provider upon arrival home to make a one week follow up appointment.  If you develop fever, chills, malaise, or change in mental status call your Health Care Provider or go to the Emergency Department.  Nutrition is important, eat small frequent meals to help ensure you get adequate calories.  Do not stay in bed all day!  Increase your activity daily as tolerated. Low salt diet  Activity as tolerated.  Take all medication as prescribed.  Follow up with your medical doctor for routine blood pressure monitoring at your next visit.  Notify your doctor if you have any of the following symptoms:   Dizziness, Lightheadedness, Blurry vision, Headache, Chest pain, Shortness of breath Continue antibiotics as prescribed  Repeat ERCP within 2 months for stent/stone clearance within 2m onths

## 2018-11-29 NOTE — DISCHARGE NOTE ADULT - HOSPITAL COURSE
89 y.o female with known hx of depression and HTN, otherwise functional at baseline presents from [facility] with abd pain, fever, n/v transferred for septic shock due to acute cholangitis and gallstone pancreatis, choledocholithiases now s/p ERCP with stent  found to have klebsiella bacteremia now downgraded from MICU.     Problem: Septic shock due to Klebsiella pneumoniae.  Plan: septic shock now resolved, was secondary to acute cholangitis with klebsiella bacteremia  -afebrile,  leukocytosis resolved, HD stable  -antibiotics as below.   Bacteremia due to Klebsiella pneumoniae.  Plan: Bcx from 11/23, 11/25 grew pansensitive klebsiella PNA from cholangitis due to choledocholithiases now s/p ERCP with stent  -repeat bcx from 11/27 Negative  -c/w levaquin 500mg daily at least 14 days from 11/27 (creatinine clearance is 32), of note QTC on last EKG was 533, will repeat EKG now.     Acute cholangitis due to calculus of bile duct with obstruction.  Plan: plan as above now s/p ERCP stent on antibiotics  -tolerated regular diet   repeat ERCP within 2 months for stent/stone clearance with GI followup  -LFTS improved  -surgery: no plans for inpatient surgery, outpatient f/u.    Gallstone pancreatitis.  Plan: Improved, tolerated po diet, due to choledocholithiases, lft improved    Paroxysmal A-fib.  Plan: had paroxysmal afib likely precipitated by septic shock  -c/w home Toprol 12.5mg daily  -no a/c, per patient refusing at this time, aware of CVA risk.  -remains in NSR on tele.    HTN (hypertension). Plan: BP stable  -c/w metoprolol 12.5mg bid  -c/w amlodipine 5mg daily  -monitor bp.    Depression.  Plan: stable, restart home outpatient meds as below  -c/w nortriptyline 25mg daily  -c/w aripiprazole 5mg daily  -c/w mirtazapine 45mg daily.   D/C to home with home PT

## 2018-11-29 NOTE — PROGRESS NOTE ADULT - SUBJECTIVE AND OBJECTIVE BOX
Patient is a 89y old  Female who presents with a chief complaint of septic shock (29 Nov 2018 13:13)    Being followed by ID for bacteremia     Interval history:  No acute events      ROS:  No cough,SOB,CP  No N/V/D./abd pain  No other complaints      Antimicrobials:    ciprofloxacin     Tablet        Other medications reviewed    Vital Signs Last 24 Hrs  T(C): 37.1 (11-29-18 @ 14:00), Max: 37.1 (11-29-18 @ 14:00)  T(F): 98.7 (11-29-18 @ 14:00), Max: 98.7 (11-29-18 @ 14:00)  HR: 90 (11-29-18 @ 14:00) (84 - 101)  BP: 151/76 (11-29-18 @ 14:00) (116/69 - 157/75)  BP(mean): --  RR: 18 (11-29-18 @ 14:00) (18 - 18)  SpO2: 94% (11-29-18 @ 14:00) (94% - 98%)    Physical Exam:        HEENT PERRLA EOMI    No oral exudate or erythema    Chest Good AE,CTA    CVS RRR S1 S2 WNl No murmur or rub or gallop    Abd soft BS normal No tenderness no masses    IV site no erythema tenderness or discharge    CNS AAO X 3 no focal    Lab Data:                          10.3   7.32  )-----------( 211      ( 29 Nov 2018 08:00 )             31.7       11-29    143  |  107  |  9   ----------------------------<  112<H>  3.7   |  23  |  0.87    Ca    8.6      29 Nov 2018 06:53    TPro  5.8<L>  /  Alb  2.8<L>  /  TBili  0.3  /  DBili  x   /  AST  13  /  ALT  62<H>  /  AlkPhos  115  11-28        Culture - Blood (collected 27 Nov 2018 09:26)  Source: .Blood Blood-Venous  Preliminary Report (28 Nov 2018 10:01):    No growth to date.    Culture - Blood (collected 27 Nov 2018 09:26)  Source: .Blood Blood-Peripheral  Preliminary Report (28 Nov 2018 10:01):    No growth to date.    Culture - Blood (collected 25 Nov 2018 06:23)  Source: .Blood Blood-Peripheral  Preliminary Report (26 Nov 2018 07:01):    No growth to date.    Culture - Blood (collected 25 Nov 2018 06:23)  Source: .Blood Blood-Peripheral  Gram Stain (27 Nov 2018 16:34):    Growth in aerobic bottle: Gram Negative Rods    Growth in anaerobic bottle: Gram Negative Rods  Final Report (28 Nov 2018 10:46):    Growth in aerobic and anaerobic bottles: Klebsiella pneumoniae  Organism: Klebsiella pneumoniae (28 Nov 2018 10:46)  Organism: Klebsiella pneumoniae (28 Nov 2018 10:46)      -  Amikacin: S <=16      -  Ampicillin: R >16 These ampicillin results predict results for amoxicillin      -  Ampicillin/Sulbactam: S <=8/4      -  Aztreonam: S <=4      -  Cefepime: S <=4      -  Cefoxitin: S <=8      -  Ceftriaxone: S <=1 Enterobacter, Citrobacter, and Serratia may develop resistance during prolonged therapy      -  Ciprofloxacin: S <=1      -  Ertapenem: S <=1      -  Gentamicin: S <=4      -  Imipenem: S <=1      -  Levofloxacin: S <=2      -  Meropenem: S <=1      -  Piperacillin/Tazobactam: S <=16      -  Tobramycin: S <=4      -  Trimethoprim/Sulfamethoxazole: S <=2/38      Method Type: PATRICIA

## 2018-11-29 NOTE — PROGRESS NOTE ADULT - PROVIDER SPECIALTY LIST ADULT
Gastroenterology
Gastroenterology
Hospitalist
Infectious Disease
Infectious Disease
MICU
Surgery
MICU

## 2018-11-29 NOTE — PROGRESS NOTE ADULT - PROBLEM SELECTOR PLAN 3
plan as above now s/p ERCP stent on antibiotics  -tolerated regular diet   repeat ERCP within 2 months for stent/stone clearance with GI followup  -LFTS improved  -surgery: no plans for inpatient surgery, outpatient f/u
plan as above now s/p ERCP stent on antibiotics  -tolerated regular diet   repeat ERCP within 2 months for stent/stone clearance with GI followup  -LFTS improved  -surgery: no plans for inpatient surgery, outpatient f/u
plan as above now s/p ERCP on antibiotics  -per GI, advance diet, needs repeat ERCP within 2 months for stent/stone clearance with GI followup  -trend LFTS  -surgery: no plans for inpatient surgery

## 2018-11-30 LAB
CULTURE RESULTS: SIGNIFICANT CHANGE UP
SPECIMEN SOURCE: SIGNIFICANT CHANGE UP

## 2018-12-01 LAB
CULTURE RESULTS: SIGNIFICANT CHANGE UP
ORGANISM # SPEC MICROSCOPIC CNT: SIGNIFICANT CHANGE UP
SPECIMEN SOURCE: SIGNIFICANT CHANGE UP

## 2018-12-02 LAB
CULTURE RESULTS: SIGNIFICANT CHANGE UP
SPECIMEN SOURCE: SIGNIFICANT CHANGE UP

## 2018-12-07 ENCOUNTER — APPOINTMENT (OUTPATIENT)
Dept: FAMILY MEDICINE | Facility: CLINIC | Age: 83
End: 2018-12-07
Payer: MEDICARE

## 2018-12-07 VITALS
SYSTOLIC BLOOD PRESSURE: 100 MMHG | WEIGHT: 123 LBS | BODY MASS INDEX: 22.63 KG/M2 | DIASTOLIC BLOOD PRESSURE: 58 MMHG | HEIGHT: 62 IN

## 2018-12-07 DIAGNOSIS — K80.11 CALCULUS OF GALLBLADDER WITH CHRONIC CHOLECYSTITIS WITH OBSTRUCTION: ICD-10-CM

## 2018-12-07 DIAGNOSIS — T17.910A GASTRIC CONTENTS IN RESPIRATORY TRACT, PART UNSPECIFIED CAUSING ASPHYXIATION, INITIAL ENCOUNTER: ICD-10-CM

## 2018-12-07 PROCEDURE — 99495 TRANSJ CARE MGMT MOD F2F 14D: CPT

## 2018-12-07 RX ORDER — CEPHALEXIN 500 MG/1
500 CAPSULE ORAL
Qty: 15 | Refills: 0 | Status: DISCONTINUED | COMMUNITY
Start: 2018-01-25 | End: 2018-12-07

## 2018-12-07 NOTE — HISTORY OF PRESENT ILLNESS
[Admitted on: ___] : The patient was admitted on [unfilled] [Discharged on ___] : discharged on [unfilled] [Discharge Summary] : discharge summary [Discharge Med List] : discharge medication list [Med Reconciliation] : medication reconciliation has been completed [FreeTextEntry2] : Pt. is here for hospital f/u, needs bwk. Admitted for bile duct calculus. Had multiple stones in the gallbladder as well.\par Discharged on Cipro, cholecalciferol, and

## 2018-12-07 NOTE — PHYSICAL EXAM
[Normal] : normoactive bowel sounds, soft and nontender, no hepatosplenomegaly or masses appreciated [Moderate Complexity requires multiple possible diagnoses and/or the management options, moderate complexity of the medical data (tests, etc.) to be reviewed, and moderate risk of significant complications, morbidity, and/or mortality as well as co-morbidi] : Moderate Complexity

## 2018-12-08 LAB
ALBUMIN SERPL ELPH-MCNC: 4 G/DL
ALP BLD-CCNC: 96 U/L
ALT SERPL-CCNC: 16 U/L
ANION GAP SERPL CALC-SCNC: 10 MMOL/L
AST SERPL-CCNC: 14 U/L
BASOPHILS # BLD AUTO: 0.12 K/UL
BASOPHILS NFR BLD AUTO: 2 %
BILIRUB SERPL-MCNC: 0.3 MG/DL
BUN SERPL-MCNC: 10 MG/DL
CALCIUM SERPL-MCNC: 9.2 MG/DL
CHLORIDE SERPL-SCNC: 107 MMOL/L
CHOLEST SERPL-MCNC: 121 MG/DL
CHOLEST/HDLC SERPL: 2.4 RATIO
CO2 SERPL-SCNC: 25 MMOL/L
CREAT SERPL-MCNC: 1.14 MG/DL
EOSINOPHIL # BLD AUTO: 0.23 K/UL
EOSINOPHIL NFR BLD AUTO: 3.8 %
GLUCOSE SERPL-MCNC: 85 MG/DL
HCT VFR BLD CALC: 36.9 %
HDLC SERPL-MCNC: 50 MG/DL
HGB BLD-MCNC: 11.5 G/DL
IMM GRANULOCYTES NFR BLD AUTO: 0.5 %
LDLC SERPL CALC-MCNC: 52 MG/DL
LYMPHOCYTES # BLD AUTO: 1.49 K/UL
LYMPHOCYTES NFR BLD AUTO: 24.5 %
MAN DIFF?: NORMAL
MCHC RBC-ENTMCNC: 29.7 PG
MCHC RBC-ENTMCNC: 31.2 GM/DL
MCV RBC AUTO: 95.3 FL
MONOCYTES # BLD AUTO: 0.43 K/UL
MONOCYTES NFR BLD AUTO: 7.1 %
NEUTROPHILS # BLD AUTO: 3.78 K/UL
NEUTROPHILS NFR BLD AUTO: 62.1 %
PLATELET # BLD AUTO: 468 K/UL
POTASSIUM SERPL-SCNC: 4.7 MMOL/L
PROT SERPL-MCNC: 6.6 G/DL
RBC # BLD: 3.87 M/UL
RBC # FLD: 14.4 %
SODIUM SERPL-SCNC: 142 MMOL/L
TRIGL SERPL-MCNC: 95 MG/DL
WBC # FLD AUTO: 6.08 K/UL

## 2019-01-04 ENCOUNTER — RX RENEWAL (OUTPATIENT)
Age: 84
End: 2019-01-04

## 2019-01-13 ENCOUNTER — RX RENEWAL (OUTPATIENT)
Age: 84
End: 2019-01-13

## 2019-01-14 ENCOUNTER — RX RENEWAL (OUTPATIENT)
Age: 84
End: 2019-01-14

## 2019-01-31 ENCOUNTER — APPOINTMENT (OUTPATIENT)
Dept: GASTROENTEROLOGY | Facility: CLINIC | Age: 84
End: 2019-01-31
Payer: MEDICARE

## 2019-01-31 VITALS
SYSTOLIC BLOOD PRESSURE: 150 MMHG | TEMPERATURE: 97.6 F | HEART RATE: 68 BPM | HEIGHT: 62 IN | OXYGEN SATURATION: 94 % | DIASTOLIC BLOOD PRESSURE: 84 MMHG | WEIGHT: 101 LBS | BODY MASS INDEX: 18.58 KG/M2

## 2019-01-31 DIAGNOSIS — K80.50 CALCULUS OF BILE DUCT W/OUT CHOLANGITIS OR CHOLECYSTITIS W/OUT OBSTRUCTION: ICD-10-CM

## 2019-01-31 PROCEDURE — 99214 OFFICE O/P EST MOD 30 MIN: CPT

## 2019-02-01 NOTE — ASSESSMENT
[FreeTextEntry1] : Impression:\par \par #1.  Choledocholithiasis, extensive, status post ERCP with biliary stent in late November 2018.\par \par #2.  Paroxysmal atrial fibrillation\par \par #3.  CAD\par \par Recommendation:\par \par #1.  ERCP.   Risks, benefits, alternatives discussed.  Patient and family were warned that this might take more than one ERCP for clearance due to extensive choledocholithiasis. They agree to proceed and want to schedule this in early March 2019.\par \par #2.  Presurgical testing appointment.\par \par

## 2019-02-01 NOTE — PHYSICAL EXAM
[General Appearance - Alert] : alert [General Appearance - In No Acute Distress] : in no acute distress [Sclera] : the sclera and conjunctiva were normal [Oropharynx] : the oropharynx was normal [Auscultation Breath Sounds / Voice Sounds] : lungs were clear to auscultation bilaterally [Heart Rate And Rhythm] : heart rate was normal and rhythm regular [Bowel Sounds] : normal bowel sounds [Abdomen Soft] : soft [Abdomen Tenderness] : non-tender [] : no hepato-splenomegaly [Abdomen Mass (___ Cm)] : no abdominal mass palpated [Abnormal Walk] : normal gait [FreeTextEntry1] : No confusion [Affect] : the affect was normal

## 2019-02-01 NOTE — CONSULT LETTER
[Dear  ___] : Dear  [unfilled], [Consult Letter:] : I had the pleasure of evaluating your patient, [unfilled]. [Please see my note below.] : Please see my note below. [Consult Closing:] : Thank you very much for allowing me to participate in the care of this patient.  If you have any questions, please do not hesitate to contact me. [Sincerely,] : Sincerely, [FreeTextEntry3] : Manuel Grimes MD, MPH, FASGE\par Chief of Clinical Quality in Gastroenterology, [facility]\par Director of Endoscopic Ultrasound, [facility]

## 2019-02-01 NOTE — HISTORY OF PRESENT ILLNESS
[FreeTextEntry1] : Patient accompanied to office visit by son, and other family members.\par \par Patient follows up for hospitalization in November 2018 for choledocholithiasis with cholangitis and Klebsiella bacteremia. Patient underwent ERCP with small sphincterotomy and biliary stenting. Patient did well and went home. Patient denies fevers, chills, sweats, abdominal pain, nausea, vomiting, melena, diarrhea, jaundice,.  She has lost some weight on a low-fat diet.\par \par MRCP 11/24/18:\par LOWER CHEST: Trace bilateral pleural effusions with associated \par compressive atelectasis.\par \par LIVER: Within normal limits.        \par BILE DUCTS: The common bile duct is distended to 1.0 cm and filled with \par tiny stones from the proximal portion to the ampulla (15, 28).\par GALLBLADDER: Numerous small layering gallstones.\par SPLEEN: Within normal limits.\par PANCREAS: Poorly characterized due to motion artifacts. Trace fluid in \par the left paracolic gutter and adjacent to the tail of the pancreas \par consistent with known pancreatitis.\par ADRENALS: Poorly characterized due to motion artifacts.\par KIDNEYS/URETERS: Within normal limits.\par \par VISUALIZED PORTIONS:\par \par BOWEL: Periampullary duodenal diverticulum. \par PERITONEUM: Trace ascites in the upper abdomen.\par VESSELS: Within normal limits.\par RETROPERITONEUM: No lymphadenopathy.  \par ABDOMINAL WALL: Within normal limits.\par BONES: Unchanged T12 and L3 compression deformities.\par \par IMPRESSION:\par Motion limited examination.\par \par Extensive choledocholithiasis. Distention of the common bile duct to 1.0 \par cm.\par \par Numerous small layering gallstones in the gallbladder.\par \par Pancreas poorly characterized due to motion artifacts.\par \par EGD/ERCP 11/25/18\par      Normal esophagus, stomach, and duodenum to the second portion. Due to \par      patient kyphosis, an 0.035 wire was placed into the stomach to assist in passage of the \par      duodenoscope\par      Extensive choledocholithiasis was found in a dilated common bile duct/common hepatic duct. \par      Small biliary sphincterotomy performed, and 10 Fr x 7 cm biliary stent was inserted.\par \par

## 2019-02-25 ENCOUNTER — RX RENEWAL (OUTPATIENT)
Age: 84
End: 2019-02-25

## 2019-02-26 ENCOUNTER — RX RENEWAL (OUTPATIENT)
Age: 84
End: 2019-02-26

## 2019-03-07 ENCOUNTER — OUTPATIENT (OUTPATIENT)
Dept: OUTPATIENT SERVICES | Facility: HOSPITAL | Age: 84
LOS: 1 days | End: 2019-03-07
Payer: MEDICARE

## 2019-03-07 VITALS
OXYGEN SATURATION: 97 % | SYSTOLIC BLOOD PRESSURE: 132 MMHG | HEART RATE: 73 BPM | RESPIRATION RATE: 16 BRPM | DIASTOLIC BLOOD PRESSURE: 76 MMHG | HEIGHT: 55 IN | WEIGHT: 100.09 LBS | TEMPERATURE: 98 F

## 2019-03-07 DIAGNOSIS — K80.30 CALCULUS OF BILE DUCT WITH CHOLANGITIS, UNSPECIFIED, WITHOUT OBSTRUCTION: ICD-10-CM

## 2019-03-07 DIAGNOSIS — K86.0 ALCOHOL-INDUCED CHRONIC PANCREATITIS: ICD-10-CM

## 2019-03-07 DIAGNOSIS — Z98.890 OTHER SPECIFIED POSTPROCEDURAL STATES: Chronic | ICD-10-CM

## 2019-03-07 DIAGNOSIS — S69.90XA UNSPECIFIED INJURY OF UNSPECIFIED WRIST, HAND AND FINGER(S), INITIAL ENCOUNTER: Chronic | ICD-10-CM

## 2019-03-07 DIAGNOSIS — Z96.643 PRESENCE OF ARTIFICIAL HIP JOINT, BILATERAL: Chronic | ICD-10-CM

## 2019-03-07 DIAGNOSIS — K86.9 DISEASE OF PANCREAS, UNSPECIFIED: ICD-10-CM

## 2019-03-07 DIAGNOSIS — I10 ESSENTIAL (PRIMARY) HYPERTENSION: ICD-10-CM

## 2019-03-07 DIAGNOSIS — I25.10 ATHEROSCLEROTIC HEART DISEASE OF NATIVE CORONARY ARTERY WITHOUT ANGINA PECTORIS: ICD-10-CM

## 2019-03-07 DIAGNOSIS — Z01.818 ENCOUNTER FOR OTHER PREPROCEDURAL EXAMINATION: ICD-10-CM

## 2019-03-07 LAB
ALBUMIN SERPL ELPH-MCNC: 3.9 G/DL — SIGNIFICANT CHANGE UP (ref 3.3–5)
ALP SERPL-CCNC: 93 U/L — SIGNIFICANT CHANGE UP (ref 40–120)
ALT FLD-CCNC: 12 U/L — SIGNIFICANT CHANGE UP (ref 10–45)
ANION GAP SERPL CALC-SCNC: 13 MMOL/L — SIGNIFICANT CHANGE UP (ref 5–17)
AST SERPL-CCNC: 14 U/L — SIGNIFICANT CHANGE UP (ref 10–40)
BILIRUB SERPL-MCNC: 0.2 MG/DL — SIGNIFICANT CHANGE UP (ref 0.2–1.2)
BUN SERPL-MCNC: 18 MG/DL — SIGNIFICANT CHANGE UP (ref 7–23)
CALCIUM SERPL-MCNC: 9.3 MG/DL — SIGNIFICANT CHANGE UP (ref 8.4–10.5)
CHLORIDE SERPL-SCNC: 104 MMOL/L — SIGNIFICANT CHANGE UP (ref 96–108)
CO2 SERPL-SCNC: 24 MMOL/L — SIGNIFICANT CHANGE UP (ref 22–31)
CREAT SERPL-MCNC: 1.07 MG/DL — SIGNIFICANT CHANGE UP (ref 0.5–1.3)
GLUCOSE SERPL-MCNC: 90 MG/DL — SIGNIFICANT CHANGE UP (ref 70–99)
HCT VFR BLD CALC: 39.1 % — SIGNIFICANT CHANGE UP (ref 34.5–45)
HGB BLD-MCNC: 11.8 G/DL — SIGNIFICANT CHANGE UP (ref 11.5–15.5)
MCHC RBC-ENTMCNC: 28.8 PG — SIGNIFICANT CHANGE UP (ref 27–34)
MCHC RBC-ENTMCNC: 30.2 GM/DL — LOW (ref 32–36)
MCV RBC AUTO: 95.4 FL — SIGNIFICANT CHANGE UP (ref 80–100)
PLATELET # BLD AUTO: 303 K/UL — SIGNIFICANT CHANGE UP (ref 150–400)
POTASSIUM SERPL-MCNC: 4.6 MMOL/L — SIGNIFICANT CHANGE UP (ref 3.5–5.3)
POTASSIUM SERPL-SCNC: 4.6 MMOL/L — SIGNIFICANT CHANGE UP (ref 3.5–5.3)
PROT SERPL-MCNC: 6.9 G/DL — SIGNIFICANT CHANGE UP (ref 6–8.3)
RBC # BLD: 4.1 M/UL — SIGNIFICANT CHANGE UP (ref 3.8–5.2)
RBC # FLD: 13.2 % — SIGNIFICANT CHANGE UP (ref 10.3–14.5)
SODIUM SERPL-SCNC: 141 MMOL/L — SIGNIFICANT CHANGE UP (ref 135–145)
WBC # BLD: 6.73 K/UL — SIGNIFICANT CHANGE UP (ref 3.8–10.5)
WBC # FLD AUTO: 6.73 K/UL — SIGNIFICANT CHANGE UP (ref 3.8–10.5)

## 2019-03-07 PROCEDURE — G0463: CPT

## 2019-03-07 PROCEDURE — 80053 COMPREHEN METABOLIC PANEL: CPT

## 2019-03-07 PROCEDURE — 85027 COMPLETE CBC AUTOMATED: CPT

## 2019-03-07 RX ORDER — ASPIRIN/CALCIUM CARB/MAGNESIUM 324 MG
1 TABLET ORAL
Qty: 0 | Refills: 0 | COMMUNITY

## 2019-03-07 NOTE — H&P PST ADULT - PROBLEM SELECTOR PLAN 1
ERCP ANES  Check labs, EKG is in sunrise, Echo in chart ERCP ANES  Check labs, EKG is in sunrise, Echo in chart  Son reports pt already had M/eval by PMD, call for note

## 2019-03-07 NOTE — H&P PST ADULT - PSH
No significant past surgical history H/O bilateral hip replacements  2010 , 2016  H/O cardiac catheterization  Daughter reports it was years ago , no intervention required  History of shoulder surgery  b/l  S/P ERCP  11/2018  Wrist injury  b/l wrist surgery

## 2019-03-07 NOTE — H&P PST ADULT - PMH
Depression    HTN (hypertension) Aortic valve stenosis, moderate    Calculus of bile duct with cholangitis without obstruction    Common bile duct stone    Coronary artery disease involving native coronary artery of native heart without angina pectoris    Depression    Depression    DVT (deep venous thrombosis)  2016 right leg  HLD (hyperlipidemia)    HTN (hypertension)    Left bundle branch block    LVH (left ventricular hypertrophy)    MI, acute, non ST segment elevation  Family denies, (information in allscripts)  Mild dementia    MR (mitral regurgitation)    OA (osteoarthritis)    Pancreas disorder Aortic valve stenosis, moderate    Calculus of bile duct with cholangitis without obstruction    Common bile duct stone    Coronary artery disease involving native coronary artery of native heart without angina pectoris    Depression    Depression    DVT (deep venous thrombosis)  2016 right leg  HLD (hyperlipidemia)    HTN (hypertension)    Left bundle branch block    LVH (left ventricular hypertrophy)    MI, acute, non ST segment elevation  Family denies, (information in allscripts)  Mild dementia    MR (mitral regurgitation)    OA (osteoarthritis)    Pancreas disorder    Paroxysmal atrial fibrillation

## 2019-03-07 NOTE — H&P PST ADULT - SOURCE OF INFORMATION, PROFILE
Son Mata, daughter Alvina/patient/family Son Mata, daughter Alvina/family/patient/chart(s)/physician office

## 2019-03-07 NOTE — H&P PST ADULT - HISTORY OF PRESENT ILLNESS
89 y.o female with known hx of depression and HTN,  Pt was transferred from  [facility] to [facility] 11/24/19 for acute onset of fever, chill, abdominal pain with episodes of nausea/vomiting. The pain was located in the RUQ and epigastric region. 89 y.o female with known hx of depression, CAD, HLD,  HTN, dementia,  Pt was transferred from  [facility] to [facility] 11/24/19 for Choledocholithiasis with cholangitis and Klebsiella bacteremia.  Pt had acute onset of fever, chills, abdominal pain with episodes of nausea/vomiting. The pain was located in the RUQ and epigastric region. S/P ERCP with small sphincterotomy and billary stenting. Pt denies any fever , chills , abdominal pain today. Presents for ERCP ANES 3/20/19. 89 y.o female with known hx of depression, Paroxymal Afib, CAD, HLD,  HTN, dementia,  Pt was transferred from  [facility] to [facility] 11/24/19 for Choledocholithiasis with cholangitis and Klebsiella bacteremia.  Pt had acute onset of fever, chills, abdominal pain with episodes of nausea/vomiting. The pain was located in the RUQ and epigastric region. S/P ERCP with small sphincterotomy and billary stenting. Pt denies any fever , chills , abdominal pain today. Presents for ERCP ANES 3/20/19.

## 2019-03-20 ENCOUNTER — APPOINTMENT (OUTPATIENT)
Dept: GASTROENTEROLOGY | Facility: HOSPITAL | Age: 84
End: 2019-03-20

## 2019-03-20 ENCOUNTER — RESULT REVIEW (OUTPATIENT)
Age: 84
End: 2019-03-20

## 2019-03-20 ENCOUNTER — OUTPATIENT (OUTPATIENT)
Dept: OUTPATIENT SERVICES | Facility: HOSPITAL | Age: 84
LOS: 1 days | End: 2019-03-20
Payer: MEDICARE

## 2019-03-20 DIAGNOSIS — K80.30 CALCULUS OF BILE DUCT WITH CHOLANGITIS, UNSPECIFIED, WITHOUT OBSTRUCTION: ICD-10-CM

## 2019-03-20 DIAGNOSIS — Z98.890 OTHER SPECIFIED POSTPROCEDURAL STATES: Chronic | ICD-10-CM

## 2019-03-20 DIAGNOSIS — S69.90XA UNSPECIFIED INJURY OF UNSPECIFIED WRIST, HAND AND FINGER(S), INITIAL ENCOUNTER: Chronic | ICD-10-CM

## 2019-03-20 DIAGNOSIS — Z96.643 PRESENCE OF ARTIFICIAL HIP JOINT, BILATERAL: Chronic | ICD-10-CM

## 2019-03-20 DIAGNOSIS — K86.1 OTHER CHRONIC PANCREATITIS: ICD-10-CM

## 2019-03-20 DIAGNOSIS — K86.9 DISEASE OF PANCREAS, UNSPECIFIED: ICD-10-CM

## 2019-03-20 PROCEDURE — 43262 ENDO CHOLANGIOPANCREATOGRAPH: CPT

## 2019-03-20 PROCEDURE — 43262 ENDO CHOLANGIOPANCREATOGRAPH: CPT | Mod: GC,59

## 2019-03-20 PROCEDURE — 43275 ERCP REMOVE FORGN BODY DUCT: CPT

## 2019-03-20 PROCEDURE — 43261 ENDO CHOLANGIOPANCREATOGRAPH: CPT

## 2019-03-20 PROCEDURE — C1889: CPT

## 2019-03-20 PROCEDURE — 43264 ERCP REMOVE DUCT CALCULI: CPT | Mod: GC

## 2019-03-20 PROCEDURE — 43275 ERCP REMOVE FORGN BODY DUCT: CPT | Mod: GC,59

## 2019-03-20 PROCEDURE — 88342 IMHCHEM/IMCYTCHM 1ST ANTB: CPT | Mod: 26

## 2019-03-20 PROCEDURE — 88305 TISSUE EXAM BY PATHOLOGIST: CPT | Mod: 26

## 2019-03-20 PROCEDURE — 88341 IMHCHEM/IMCYTCHM EA ADD ANTB: CPT

## 2019-03-20 PROCEDURE — 88342 IMHCHEM/IMCYTCHM 1ST ANTB: CPT

## 2019-03-20 PROCEDURE — 88312 SPECIAL STAINS GROUP 1: CPT | Mod: 26

## 2019-03-20 PROCEDURE — 88341 IMHCHEM/IMCYTCHM EA ADD ANTB: CPT | Mod: 26

## 2019-03-20 PROCEDURE — 88305 TISSUE EXAM BY PATHOLOGIST: CPT

## 2019-03-20 PROCEDURE — 88312 SPECIAL STAINS GROUP 1: CPT

## 2019-03-20 PROCEDURE — 43239 EGD BIOPSY SINGLE/MULTIPLE: CPT | Mod: GC,59

## 2019-03-20 PROCEDURE — 74330 X-RAY BILE/PANC ENDOSCOPY: CPT

## 2019-03-20 PROCEDURE — C1769: CPT

## 2019-03-22 ENCOUNTER — RX RENEWAL (OUTPATIENT)
Age: 84
End: 2019-03-22

## 2019-03-26 LAB — SURGICAL PATHOLOGY STUDY: SIGNIFICANT CHANGE UP

## 2019-04-04 PROBLEM — I25.10 ATHEROSCLEROTIC HEART DISEASE OF NATIVE CORONARY ARTERY WITHOUT ANGINA PECTORIS: Chronic | Status: ACTIVE | Noted: 2019-03-07

## 2019-04-04 PROBLEM — K86.9 DISEASE OF PANCREAS, UNSPECIFIED: Chronic | Status: ACTIVE | Noted: 2019-03-07

## 2019-04-04 PROBLEM — K80.30: Chronic | Status: ACTIVE | Noted: 2019-03-07

## 2019-04-04 PROBLEM — I48.0 PAROXYSMAL ATRIAL FIBRILLATION: Chronic | Status: ACTIVE | Noted: 2019-03-07

## 2019-04-04 PROBLEM — K80.50 CALCULUS OF BILE DUCT WITHOUT CHOLANGITIS OR CHOLECYSTITIS WITHOUT OBSTRUCTION: Chronic | Status: ACTIVE | Noted: 2019-03-07

## 2019-04-04 PROBLEM — I44.7 LEFT BUNDLE-BRANCH BLOCK, UNSPECIFIED: Chronic | Status: ACTIVE | Noted: 2019-03-07

## 2019-04-04 PROBLEM — F32.9 MAJOR DEPRESSIVE DISORDER, SINGLE EPISODE, UNSPECIFIED: Chronic | Status: ACTIVE | Noted: 2019-03-07

## 2019-04-04 PROBLEM — I21.4 NON-ST ELEVATION (NSTEMI) MYOCARDIAL INFARCTION: Chronic | Status: ACTIVE | Noted: 2019-03-07

## 2019-04-04 PROBLEM — E78.5 HYPERLIPIDEMIA, UNSPECIFIED: Chronic | Status: ACTIVE | Noted: 2019-03-07

## 2019-04-04 PROBLEM — I35.0 NONRHEUMATIC AORTIC (VALVE) STENOSIS: Chronic | Status: ACTIVE | Noted: 2019-03-07

## 2019-04-04 PROBLEM — I51.7 CARDIOMEGALY: Chronic | Status: ACTIVE | Noted: 2019-03-07

## 2019-04-04 PROBLEM — M19.90 UNSPECIFIED OSTEOARTHRITIS, UNSPECIFIED SITE: Chronic | Status: ACTIVE | Noted: 2019-03-07

## 2019-04-04 PROBLEM — I34.0 NONRHEUMATIC MITRAL (VALVE) INSUFFICIENCY: Chronic | Status: ACTIVE | Noted: 2019-03-07

## 2019-04-04 PROBLEM — F03.90 UNSPECIFIED DEMENTIA WITHOUT BEHAVIORAL DISTURBANCE: Chronic | Status: ACTIVE | Noted: 2019-03-07

## 2019-04-04 PROBLEM — I82.409 ACUTE EMBOLISM AND THROMBOSIS OF UNSPECIFIED DEEP VEINS OF UNSPECIFIED LOWER EXTREMITY: Chronic | Status: ACTIVE | Noted: 2019-03-07

## 2019-04-12 ENCOUNTER — RX RENEWAL (OUTPATIENT)
Age: 84
End: 2019-04-12

## 2019-05-02 ENCOUNTER — APPOINTMENT (OUTPATIENT)
Dept: FAMILY MEDICINE | Facility: CLINIC | Age: 84
End: 2019-05-02
Payer: MEDICARE

## 2019-05-02 VITALS
BODY MASS INDEX: 18.58 KG/M2 | DIASTOLIC BLOOD PRESSURE: 70 MMHG | HEIGHT: 62 IN | WEIGHT: 101 LBS | SYSTOLIC BLOOD PRESSURE: 110 MMHG

## 2019-05-02 PROCEDURE — 99213 OFFICE O/P EST LOW 20 MIN: CPT

## 2019-05-02 NOTE — PHYSICAL EXAM
[de-identified] : erythematous excoriated macular rash to intertriginous skin under bilateral breasts

## 2019-05-02 NOTE — HISTORY OF PRESENT ILLNESS
[FreeTextEntry8] : Pt. c/o itchy rash under her breasts x 3 months. Has was using Bacitracin and now has been using gold bond powder but is not getting better.

## 2019-05-03 ENCOUNTER — RX RENEWAL (OUTPATIENT)
Age: 84
End: 2019-05-03

## 2019-05-06 ENCOUNTER — RX RENEWAL (OUTPATIENT)
Age: 84
End: 2019-05-06

## 2019-05-16 ENCOUNTER — APPOINTMENT (OUTPATIENT)
Dept: GASTROENTEROLOGY | Facility: CLINIC | Age: 84
End: 2019-05-16
Payer: MEDICARE

## 2019-05-16 VITALS
OXYGEN SATURATION: 96 % | SYSTOLIC BLOOD PRESSURE: 112 MMHG | HEART RATE: 72 BPM | RESPIRATION RATE: 15 BRPM | HEIGHT: 62 IN | DIASTOLIC BLOOD PRESSURE: 60 MMHG | TEMPERATURE: 97.5 F

## 2019-05-16 DIAGNOSIS — K25.9 GASTRIC ULCER, UNSPECIFIED AS ACUTE OR CHRONIC, W/OUT HEMORRHAGE OR PERFORATION: ICD-10-CM

## 2019-05-16 PROCEDURE — 99214 OFFICE O/P EST MOD 30 MIN: CPT

## 2019-05-16 RX ORDER — AMOXICILLIN AND CLAVULANATE POTASSIUM 400; 57 MG/5ML; MG/5ML
400-57 POWDER, FOR SUSPENSION ORAL
Qty: 200 | Refills: 0 | Status: DISCONTINUED | COMMUNITY
Start: 2018-11-18 | End: 2019-05-16

## 2019-05-16 NOTE — HISTORY OF PRESENT ILLNESS
[FreeTextEntry1] : Patient accompanied to office visit by son, and health aide.\par \par Patient follows up for hospitalization in November 2018 for choledocholithiasis with cholangitis and Klebsiella bacteremia. Patient underwent ERCP with small sphincterotomy and biliary stenting. Patient did well and went home. Patient denies fevers, chills, sweats, abdominal pain, nausea, vomiting, melena, diarrhea, jaundice.  She has lost some weight on a low-fat diet.\par \par Had repeat ERCP on 3/20/19:\par Impression:          - Removal of previously placed biliary stent\par                      - Extensive choledocholithiasis in a dilated common bile duct, with complete \par                      extraction by sphincterotomy and balloon sweeps.\par                      - Cratered gastric ulcer in fundus. Biopsied.\par                      - Superficial abrasion in 2nd portion of duodenum. MR conditional hemoclip \par                      placed to reduce risk of delayed bleeding.\par \par Pathology:\par 1. Gastric fundus, ulcer, biopsy:\par - Fungal profiles consistent candida in ulcerated gastric\par mucosa with fibrinpurulent material.\par - Negative for CMV (immunohistochemical stain).\par - Negative for adenovirus (immunohistochemical stain).\par - Negative for Helicobacter pylori (special stain).\par \par She continues to do well, without GI symptoms of fever, abdominal pain, nausea, vomiting, melena, diarrhea, jaundice.\par \par She is taking pantoprazole since time of last EGD/ERCP.

## 2019-05-16 NOTE — PHYSICAL EXAM
[General Appearance - Alert] : alert [General Appearance - In No Acute Distress] : in no acute distress [Sclera] : the sclera and conjunctiva were normal [Bowel Sounds] : normal bowel sounds [Oropharynx] : the oropharynx was normal [Abdomen Soft] : soft [Abdomen Tenderness] : non-tender [] : no hepato-splenomegaly [Abdomen Mass (___ Cm)] : no abdominal mass palpated [FreeTextEntry1] : No confusion [Affect] : the affect was normal

## 2019-05-16 NOTE — ASSESSMENT
[FreeTextEntry1] : Impression:\par \par #1. Choledocholithiasis, extensive, status post ERCP with biliary stent in late November 2018, now s/p stent removal and stone clearance by repeat ERCP March 2019.  Pt with intact gallbladder with cholelithiasis.\par \par #2. Gastric ulcer, biopsies demonstrated Candidial colonization but no cancerous cells.\par \par #3.  Paroxysmal atrial fibrillation\par \par #4. CAD\par \par Recommendation:\par \par #1. Would leave gallbladder in place due to advanced age.\par \par #2. Patient was given the option of upper endoscopy for surveillance of gastric ulcer. She declined this, and is aware of small risk of missing stomach cancer\par \par #3. Continue low-dose pantoprazole\par \par #4.  Followup PRN.

## 2019-05-16 NOTE — CONSULT LETTER
[Consult Letter:] : I had the pleasure of evaluating your patient, [unfilled]. [Dear  ___] : Dear  [unfilled], [Sincerely,] : Sincerely, [Please see my note below.] : Please see my note below. [Consult Closing:] : Thank you very much for allowing me to participate in the care of this patient.  If you have any questions, please do not hesitate to contact me. [FreeTextEntry3] : Manuel Grimes MD, MPH, MARTIN\par Chief of Clinical Quality in Gastroenterology, [facility]\par Director of Endoscopic Ultrasound, [facility]\par Office Phone (24 hours) [phone]\par Office Phone 9 AM-5 PM weekdays [phone]\par

## 2019-05-24 ENCOUNTER — APPOINTMENT (OUTPATIENT)
Dept: CARDIOLOGY | Facility: CLINIC | Age: 84
End: 2019-05-24
Payer: MEDICARE

## 2019-05-24 ENCOUNTER — NON-APPOINTMENT (OUTPATIENT)
Age: 84
End: 2019-05-24

## 2019-05-24 VITALS
HEIGHT: 60 IN | SYSTOLIC BLOOD PRESSURE: 110 MMHG | HEART RATE: 103 BPM | BODY MASS INDEX: 20.81 KG/M2 | WEIGHT: 106 LBS | DIASTOLIC BLOOD PRESSURE: 60 MMHG | OXYGEN SATURATION: 96 %

## 2019-05-24 DIAGNOSIS — J06.9 ACUTE UPPER RESPIRATORY INFECTION, UNSPECIFIED: ICD-10-CM

## 2019-05-24 PROCEDURE — 93000 ELECTROCARDIOGRAM COMPLETE: CPT

## 2019-05-24 PROCEDURE — 99214 OFFICE O/P EST MOD 30 MIN: CPT | Mod: 25

## 2019-05-24 NOTE — PHYSICAL EXAM
[General Appearance - In No Acute Distress] : no acute distress [Well Groomed] : well groomed [Eyelids - No Xanthelasma] : the eyelids demonstrated no xanthelasmas [Respiration, Rhythm And Depth] : normal respiratory rhythm and effort [Auscultation Breath Sounds / Voice Sounds] : lungs were clear to auscultation bilaterally [Heart Rate And Rhythm] : heart rate and rhythm were normal [Bowel Sounds] : normal bowel sounds [Abdomen Soft] : soft [FreeTextEntry1] : Walks with a walker [Cyanosis, Localized] : no localized cyanosis [Nail Clubbing] : no clubbing of the fingernails [] : no rash [Oriented To Time, Place, And Person] : oriented to person, place, and time [Affect] : the affect was normal [Mood] : the mood was normal

## 2019-05-24 NOTE — REVIEW OF SYSTEMS
[Feeling Fatigued] : feeling fatigued [see HPI] : see HPI [Dyspnea on exertion] : dyspnea during exertion [Chest Pain] : no chest pain [Lower Ext Edema] : no extremity edema [Palpitations] : no palpitations

## 2019-05-24 NOTE — DISCUSSION/SUMMARY
[FreeTextEntry1] : \par Shortness of breath: Symptoms are sporadic (and not present today); no significant CAD on angiography several years ago but there is a history of aortic valve insufficiency -- I recommend echocardiography to assess for interval progression of valve disease.\par \par Coronary artery disease: Mild, nonobstructive disease; no angina.\par \par Left bundle-branch block: Chronic.

## 2019-05-24 NOTE — HISTORY OF PRESENT ILLNESS
[FreeTextEntry1] : Lary Fulton is a 89-year-old woman with a history of mild aortic/tricuspid regurgitation, left ventricular diastolic dysfunction, left bundle branch block on ECG, hypertension, hyperlipidemia, generalized anxiety disorder, lower extremity edema, coronary artery disease, osteoarthritis, and mild dementia who returns for cardiac examination after a long absence - she was last seen in our office by by my associate in December 2016.  Mrs. Fulton says that she feels well and honestly doesn't know why she was asked to see me today.  Her son, Mata, and her home health aide described intermittent exertional dyspnea; symptoms are inconsistent -- some days she experiences shortness of breath and fatigue and has to stop ambulating / other day she is able to walk quite a distance without difficulty.  There is no associated angina.

## 2019-05-24 NOTE — REASON FOR VISIT
[Follow-Up - Clinic] : a clinic follow-up of [Dyspnea] : dyspnea [FreeTextEntry1] : Aortic insufficiency [Other: _____] : [unfilled] [Formal Caregiver] : formal caregiver

## 2019-05-30 ENCOUNTER — RX RENEWAL (OUTPATIENT)
Age: 84
End: 2019-05-30

## 2019-05-30 RX ORDER — BUSPIRONE HYDROCHLORIDE 30 MG/1
30 TABLET ORAL TWICE DAILY
Qty: 180 | Refills: 0 | Status: ACTIVE | COMMUNITY
Start: 2017-11-14 | End: 1900-01-01

## 2019-06-05 ENCOUNTER — APPOINTMENT (OUTPATIENT)
Dept: FAMILY MEDICINE | Facility: CLINIC | Age: 84
End: 2019-06-05
Payer: MEDICARE

## 2019-06-05 VITALS
SYSTOLIC BLOOD PRESSURE: 98 MMHG | HEIGHT: 60 IN | DIASTOLIC BLOOD PRESSURE: 60 MMHG | BODY MASS INDEX: 20.81 KG/M2 | WEIGHT: 106 LBS

## 2019-06-05 DIAGNOSIS — R44.1 VISUAL HALLUCINATIONS: ICD-10-CM

## 2019-06-05 PROCEDURE — 99214 OFFICE O/P EST MOD 30 MIN: CPT

## 2019-06-06 PROBLEM — R44.1 VISUAL HALLUCINATIONS: Status: ACTIVE | Noted: 2018-01-12

## 2019-06-06 NOTE — PLAN
[FreeTextEntry1] : Extensive counseling with patient, daughter and caretaker. Improved observation. Safety precautions in home. May need additional assistance or supervision at night. Offered letter of need.

## 2019-06-06 NOTE — COUNSELING
[Healthy eating counseling provided] : healthy eating [Activity counseling provided] : activity [Engage in a relaxing activity] : Engage in a relaxing activity [Behavioral health counseling provided] : behavioral health  [Fall prevention counseling provided] : fall prevention  [Plan in advance] : Plan in advance [Adequate lighting] : Adequate lighting [No throw rugs] : No throw rugs [Use proper foot wear] : Use proper foot wear [None] : None [Good understanding] : Patient has a good understanding of lifestyle changes and the steps needed to achieve self management goals

## 2019-06-06 NOTE — HISTORY OF PRESENT ILLNESS
[FreeTextEntry8] : Pt. c/o insomnia, also, pt's daughter claims pt. sees people and is confused with day and night, she is not aware of the time.\par seems to occur at night. No auditory halucinations only visual

## 2019-06-24 ENCOUNTER — RX RENEWAL (OUTPATIENT)
Age: 84
End: 2019-06-24

## 2019-06-27 ENCOUNTER — APPOINTMENT (OUTPATIENT)
Dept: CARDIOLOGY | Facility: CLINIC | Age: 84
End: 2019-06-27
Payer: MEDICARE

## 2019-06-27 PROCEDURE — 93306 TTE W/DOPPLER COMPLETE: CPT

## 2019-07-23 ENCOUNTER — RX RENEWAL (OUTPATIENT)
Age: 84
End: 2019-07-23

## 2019-08-08 ENCOUNTER — APPOINTMENT (OUTPATIENT)
Dept: FAMILY MEDICINE | Facility: CLINIC | Age: 84
End: 2019-08-08
Payer: MEDICARE

## 2019-08-08 VITALS
WEIGHT: 106 LBS | HEIGHT: 60 IN | DIASTOLIC BLOOD PRESSURE: 64 MMHG | BODY MASS INDEX: 20.81 KG/M2 | SYSTOLIC BLOOD PRESSURE: 100 MMHG

## 2019-08-08 PROCEDURE — 99213 OFFICE O/P EST LOW 20 MIN: CPT

## 2019-08-08 NOTE — PHYSICAL EXAM
[Normal] : soft, non-tender, non-distended, no masses palpated, no HSM and normal bowel sounds [No Joint Swelling] : no joint swelling [Grossly Normal Strength/Tone] : grossly normal strength/tone [de-identified] : FROM of hips and knees. No point tenderness

## 2019-09-30 ENCOUNTER — RX RENEWAL (OUTPATIENT)
Age: 84
End: 2019-09-30

## 2019-10-02 ENCOUNTER — RX RENEWAL (OUTPATIENT)
Age: 84
End: 2019-10-02

## 2019-10-04 ENCOUNTER — RX RENEWAL (OUTPATIENT)
Age: 84
End: 2019-10-04

## 2019-10-04 ENCOUNTER — MEDICATION RENEWAL (OUTPATIENT)
Age: 84
End: 2019-10-04

## 2019-10-21 NOTE — ED PROVIDER NOTE - CPE EDP NEURO NORM
normal...
Breathing spontaneous and unlabored. Breath sounds clear and equal bilaterally with regular rhythm.

## 2019-10-22 ENCOUNTER — EMERGENCY (EMERGENCY)
Facility: HOSPITAL | Age: 84
LOS: 0 days | Discharge: HOME | End: 2019-10-22
Attending: EMERGENCY MEDICINE
Payer: MEDICARE

## 2019-10-22 VITALS
DIASTOLIC BLOOD PRESSURE: 74 MMHG | OXYGEN SATURATION: 97 % | SYSTOLIC BLOOD PRESSURE: 151 MMHG | TEMPERATURE: 98 F | HEART RATE: 90 BPM | RESPIRATION RATE: 18 BRPM

## 2019-10-22 VITALS
HEART RATE: 81 BPM | TEMPERATURE: 98 F | OXYGEN SATURATION: 99 % | WEIGHT: 110.01 LBS | RESPIRATION RATE: 16 BRPM | HEIGHT: 62 IN | SYSTOLIC BLOOD PRESSURE: 118 MMHG | DIASTOLIC BLOOD PRESSURE: 70 MMHG

## 2019-10-22 DIAGNOSIS — I25.10 ATHEROSCLEROTIC HEART DISEASE OF NATIVE CORONARY ARTERY WITHOUT ANGINA PECTORIS: ICD-10-CM

## 2019-10-22 DIAGNOSIS — I48.0 PAROXYSMAL ATRIAL FIBRILLATION: ICD-10-CM

## 2019-10-22 DIAGNOSIS — I10 ESSENTIAL (PRIMARY) HYPERTENSION: ICD-10-CM

## 2019-10-22 DIAGNOSIS — F03.90 UNSPECIFIED DEMENTIA WITHOUT BEHAVIORAL DISTURBANCE: ICD-10-CM

## 2019-10-22 DIAGNOSIS — Z98.890 OTHER SPECIFIED POSTPROCEDURAL STATES: Chronic | ICD-10-CM

## 2019-10-22 DIAGNOSIS — I35.0 NONRHEUMATIC AORTIC (VALVE) STENOSIS: ICD-10-CM

## 2019-10-22 DIAGNOSIS — R07.9 CHEST PAIN, UNSPECIFIED: ICD-10-CM

## 2019-10-22 DIAGNOSIS — Z90.49 ACQUIRED ABSENCE OF OTHER SPECIFIED PARTS OF DIGESTIVE TRACT: ICD-10-CM

## 2019-10-22 DIAGNOSIS — I25.2 OLD MYOCARDIAL INFARCTION: ICD-10-CM

## 2019-10-22 DIAGNOSIS — B37.2 CANDIDIASIS OF SKIN AND NAIL: ICD-10-CM

## 2019-10-22 DIAGNOSIS — F32.9 MAJOR DEPRESSIVE DISORDER, SINGLE EPISODE, UNSPECIFIED: ICD-10-CM

## 2019-10-22 DIAGNOSIS — Z79.899 OTHER LONG TERM (CURRENT) DRUG THERAPY: ICD-10-CM

## 2019-10-22 DIAGNOSIS — R06.02 SHORTNESS OF BREATH: ICD-10-CM

## 2019-10-22 DIAGNOSIS — I44.7 LEFT BUNDLE-BRANCH BLOCK, UNSPECIFIED: ICD-10-CM

## 2019-10-22 DIAGNOSIS — Z79.01 LONG TERM (CURRENT) USE OF ANTICOAGULANTS: ICD-10-CM

## 2019-10-22 DIAGNOSIS — M19.90 UNSPECIFIED OSTEOARTHRITIS, UNSPECIFIED SITE: ICD-10-CM

## 2019-10-22 DIAGNOSIS — Z87.442 PERSONAL HISTORY OF URINARY CALCULI: ICD-10-CM

## 2019-10-22 DIAGNOSIS — Z86.718 PERSONAL HISTORY OF OTHER VENOUS THROMBOSIS AND EMBOLISM: ICD-10-CM

## 2019-10-22 DIAGNOSIS — S69.90XA UNSPECIFIED INJURY OF UNSPECIFIED WRIST, HAND AND FINGER(S), INITIAL ENCOUNTER: Chronic | ICD-10-CM

## 2019-10-22 DIAGNOSIS — K87 DISORDERS OF GALLBLADDER, BILIARY TRACT AND PANCREAS IN DISEASES CLASSIFIED ELSEWHERE: ICD-10-CM

## 2019-10-22 DIAGNOSIS — Z96.612 PRESENCE OF LEFT ARTIFICIAL SHOULDER JOINT: ICD-10-CM

## 2019-10-22 DIAGNOSIS — I34.0 NONRHEUMATIC MITRAL (VALVE) INSUFFICIENCY: ICD-10-CM

## 2019-10-22 DIAGNOSIS — R07.89 OTHER CHEST PAIN: ICD-10-CM

## 2019-10-22 DIAGNOSIS — Z96.643 PRESENCE OF ARTIFICIAL HIP JOINT, BILATERAL: ICD-10-CM

## 2019-10-22 DIAGNOSIS — Z96.643 PRESENCE OF ARTIFICIAL HIP JOINT, BILATERAL: Chronic | ICD-10-CM

## 2019-10-22 DIAGNOSIS — Z96.611 PRESENCE OF RIGHT ARTIFICIAL SHOULDER JOINT: ICD-10-CM

## 2019-10-22 LAB
ALBUMIN SERPL ELPH-MCNC: 3.4 G/DL — SIGNIFICANT CHANGE UP (ref 3.3–5)
ALP SERPL-CCNC: 102 U/L — SIGNIFICANT CHANGE UP (ref 40–120)
ALT FLD-CCNC: 22 U/L — SIGNIFICANT CHANGE UP (ref 12–78)
ANION GAP SERPL CALC-SCNC: 6 MMOL/L — SIGNIFICANT CHANGE UP (ref 5–17)
APTT BLD: 27.5 SEC — SIGNIFICANT CHANGE UP (ref 27.5–36.3)
AST SERPL-CCNC: 17 U/L — SIGNIFICANT CHANGE UP (ref 15–37)
BILIRUB SERPL-MCNC: 0.3 MG/DL — SIGNIFICANT CHANGE UP (ref 0.2–1.2)
BUN SERPL-MCNC: 19 MG/DL — SIGNIFICANT CHANGE UP (ref 7–23)
CALCIUM SERPL-MCNC: 8.6 MG/DL — SIGNIFICANT CHANGE UP (ref 8.5–10.1)
CHLORIDE SERPL-SCNC: 107 MMOL/L — SIGNIFICANT CHANGE UP (ref 96–108)
CO2 SERPL-SCNC: 26 MMOL/L — SIGNIFICANT CHANGE UP (ref 22–31)
CREAT SERPL-MCNC: 1.36 MG/DL — HIGH (ref 0.5–1.3)
GLUCOSE SERPL-MCNC: 97 MG/DL — SIGNIFICANT CHANGE UP (ref 70–99)
HCT VFR BLD CALC: 37 % — SIGNIFICANT CHANGE UP (ref 34.5–45)
HGB BLD-MCNC: 12 G/DL — SIGNIFICANT CHANGE UP (ref 11.5–15.5)
INR BLD: 1.03 RATIO — SIGNIFICANT CHANGE UP (ref 0.88–1.16)
MAGNESIUM SERPL-MCNC: 2.4 MG/DL — SIGNIFICANT CHANGE UP (ref 1.6–2.6)
MCHC RBC-ENTMCNC: 29.9 PG — SIGNIFICANT CHANGE UP (ref 27–34)
MCHC RBC-ENTMCNC: 32.4 GM/DL — SIGNIFICANT CHANGE UP (ref 32–36)
MCV RBC AUTO: 92.3 FL — SIGNIFICANT CHANGE UP (ref 80–100)
NT-PROBNP SERPL-SCNC: 882 PG/ML — HIGH (ref 0–450)
PLATELET # BLD AUTO: 227 K/UL — SIGNIFICANT CHANGE UP (ref 150–400)
POTASSIUM SERPL-MCNC: 4.5 MMOL/L — SIGNIFICANT CHANGE UP (ref 3.5–5.3)
POTASSIUM SERPL-SCNC: 4.5 MMOL/L — SIGNIFICANT CHANGE UP (ref 3.5–5.3)
PROT SERPL-MCNC: 7.1 GM/DL — SIGNIFICANT CHANGE UP (ref 6–8.3)
PROTHROM AB SERPL-ACNC: 11.4 SEC — SIGNIFICANT CHANGE UP (ref 10–12.9)
RBC # BLD: 4.01 M/UL — SIGNIFICANT CHANGE UP (ref 3.8–5.2)
RBC # FLD: 13.8 % — SIGNIFICANT CHANGE UP (ref 10.3–14.5)
SODIUM SERPL-SCNC: 139 MMOL/L — SIGNIFICANT CHANGE UP (ref 135–145)
TROPONIN I SERPL-MCNC: <0.015 NG/ML — SIGNIFICANT CHANGE UP (ref 0.01–0.04)
TROPONIN I SERPL-MCNC: <0.015 NG/ML — SIGNIFICANT CHANGE UP (ref 0.01–0.04)
WBC # BLD: 6.28 K/UL — SIGNIFICANT CHANGE UP (ref 3.8–10.5)
WBC # FLD AUTO: 6.28 K/UL — SIGNIFICANT CHANGE UP (ref 3.8–10.5)

## 2019-10-22 PROCEDURE — 71045 X-RAY EXAM CHEST 1 VIEW: CPT | Mod: 26

## 2019-10-22 PROCEDURE — 84484 ASSAY OF TROPONIN QUANT: CPT

## 2019-10-22 PROCEDURE — 71045 X-RAY EXAM CHEST 1 VIEW: CPT

## 2019-10-22 PROCEDURE — 85610 PROTHROMBIN TIME: CPT

## 2019-10-22 PROCEDURE — 99284 EMERGENCY DEPT VISIT MOD MDM: CPT

## 2019-10-22 PROCEDURE — 85027 COMPLETE CBC AUTOMATED: CPT

## 2019-10-22 PROCEDURE — 36415 COLL VENOUS BLD VENIPUNCTURE: CPT

## 2019-10-22 PROCEDURE — 99284 EMERGENCY DEPT VISIT MOD MDM: CPT | Mod: 25

## 2019-10-22 PROCEDURE — 80053 COMPREHEN METABOLIC PANEL: CPT

## 2019-10-22 PROCEDURE — 71275 CT ANGIOGRAPHY CHEST: CPT

## 2019-10-22 PROCEDURE — 83735 ASSAY OF MAGNESIUM: CPT

## 2019-10-22 PROCEDURE — 71275 CT ANGIOGRAPHY CHEST: CPT | Mod: 26

## 2019-10-22 PROCEDURE — 83880 ASSAY OF NATRIURETIC PEPTIDE: CPT

## 2019-10-22 PROCEDURE — 93005 ELECTROCARDIOGRAM TRACING: CPT

## 2019-10-22 PROCEDURE — 93010 ELECTROCARDIOGRAM REPORT: CPT

## 2019-10-22 PROCEDURE — 85730 THROMBOPLASTIN TIME PARTIAL: CPT

## 2019-10-22 RX ORDER — ASPIRIN/CALCIUM CARB/MAGNESIUM 324 MG
162 TABLET ORAL ONCE
Refills: 0 | Status: COMPLETED | OUTPATIENT
Start: 2019-10-22 | End: 2019-10-22

## 2019-10-22 NOTE — ED ADULT TRIAGE NOTE - CHIEF COMPLAINT QUOTE
patient brought in by EMS from home for left sided chest pain under the breast x 2 hours that is worse with inspiration. no acute distress noted on arrival. taken into room 4 for EKG.

## 2019-10-22 NOTE — ED PROVIDER NOTE - PSH
H/O bilateral hip replacements  2010 , 2016  H/O cardiac catheterization  Daughter reports it was years ago , no intervention required  History of shoulder surgery  b/l  S/P ERCP  11/2018  Wrist injury  b/l wrist surgery

## 2019-10-22 NOTE — ED PROVIDER NOTE - CONSTITUTIONAL, MLM
normal... Elderly WF, awake, alert, in no apparent distress.  Normal respirations, not acutely ill-appearing.

## 2019-10-22 NOTE — ED PROVIDER NOTE - SKIN, MLM
Skin normal color for race, warm, dry and intact.  + Erythematous papular/fungal rash under B/L breasts, not where pt reports her chest pain.

## 2019-10-22 NOTE — ED PROVIDER NOTE - CARE PLAN
Principal Discharge DX:	Atypical chest pain  Secondary Diagnosis:	Chest wall pain Principal Discharge DX:	Atypical chest pain  Secondary Diagnosis:	Chest wall pain  Secondary Diagnosis:	Candidal intertrigo

## 2019-10-22 NOTE — ED PROVIDER NOTE - CHPI ED SYMPTOMS NEG
no diaphoresis/no back pain/no dizziness/no fever/no nausea/no vomiting/no chills/no cough/no syncope

## 2019-10-22 NOTE — ED PROVIDER NOTE - MUSCULOSKELETAL, MLM
Spine appears normal, range of motion is not limited, no muscle or joint tenderness.  CHOU x 4, no focal extremity swelling/tenderness.  L chest wall + reproducible focal tenderness w/o chest wall deformity.

## 2019-10-22 NOTE — ED ADULT NURSE NOTE - NSIMPLEMENTINTERV_GEN_ALL_ED
Implemented All Fall with Harm Risk Interventions:  [place] to call system. Call bell, personal items and telephone within reach. Instruct patient to call for assistance. Room bathroom lighting operational. Non-slip footwear when patient is off stretcher. Physically safe environment: no spills, clutter or unnecessary equipment. Stretcher in lowest position, wheels locked, appropriate side rails in place. Provide visual cue, wrist band, yellow gown, etc. Monitor gait and stability. Monitor for mental status changes and reorient to person, place, and time. Review medications for side effects contributing to fall risk. Reinforce activity limits and safety measures with patient and family. Provide visual clues: red socks.

## 2019-10-22 NOTE — ED PROVIDER NOTE - PROGRESS NOTE DETAILS
Dr. Hudson:  Pt re-evaluated, only c/o's L chest pain when actively moves, torso movement.  EKG w/o acute abnl., troponin neg. X 2, CTA chest negative.  Pt stable for D/C home, f/u PCP this week, po tylenol.

## 2019-10-22 NOTE — ED PROVIDER NOTE - PATIENT PORTAL LINK FT
You can access the FollowMyHealth Patient Portal offered by [facility] by registering at the following website: [email or web address]. By joining [employer]’s FollowMyHealth portal, you will also be able to view your health information using other applications (apps) compatible with our system.

## 2019-10-22 NOTE — ED PROVIDER NOTE - OBJECTIVE STATEMENT
Pt evaluated 10/22.  ED chart completed 10/28.    89 yo WF, PMH HTN, LBBB, depression. s/p acute cholangitis 11/2018 with assoc. sepsis, BIBA from home c/o'ing chest pain.  Pt reports 2 hours sharp pain L chest, under the L breast, spontaneous gradual onset while at rest, mild severity, + pleuritic but also exacerbated by torso movement.  No recent h/o fall, trauma.  + Initial assocaited SOB, self-resolved PTA.  No cough, F/C, abd pain, N/V, LE pain/swelling.  PCP: S. Gross.

## 2019-10-22 NOTE — ED PROVIDER NOTE - CLINICAL SUMMARY MEDICAL DECISION MAKING FREE TEXT BOX
89 yo WF, h/o LBBB, HTN, BIBA from home reporting sharp chest pain with initial SOB, + pleuritic, + reproducible chest wall tenderness.  Plan: EKG, CXR, labs, incl. serial troponin.  CTA chest, monitor, observe, reassess.

## 2019-10-22 NOTE — ED PROVIDER NOTE - NSFOLLOWUPINSTRUCTIONS_ED_ALL_ED_FT
Tylenol 325 mg 2 tabs every 6 hours as needed for pain.   Balmex cream: apply topically to skin under both breasts so as to clear up uperficial skin fungal rash.  Continnue your own medications as per routine.  follow up this week with Dr. Palmer: call today for appointment.      ED evaluation and management discussed with the patient and family (if available) in detail.  Close PMD follow up encouraged.  Strict ED return instructions discussed in detail and patient given the opportunity to ask any questions about their discharge diagnosis and instructions. Patient verbalized understanding.        Chest Pain    Chest pain can be caused by many different conditions which may or may not be dangerous. Causes include heartburn, lung infections, heart attack, blood clot in lungs, skin infections, strain or damage to muscle, cartilage, or bones, etc. In addition to a history and physical examination, an electrocardiogram (ECG) or other lab tests may have been performed to determine the cause of your chest pain. Follow up with your primary care provider or with a cardiologist as instructed.     SEEK IMMEDIATE MEDICAL CARE IF YOU HAVE ANY OF THE FOLLOWING SYMPTOMS: worsening chest pain, coughing up blood, unexplained back/neck/jaw pain, severe abdominal pain, dizziness or lightheadedness, fainting, shortness of breath, sweaty or clammy skin, vomiting, or racing heart beat. These symptoms may represent a serious problem that is an emergency. Do not wait to see if the symptoms will go away. Get medical help right away. Call 911 and do not drive yourself to the hospital.          Muscle Pain, Adult  Muscle pain (myalgia) may be mild or severe. In most cases, the pain lasts only a short time and it goes away without treatment. It is normal to feel some muscle pain after starting a workout program. Muscles that have not been used often will be sore at first.  Muscle pain may also be caused by many other things, including:  Overuse or muscle strain, especially if you are not in shape. This is the most common cause of muscle pain.Injury.Bruises.Viruses, such as the flu.Infectious diseases.A chronic condition that causes muscle tenderness, fatigue, and headache (fibromyalgia).A condition, such as lupus, in which the body’s disease-fighting system attacks other organs in the body (autoimmune or rheumatologic diseases).Certain drugs, including ACE inhibitors and statins.To diagnose the cause of your muscle pain, your health care provider will do a physical exam and ask questions about the pain and when it began. If you have not had muscle pain for very long, your health care provider may want to wait before doing much testing. If your muscle pain has lasted a long time, your health care provider may want to run tests right away. In some cases, this may include tests to rule out certain conditions or illnesses.  Treatment for muscle pain depends on the cause. Home care is often enough to relieve muscle pain. Your health care provider may also prescribe anti-inflammatory medicine.  Follow these instructions at home:  Activity     If overuse is causing your muscle pain:  Slow down your activities until the pain goes away.Do regular, gentle exercises if you are not usually active.Warm up before exercising. Stretch before and after exercising. This can help lower the risk of muscle pain.Do not continue working out if the pain is very bad. Bad pain could mean that you have injured a muscle.Managing pain and discomfort     Image   If directed, apply ice to the sore muscle:  Put ice in a plastic bag.Place a towel between your skin and the bag.Leave the ice on for 20 minutes, 2–3 times a day.You may also alternate between applying ice and applying heat as told by your health care provider. To apply heat, use the heat source that your health care provider recommends, such as a moist heat pack or a heating pad.  Place a towel between your skin and the heat source.Leave the heat on for 20–30 minutes.Remove the heat if your skin turns bright red. This is especially important if you are unable to feel pain, heat, or cold. You may have a greater risk of getting burned.Medicines     Take over-the-counter and prescription medicines only as told by your health care provider.Do not drive or use heavy machinery while taking prescription pain medicine.Contact a health care provider if:  Your muscle pain gets worse and medicines do not help.You have muscle pain that lasts longer than 3 days.You have a rash or fever along with muscle pain.You have muscle pain after a tick bite.You have muscle pain while working out, even though you are in good physical condition.You have redness, soreness, or swelling along with muscle pain.You have muscle pain after starting a new medicine or changing the dose of a medicine.Get help right away if:  You have trouble breathing.You have trouble swallowing.You have muscle pain along with a stiff neck, fever, and vomiting.You have severe muscle weakness or cannot move part of your body.This information is not intended to replace advice given to you by your health care provider. Make sure you discuss any questions you have with your health care provider. Tylenol 325 mg 2 tabs every 6 hours as needed for pain.   Balmex cream: apply topically to skin under both breasts so as to clear up superficial skin fungal rash.  Continue your own medications as per routine.  follow up this week with Dr. Palmer: call today for appointment.      ED evaluation and management discussed with the patient and family (if available) in detail.  Close PMD follow up encouraged.  Strict ED return instructions discussed in detail and patient given the opportunity to ask any questions about their discharge diagnosis and instructions. Patient verbalized understanding.        Chest Pain    Chest pain can be caused by many different conditions which may or may not be dangerous. Causes include heartburn, lung infections, heart attack, blood clot in lungs, skin infections, strain or damage to muscle, cartilage, or bones, etc. In addition to a history and physical examination, an electrocardiogram (ECG) or other lab tests may have been performed to determine the cause of your chest pain. Follow up with your primary care provider or with a cardiologist as instructed.     SEEK IMMEDIATE MEDICAL CARE IF YOU HAVE ANY OF THE FOLLOWING SYMPTOMS: worsening chest pain, coughing up blood, unexplained back/neck/jaw pain, severe abdominal pain, dizziness or lightheadedness, fainting, shortness of breath, sweaty or clammy skin, vomiting, or racing heart beat. These symptoms may represent a serious problem that is an emergency. Do not wait to see if the symptoms will go away. Get medical help right away. Call 911 and do not drive yourself to the hospital.          Muscle Pain, Adult  Muscle pain (myalgia) may be mild or severe. In most cases, the pain lasts only a short time and it goes away without treatment. It is normal to feel some muscle pain after starting a workout program. Muscles that have not been used often will be sore at first.  Muscle pain may also be caused by many other things, including:  Overuse or muscle strain, especially if you are not in shape. This is the most common cause of muscle pain.Injury.Bruises.Viruses, such as the flu.Infectious diseases.A chronic condition that causes muscle tenderness, fatigue, and headache (fibromyalgia).A condition, such as lupus, in which the body’s disease-fighting system attacks other organs in the body (autoimmune or rheumatologic diseases).Certain drugs, including ACE inhibitors and statins.To diagnose the cause of your muscle pain, your health care provider will do a physical exam and ask questions about the pain and when it began. If you have not had muscle pain for very long, your health care provider may want to wait before doing much testing. If your muscle pain has lasted a long time, your health care provider may want to run tests right away. In some cases, this may include tests to rule out certain conditions or illnesses.  Treatment for muscle pain depends on the cause. Home care is often enough to relieve muscle pain. Your health care provider may also prescribe anti-inflammatory medicine.  Follow these instructions at home:  Activity     If overuse is causing your muscle pain:  Slow down your activities until the pain goes away.Do regular, gentle exercises if you are not usually active.Warm up before exercising. Stretch before and after exercising. This can help lower the risk of muscle pain.Do not continue working out if the pain is very bad. Bad pain could mean that you have injured a muscle.Managing pain and discomfort     Image   If directed, apply ice to the sore muscle:  Put ice in a plastic bag.Place a towel between your skin and the bag.Leave the ice on for 20 minutes, 2–3 times a day.You may also alternate between applying ice and applying heat as told by your health care provider. To apply heat, use the heat source that your health care provider recommends, such as a moist heat pack or a heating pad.  Place a towel between your skin and the heat source.Leave the heat on for 20–30 minutes.Remove the heat if your skin turns bright red. This is especially important if you are unable to feel pain, heat, or cold. You may have a greater risk of getting burned.Medicines     Take over-the-counter and prescription medicines only as told by your health care provider.Do not drive or use heavy machinery while taking prescription pain medicine.Contact a health care provider if:  Your muscle pain gets worse and medicines do not help.You have muscle pain that lasts longer than 3 days.You have a rash or fever along with muscle pain.You have muscle pain after a tick bite.You have muscle pain while working out, even though you are in good physical condition.You have redness, soreness, or swelling along with muscle pain.You have muscle pain after starting a new medicine or changing the dose of a medicine.Get help right away if:  You have trouble breathing.You have trouble swallowing.You have muscle pain along with a stiff neck, fever, and vomiting.You have severe muscle weakness or cannot move part of your body.This information is not intended to replace advice given to you by your health care provider. Make sure you discuss any questions you have with your health care provider.

## 2019-10-22 NOTE — ED PROVIDER NOTE - PMH
Aortic valve stenosis, moderate    Calculus of bile duct with cholangitis without obstruction    Common bile duct stone    Coronary artery disease involving native coronary artery of native heart without angina pectoris    Depression    Depression    DVT (deep venous thrombosis)  2016 right leg  HLD (hyperlipidemia)    HTN (hypertension)    Left bundle branch block    LVH (left ventricular hypertrophy)    MI, acute, non ST segment elevation  Family denies, (information in allscripts)  Mild dementia    MR (mitral regurgitation)    OA (osteoarthritis)    Pancreas disorder    Paroxysmal atrial fibrillation

## 2019-10-22 NOTE — ED PROVIDER NOTE - CARE PROVIDER_API CALL
Luis Felipe Palmer)  Family Medicine  [address], [address], [place] [address]  Phone: [phone]  Fax: [phone]  Follow Up Time:

## 2019-10-28 ENCOUNTER — APPOINTMENT (OUTPATIENT)
Dept: FAMILY MEDICINE | Facility: CLINIC | Age: 84
End: 2019-10-28

## 2019-11-08 ENCOUNTER — APPOINTMENT (OUTPATIENT)
Dept: FAMILY MEDICINE | Facility: CLINIC | Age: 84
End: 2019-11-08
Payer: MEDICARE

## 2019-11-08 VITALS
WEIGHT: 106 LBS | BODY MASS INDEX: 20.81 KG/M2 | DIASTOLIC BLOOD PRESSURE: 62 MMHG | SYSTOLIC BLOOD PRESSURE: 90 MMHG | HEIGHT: 60 IN

## 2019-11-08 PROCEDURE — 90653 IIV ADJUVANT VACCINE IM: CPT

## 2019-11-08 PROCEDURE — G0008: CPT

## 2019-11-08 PROCEDURE — 99214 OFFICE O/P EST MOD 30 MIN: CPT | Mod: 25

## 2019-11-08 RX ORDER — NORTRIPTYLINE HYDROCHLORIDE 50 MG/1
50 CAPSULE ORAL
Qty: 90 | Refills: 0 | Status: DISCONTINUED | COMMUNITY
Start: 2018-06-07 | End: 2019-11-08

## 2019-11-08 RX ORDER — PANTOPRAZOLE 20 MG/1
20 TABLET, DELAYED RELEASE ORAL DAILY
Qty: 30 | Refills: 3 | Status: DISCONTINUED | COMMUNITY
Start: 2019-03-20 | End: 2019-11-08

## 2019-11-08 NOTE — HISTORY OF PRESENT ILLNESS
[FreeTextEntry1] : On 10/22/19, pt. went to  due to dyspnea and chest pain. W/u negative and sicharged\par Pt. also has some kind of rash under her breasts, very itchy. Pt. tried Gold Bond powder but it is not helping.

## 2019-11-08 NOTE — PHYSICAL EXAM
[Normal] : soft, non-tender, non-distended, no masses palpated, no HSM and normal bowel sounds [de-identified] : macular rash under both brasts.

## 2019-11-22 ENCOUNTER — NON-APPOINTMENT (OUTPATIENT)
Age: 84
End: 2019-11-22

## 2019-11-22 ENCOUNTER — APPOINTMENT (OUTPATIENT)
Dept: FAMILY MEDICINE | Facility: CLINIC | Age: 84
End: 2019-11-22
Payer: MEDICARE

## 2019-11-22 VITALS
SYSTOLIC BLOOD PRESSURE: 90 MMHG | BODY MASS INDEX: 21.6 KG/M2 | DIASTOLIC BLOOD PRESSURE: 68 MMHG | HEIGHT: 60 IN | WEIGHT: 110 LBS

## 2019-11-22 PROCEDURE — 36415 COLL VENOUS BLD VENIPUNCTURE: CPT

## 2019-11-22 PROCEDURE — 99213 OFFICE O/P EST LOW 20 MIN: CPT | Mod: 25

## 2019-11-22 PROCEDURE — 93000 ELECTROCARDIOGRAM COMPLETE: CPT

## 2019-11-22 PROCEDURE — G0444 DEPRESSION SCREEN ANNUAL: CPT | Mod: 59

## 2019-11-22 PROCEDURE — G0439: CPT

## 2019-11-22 PROCEDURE — G0442 ANNUAL ALCOHOL SCREEN 15 MIN: CPT | Mod: 59

## 2019-11-22 PROCEDURE — 81002 URINALYSIS NONAUTO W/O SCOPE: CPT

## 2019-11-22 NOTE — PHYSICAL EXAM
[No Acute Distress] : no acute distress [Well Nourished] : well nourished [Well Developed] : well developed [Well-Appearing] : well-appearing [Normal Sclera/Conjunctiva] : normal sclera/conjunctiva [PERRL] : pupils equal round and reactive to light [EOMI] : extraocular movements intact [Normal Outer Ear/Nose] : the outer ears and nose were normal in appearance [Normal Oropharynx] : the oropharynx was normal [No JVD] : no jugular venous distention [No Lymphadenopathy] : no lymphadenopathy [Supple] : supple [Thyroid Normal, No Nodules] : the thyroid was normal and there were no nodules present [No Respiratory Distress] : no respiratory distress  [No Accessory Muscle Use] : no accessory muscle use [Clear to Auscultation] : lungs were clear to auscultation bilaterally [Normal Rate] : normal rate  [Regular Rhythm] : with a regular rhythm [Normal S1, S2] : normal S1 and S2 [No Murmur] : no murmur heard [No Carotid Bruits] : no carotid bruits [No Abdominal Bruit] : a ~M bruit was not heard ~T in the abdomen [No Varicosities] : no varicosities [Pedal Pulses Present] : the pedal pulses are present [No Edema] : there was no peripheral edema [No Palpable Aorta] : no palpable aorta [No Extremity Clubbing/Cyanosis] : no extremity clubbing/cyanosis [Soft] : abdomen soft [Non Tender] : non-tender [Non-distended] : non-distended [No Masses] : no abdominal mass palpated [No HSM] : no HSM [Normal Bowel Sounds] : normal bowel sounds [Normal Posterior Cervical Nodes] : no posterior cervical lymphadenopathy [Normal Anterior Cervical Nodes] : no anterior cervical lymphadenopathy [No CVA Tenderness] : no CVA  tenderness [No Spinal Tenderness] : no spinal tenderness [No Joint Swelling] : no joint swelling [Grossly Normal Strength/Tone] : grossly normal strength/tone [No Rash] : no rash [Coordination Grossly Intact] : coordination grossly intact [No Focal Deficits] : no focal deficits [Normal Gait] : normal gait [Deep Tendon Reflexes (DTR)] : deep tendon reflexes were 2+ and symmetric [Normal Affect] : the affect was normal [Normal Insight/Judgement] : insight and judgment were intact [FreeTextEntry1] : 500 R 40 L 30   1000 R 35 L 30   2000 R 40 L 35   4000 N/A L N/A

## 2019-11-22 NOTE — HEALTH RISK ASSESSMENT
[Very Good] : ~his/her~ current health as very good [Good] : ~his/her~  mood as  good [No] : In the past 12 months have you used drugs other than those required for medical reasons? No [No falls in past year] : Patient reported no falls in the past year [0] : 2) Feeling down, depressed, or hopeless: Not at all (0) [HIV test declined] : HIV test declined [Hepatitis C test declined] : Hepatitis C test declined [Learning/Retaining New Information] : difficulty learning/retaining new information [Spatial Ability and Orientation] : difficulty with spatial ability and orientation [None] : None [With Family] : lives with family [Retired] : retired [High School] : high school [] :  [# Of Children ___] : has [unfilled] children [Feels Safe at Home] : Feels safe at home [Fully functional (bathing, dressing, toileting, transferring, walking, feeding)] : Fully functional (bathing, dressing, toileting, transferring, walking, feeding) [Fully functional (using the telephone, shopping, preparing meals, housekeeping, doing laundry, using] : Fully functional and needs no help or supervision to perform IADLs (using the telephone, shopping, preparing meals, housekeeping, doing laundry, using transportation, managing medications and managing finances) [Reports changes in hearing] : Reports changes in hearing [Reports changes in vision] : Reports changes in vision [Reports changes in dental health] : Reports changes in dental health [Seat Belt] :  uses seat belt [Sunscreen] : uses sunscreen [Designated Healthcare Proxy] : Designated healthcare proxy [] : No [1 or 2 (0 pts)] : 1 or 2 (0 points) [Never (0 pts)] : Never (0 points) [[id number]] : 0 [Change in mental status noted] : No change in mental status noted [Language] : denies difficulty with language [Behavior] : denies difficulty with behavior [Handling Complex Tasks] : denies difficulty handling complex tasks [Reasoning] : denies difficulty with reasoning [Sexually Active] : not sexually active [High Risk Behavior] : no high risk behavior [Smoke Detector] : no smoke detector [Carbon Monoxide Detector] : no carbon monoxide detector [Guns at Home] : no guns at home [AdvancecareDate] : 11/19

## 2019-11-24 LAB
ALBUMIN SERPL ELPH-MCNC: 4.4 G/DL
ALP BLD-CCNC: 92 U/L
ALT SERPL-CCNC: 12 U/L
ANION GAP SERPL CALC-SCNC: 15 MMOL/L
AST SERPL-CCNC: 16 U/L
BASOPHILS # BLD AUTO: 0.08 K/UL
BASOPHILS NFR BLD AUTO: 1.3 %
BILIRUB SERPL-MCNC: 0.2 MG/DL
BUN SERPL-MCNC: 24 MG/DL
CALCIUM SERPL-MCNC: 9.3 MG/DL
CHLORIDE SERPL-SCNC: 106 MMOL/L
CHOLEST SERPL-MCNC: 152 MG/DL
CHOLEST/HDLC SERPL: 2 RATIO
CO2 SERPL-SCNC: 23 MMOL/L
CREAT SERPL-MCNC: 1.58 MG/DL
EOSINOPHIL # BLD AUTO: 0.29 K/UL
EOSINOPHIL NFR BLD AUTO: 4.8 %
GLUCOSE SERPL-MCNC: 77 MG/DL
HCT VFR BLD CALC: 39.9 %
HDLC SERPL-MCNC: 75 MG/DL
HGB BLD-MCNC: 12.3 G/DL
IMM GRANULOCYTES NFR BLD AUTO: 0.2 %
LDLC SERPL CALC-MCNC: 58 MG/DL
LYMPHOCYTES # BLD AUTO: 1.25 K/UL
LYMPHOCYTES NFR BLD AUTO: 20.7 %
MAN DIFF?: NORMAL
MCHC RBC-ENTMCNC: 29.9 PG
MCHC RBC-ENTMCNC: 30.8 GM/DL
MCV RBC AUTO: 97.1 FL
MONOCYTES # BLD AUTO: 0.51 K/UL
MONOCYTES NFR BLD AUTO: 8.5 %
NEUTROPHILS # BLD AUTO: 3.89 K/UL
NEUTROPHILS NFR BLD AUTO: 64.5 %
PLATELET # BLD AUTO: 235 K/UL
POTASSIUM SERPL-SCNC: 4.9 MMOL/L
PROT SERPL-MCNC: 7 G/DL
RBC # BLD: 4.11 M/UL
RBC # FLD: 14 %
SODIUM SERPL-SCNC: 144 MMOL/L
T3FREE SERPL-MCNC: 2.77 PG/ML
T4 FREE SERPL-MCNC: 1.4 NG/DL
TRIGL SERPL-MCNC: 97 MG/DL
TSH SERPL-ACNC: 1.94 UIU/ML
WBC # FLD AUTO: 6.03 K/UL

## 2019-11-25 LAB
BILIRUB UR QL STRIP: 0
CLARITY UR: CLEAR
COLLECTION METHOD: NORMAL
GLUCOSE UR-MCNC: 0
HCG UR QL: 0.2 EU/DL
HGB UR QL STRIP.AUTO: 0
KETONES UR-MCNC: 0
LEUKOCYTE ESTERASE UR QL STRIP: 0
NITRITE UR QL STRIP: 0
PH UR STRIP: 6.5
PROT UR STRIP-MCNC: 0
SP GR UR STRIP: 1.01

## 2019-12-08 ENCOUNTER — RX RENEWAL (OUTPATIENT)
Age: 84
End: 2019-12-08

## 2019-12-17 ENCOUNTER — APPOINTMENT (OUTPATIENT)
Dept: FAMILY MEDICINE | Facility: CLINIC | Age: 84
End: 2019-12-17
Payer: MEDICARE

## 2019-12-17 VITALS
SYSTOLIC BLOOD PRESSURE: 92 MMHG | HEIGHT: 60 IN | BODY MASS INDEX: 21.6 KG/M2 | DIASTOLIC BLOOD PRESSURE: 60 MMHG | WEIGHT: 110 LBS

## 2019-12-17 DIAGNOSIS — R60.0 LOCALIZED EDEMA: ICD-10-CM

## 2019-12-17 DIAGNOSIS — B37.2 CANDIDIASIS OF SKIN AND NAIL: ICD-10-CM

## 2019-12-17 DIAGNOSIS — M79.659 PAIN IN UNSPECIFIED THIGH: ICD-10-CM

## 2019-12-17 DIAGNOSIS — Z87.898 PERSONAL HISTORY OF OTHER SPECIFIED CONDITIONS: ICD-10-CM

## 2019-12-17 PROCEDURE — 99213 OFFICE O/P EST LOW 20 MIN: CPT

## 2019-12-17 NOTE — PLAN
[FreeTextEntry1] : may purchase interdry\par Talcum powder without cornstarch\par Dry skin well\par Let air dry after bathing and toweling off\par Use moisture willing fabrics\par

## 2019-12-17 NOTE — HISTORY OF PRESENT ILLNESS
[FreeTextEntry8] : Pt's feet are purple, started 2 days ago, only the toes were purple, now it is the whole foot.Pt. has feeling on both feet, but they are ice cold. \par Also, fill out papers .\par Pt. still has a rash under her breasts, would like something stronger. Asked about interdry. Advised can get that over the counter. Can also purchase moisture wicking shirts to wear.

## 2019-12-17 NOTE — PHYSICAL EXAM
[Normal] : no respiratory distress, lungs were clear to auscultation bilaterally and no accessory muscle use [de-identified] : feet warm but minimally cyanotic, pulses diminished. No breakdown.

## 2019-12-23 ENCOUNTER — APPOINTMENT (OUTPATIENT)
Dept: VASCULAR SURGERY | Facility: CLINIC | Age: 84
End: 2019-12-23
Payer: MEDICARE

## 2019-12-23 VITALS
BODY MASS INDEX: 21.6 KG/M2 | HEART RATE: 77 BPM | OXYGEN SATURATION: 77 % | WEIGHT: 110 LBS | DIASTOLIC BLOOD PRESSURE: 81 MMHG | HEIGHT: 60 IN | SYSTOLIC BLOOD PRESSURE: 148 MMHG | TEMPERATURE: 97.5 F

## 2019-12-23 PROCEDURE — 99203 OFFICE O/P NEW LOW 30 MIN: CPT

## 2019-12-23 NOTE — PHYSICAL EXAM
[Carotid Bruits] : no carotid bruits [JVD] : no jugular venous distention  [Normal Breath Sounds] : Normal breath sounds [1+] : left 1+ [2+] : left 2+ [Varicose Veins Of Lower Extremities] : bilaterally [Ankle Swelling (On Exam)] : not present [Alert] : alert [No Rash or Lesion] : No rash or lesion [] : not present [Oriented to Person] : oriented to person [Oriented to Time] : oriented to time [Oriented to Place] : oriented to place [Calm] : calm [de-identified] : Well appearing, pleasant [FreeTextEntry1] : brisk capillary refill in both feet

## 2019-12-23 NOTE — ASSESSMENT
[FreeTextEntry1] : 89 year old female with transient discoloration of the toes in both feet likely secondary to Raynaud's phenomenon.\par She has no vasculogenic claudication, rest pain or tissue loss.\par Pedal pulses are palpable, Unlikely has any significant PAD.\par Will hold off on any diagnostic testing at this time\par Recommend cold avoidance and warm wool stockings\par Follow up PRN

## 2019-12-23 NOTE — HISTORY OF PRESENT ILLNESS
[FreeTextEntry1] : 89 year old female referred for evaluation of discoloration in the toes in both legs.\par Patient's health aide notices that in the mornings, he plantar aspect of her feet and her toes are dark blue and states that the color improved after 1-2 hours. her aide states that this began few weeks ago.\par Patient walks with a cane. She denies intermittent claudication or pain at rest. \par She states that she otherwise feels well.\par No similar symptoms in her fingers.

## 2020-01-02 ENCOUNTER — RX RENEWAL (OUTPATIENT)
Age: 85
End: 2020-01-02

## 2020-01-06 ENCOUNTER — RX RENEWAL (OUTPATIENT)
Age: 85
End: 2020-01-06

## 2020-01-06 RX ORDER — PANTOPRAZOLE 20 MG/1
20 TABLET, DELAYED RELEASE ORAL DAILY
Qty: 30 | Refills: 2 | Status: DISCONTINUED | COMMUNITY
Start: 2019-08-06 | End: 2020-01-06

## 2020-02-09 ENCOUNTER — EMERGENCY (EMERGENCY)
Facility: HOSPITAL | Age: 85
LOS: 0 days | Discharge: HOME | End: 2020-02-09
Attending: EMERGENCY MEDICINE
Payer: MEDICARE

## 2020-02-09 VITALS
SYSTOLIC BLOOD PRESSURE: 167 MMHG | TEMPERATURE: 98 F | RESPIRATION RATE: 15 BRPM | OXYGEN SATURATION: 98 % | HEART RATE: 74 BPM | DIASTOLIC BLOOD PRESSURE: 73 MMHG

## 2020-02-09 VITALS — HEIGHT: 60 IN | WEIGHT: 111.99 LBS

## 2020-02-09 DIAGNOSIS — I44.7 LEFT BUNDLE-BRANCH BLOCK, UNSPECIFIED: ICD-10-CM

## 2020-02-09 DIAGNOSIS — Z98.890 OTHER SPECIFIED POSTPROCEDURAL STATES: Chronic | ICD-10-CM

## 2020-02-09 DIAGNOSIS — S01.81XA LACERATION WITHOUT FOREIGN BODY OF OTHER PART OF HEAD, INITIAL ENCOUNTER: ICD-10-CM

## 2020-02-09 DIAGNOSIS — S09.90XA UNSPECIFIED INJURY OF HEAD, INITIAL ENCOUNTER: ICD-10-CM

## 2020-02-09 DIAGNOSIS — I34.0 NONRHEUMATIC MITRAL (VALVE) INSUFFICIENCY: ICD-10-CM
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
## 2020-02-09 DIAGNOSIS — I51.7 CARDIOMEGALY: ICD-10-CM

## 2020-02-09 DIAGNOSIS — Z79.82 LONG TERM (CURRENT) USE OF ASPIRIN: ICD-10-CM

## 2020-02-09 DIAGNOSIS — Z86.718 PERSONAL HISTORY OF OTHER VENOUS THROMBOSIS AND EMBOLISM: ICD-10-CM

## 2020-02-09 DIAGNOSIS — E78.5 HYPERLIPIDEMIA, UNSPECIFIED: ICD-10-CM

## 2020-02-09 DIAGNOSIS — Z96.643 PRESENCE OF ARTIFICIAL HIP JOINT, BILATERAL: Chronic | ICD-10-CM

## 2020-02-09 DIAGNOSIS — F03.90 UNSPECIFIED DEMENTIA WITHOUT BEHAVIORAL DISTURBANCE: ICD-10-CM

## 2020-02-09 DIAGNOSIS — M19.90 UNSPECIFIED OSTEOARTHRITIS, UNSPECIFIED SITE: ICD-10-CM
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
## 2020-02-09 DIAGNOSIS — I25.2 OLD MYOCARDIAL INFARCTION: ICD-10-CM

## 2020-02-09 DIAGNOSIS — I35.0 NONRHEUMATIC AORTIC (VALVE) STENOSIS: ICD-10-CM

## 2020-02-09 DIAGNOSIS — I48.0 PAROXYSMAL ATRIAL FIBRILLATION: ICD-10-CM

## 2020-02-09 DIAGNOSIS — I10 ESSENTIAL (PRIMARY) HYPERTENSION: ICD-10-CM

## 2020-02-09 DIAGNOSIS — S69.90XA UNSPECIFIED INJURY OF UNSPECIFIED WRIST, HAND AND FINGER(S), INITIAL ENCOUNTER: Chronic | ICD-10-CM

## 2020-02-09 DIAGNOSIS — I25.10 ATHEROSCLEROTIC HEART DISEASE OF NATIVE CORONARY ARTERY WITHOUT ANGINA PECTORIS: ICD-10-CM

## 2020-02-09 PROCEDURE — 99284 EMERGENCY DEPT VISIT MOD MDM: CPT | Mod: 25

## 2020-02-09 PROCEDURE — 99284 EMERGENCY DEPT VISIT MOD MDM: CPT

## 2020-02-09 PROCEDURE — 72125 CT NECK SPINE W/O DYE: CPT

## 2020-02-09 PROCEDURE — 70450 CT HEAD/BRAIN W/O DYE: CPT | Mod: 26

## 2020-02-09 PROCEDURE — 12011 RPR F/E/E/N/L/M 2.5 CM/<: CPT

## 2020-02-09 PROCEDURE — 70450 CT HEAD/BRAIN W/O DYE: CPT

## 2020-02-09 PROCEDURE — 72125 CT NECK SPINE W/O DYE: CPT | Mod: 26

## 2020-02-09 NOTE — ED ADULT TRIAGE NOTE - CHIEF COMPLAINT QUOTE
Patient comes in s/p fall. Patient fell in the bathroom, hitting her head on the floor. Patient has laceration to forehead. MD Bertrand evaluated patient at triage. Neuro alert called at 1253. Patient on Aspirin daily.

## 2020-02-09 NOTE — ED PROVIDER NOTE - ATTENDING CONTRIBUTION TO CARE
I, Elana Valladares MD, personally saw the patient with ACP.  I have personally performed a face to face diagnostic evaluation on this patient.  I have reviewed the ACP note and agree with the history, exam, and plan of care, except as noted.

## 2020-02-09 NOTE — ED PROVIDER NOTE - OBJECTIVE STATEMENT
89 YOF PMHx of mitral regurgitation, PAF, CAD, MI, LBBB, LVH, HTN, HLD, DVT (2016 - RLE), pancreas disorder s/p ERCP, OA, depression, mild dementia presents to ED s/p slip and fall with head strike PTA. Takes 81 mg ASA every day, Neuro Alert activated upon ED arrival. Daughter at bedside, reports receiving a call from pt's aide this afternoon. Aide told daughter that pt slipped and fell forward over her slippers while being assisted in the bathroom. Fall occurred at approximately 11:45. Pt struck her head on the ground, had epistaxis after head strike that resolved spontaneously. Currently pt complaining of HA. Takes 81 mg ASA, no other BT/AC. Denies UE or LE pain. Presents with abrasion/laceration to the hairline and bruising to frontal region of head.

## 2020-02-09 NOTE — ED PROVIDER NOTE - PROGRESS NOTE DETAILS
signed Florina Presley PA-C   89F tripped on ill-fitting slippers in bathroom with her aide PTA, fell and hit head on ground. No LOC, on ASA. Pt alert, NAD, No significant findings on CT. forehead hematoma with 1cm well approximated laceration along hairline, amenable to gluing. Pt denies symptoms currently. Outpt f/u PMD. return precautions given. Pt given copy of imaging results. Pt feeling well, pt and family agree with DC and plan of care.

## 2020-02-09 NOTE — ED STATDOCS - PROGRESS NOTE DETAILS
Pippa OSEI for ED Attending Dr. Bertrand: called to [place] for neuro alert. Pt tripped over slippers and sustained lac to head. activated as neuro alert. Pippa OSEI for ED Attending Dr. Bertrand: called to [place] for neuro alert. Pt on ASA tripped over slippers and sustained lac to head. activated as neuro alert.

## 2020-02-09 NOTE — ED PROVIDER NOTE - PATIENT PORTAL LINK FT
You can access the FollowMyHealth Patient Portal offered by [facility] by registering at the following website: [email or web address]. By joining [facility]’s FollowMyHealth portal, you will also be able to view your health information using other applications (apps) compatible with our system.

## 2020-02-09 NOTE — ED PROVIDER NOTE - ENMT, MLM
+Ecchymosis to forehead. +2 cm abrasion / laceration to hairline. Airway patent, + dry blood to nares B/L. No nasal septal hematoma. Mouth with normal mucosa. Throat has no vesicles, no oropharyngeal exudates and uvula is midline.

## 2020-02-09 NOTE — ED ADULT NURSE NOTE - OBJECTIVE STATEMENT
Rcvd pt after returning from CT scan. Pt is bleeding from head. Pt states she tripped over her slippers in her bathroom and fell. Pt is A&Ox3, no other signs of trauma noted. Pt is a [employer]oll[employer] historian, denies LOC, Pt denies taking aspirin and states she "takes a bunch of pills". Pt states she lives with her daughter.

## 2020-02-09 NOTE — ED PROVIDER NOTE - CARE PLAN
Principal Discharge DX:	Traumatic injury of head, initial encounter  Secondary Diagnosis:	Laceration of forehead, initial encounter

## 2020-02-19 ENCOUNTER — APPOINTMENT (OUTPATIENT)
Dept: FAMILY MEDICINE | Facility: CLINIC | Age: 85
End: 2020-02-19
Payer: MEDICARE

## 2020-02-19 VITALS
BODY MASS INDEX: 21.6 KG/M2 | HEIGHT: 60 IN | DIASTOLIC BLOOD PRESSURE: 68 MMHG | WEIGHT: 110 LBS | SYSTOLIC BLOOD PRESSURE: 110 MMHG

## 2020-02-19 PROCEDURE — 99213 OFFICE O/P EST LOW 20 MIN: CPT

## 2020-02-19 NOTE — ASSESSMENT
[FreeTextEntry1] : Possible muscle strain\par Doubt rib fx or acute abdomen since completely benign exam.

## 2020-02-19 NOTE — HISTORY OF PRESENT ILLNESS
[FreeTextEntry8] : Pt. c/o loss of appetite and pain in L side of back, started late this morning after pt. was walking around the block. No N/V/D\par Fell  5 days ago. Taken to the ED. laceration to forehead. Denies any xrays. No N/V/D. No abdominal pain. Daughter told her she thought it was her gall bladder.

## 2020-02-19 NOTE — PHYSICAL EXAM
[No Hernias] : no hernias [Normal] : soft, non-tender, non-distended, no masses palpated, no HSM and normal bowel sounds [de-identified] : pain locallized to left flank, but no pain on percussion. No point tenderness. No rib tenderness

## 2020-02-19 NOTE — PLAN
[FreeTextEntry1] : Advised to rest\par Tylenol for pain\par If pain worsens, persists, or becomes lightheaded, f/u in ED for xray or CT Scan\par Pt. agreeable.

## 2020-02-19 NOTE — COUNSELING
[Behavioral health counseling provided] : Behavioral health counseling provided [Fall prevention counseling provided] : Fall prevention counseling provided

## 2020-02-25 ENCOUNTER — APPOINTMENT (OUTPATIENT)
Dept: FAMILY MEDICINE | Facility: CLINIC | Age: 85
End: 2020-02-25
Payer: MEDICARE

## 2020-02-25 VITALS
WEIGHT: 110 LBS | HEIGHT: 60 IN | DIASTOLIC BLOOD PRESSURE: 68 MMHG | BODY MASS INDEX: 21.6 KG/M2 | SYSTOLIC BLOOD PRESSURE: 110 MMHG

## 2020-02-25 DIAGNOSIS — R13.10 DYSPHAGIA, UNSPECIFIED: ICD-10-CM

## 2020-02-25 PROCEDURE — 99214 OFFICE O/P EST MOD 30 MIN: CPT

## 2020-02-25 NOTE — HISTORY OF PRESENT ILLNESS
[FreeTextEntry1] : Pt. is here for a f/u from the fall. Pt. still has abdominal pain, she can not eat and she can not drink. Pt's aide also asked if there is a liquid version for her medications, pt. is choking every time she takes the pills or drinks water. Has been incontinent of stool. Denies back pain. C/o bilateral lower abdominal pain

## 2020-02-25 NOTE — PLAN
[FreeTextEntry1] : Will send for CT of the abdomen and pelvis\par Start by crushing meds that are able to be crushed, list given\par Await results of CT\par If not helpful may require swallowing evaluation\par Stool c/s, O&P x3 and c dif testing\par further tx as her condition requires.\par discussed with pt. daughter with the patient's permission.

## 2020-02-26 ENCOUNTER — APPOINTMENT (OUTPATIENT)
Dept: CT IMAGING | Facility: CLINIC | Age: 85
End: 2020-02-26

## 2020-02-26 ENCOUNTER — EMERGENCY (EMERGENCY)
Facility: HOSPITAL | Age: 85
LOS: 0 days | Discharge: HOME | End: 2020-02-26
Attending: EMERGENCY MEDICINE
Payer: MEDICARE

## 2020-02-26 ENCOUNTER — APPOINTMENT (OUTPATIENT)
Dept: RADIOLOGY | Facility: CLINIC | Age: 85
End: 2020-02-26

## 2020-02-26 VITALS — WEIGHT: 110.01 LBS | HEIGHT: 60 IN

## 2020-02-26 VITALS — HEART RATE: 88 BPM

## 2020-02-26 DIAGNOSIS — I25.2 OLD MYOCARDIAL INFARCTION: ICD-10-CM

## 2020-02-26 DIAGNOSIS — R10.9 UNSPECIFIED ABDOMINAL PAIN: ICD-10-CM

## 2020-02-26 DIAGNOSIS — N28.1 CYST OF KIDNEY, ACQUIRED: ICD-10-CM

## 2020-02-26 DIAGNOSIS — E78.5 HYPERLIPIDEMIA, UNSPECIFIED: ICD-10-CM

## 2020-02-26 DIAGNOSIS — Z98.890 OTHER SPECIFIED POSTPROCEDURAL STATES: Chronic | ICD-10-CM

## 2020-02-26 DIAGNOSIS — S69.90XA UNSPECIFIED INJURY OF UNSPECIFIED WRIST, HAND AND FINGER(S), INITIAL ENCOUNTER: Chronic | ICD-10-CM

## 2020-02-26 DIAGNOSIS — I25.10 ATHEROSCLEROTIC HEART DISEASE OF NATIVE CORONARY ARTERY WITHOUT ANGINA PECTORIS: ICD-10-CM

## 2020-02-26 DIAGNOSIS — R53.1 WEAKNESS: ICD-10-CM

## 2020-02-26 DIAGNOSIS — F03.90 UNSPECIFIED DEMENTIA WITHOUT BEHAVIORAL DISTURBANCE: ICD-10-CM

## 2020-02-26 DIAGNOSIS — I44.7 LEFT BUNDLE-BRANCH BLOCK, UNSPECIFIED: ICD-10-CM

## 2020-02-26 DIAGNOSIS — Z79.82 LONG TERM (CURRENT) USE OF ASPIRIN: ICD-10-CM

## 2020-02-26 DIAGNOSIS — Z96.643 PRESENCE OF ARTIFICIAL HIP JOINT, BILATERAL: ICD-10-CM

## 2020-02-26 DIAGNOSIS — I35.0 NONRHEUMATIC AORTIC (VALVE) STENOSIS: ICD-10-CM

## 2020-02-26 DIAGNOSIS — K80.70 CALCULUS OF GALLBLADDER AND BILE DUCT WITHOUT CHOLECYSTITIS WITHOUT OBSTRUCTION: ICD-10-CM

## 2020-02-26 DIAGNOSIS — R30.0 DYSURIA: ICD-10-CM

## 2020-02-26 DIAGNOSIS — F32.9 MAJOR DEPRESSIVE DISORDER, SINGLE EPISODE, UNSPECIFIED: ICD-10-CM

## 2020-02-26 DIAGNOSIS — Z86.718 PERSONAL HISTORY OF OTHER VENOUS THROMBOSIS AND EMBOLISM: ICD-10-CM

## 2020-02-26 DIAGNOSIS — Z96.643 PRESENCE OF ARTIFICIAL HIP JOINT, BILATERAL: Chronic | ICD-10-CM

## 2020-02-26 DIAGNOSIS — I48.0 PAROXYSMAL ATRIAL FIBRILLATION: ICD-10-CM

## 2020-02-26 DIAGNOSIS — I10 ESSENTIAL (PRIMARY) HYPERTENSION: ICD-10-CM

## 2020-02-26 DIAGNOSIS — I34.0 NONRHEUMATIC MITRAL (VALVE) INSUFFICIENCY: ICD-10-CM

## 2020-02-26 LAB
ADD ON TEST-SPECIMEN IN LAB: SIGNIFICANT CHANGE UP
ALBUMIN SERPL ELPH-MCNC: 3.3 G/DL — SIGNIFICANT CHANGE UP (ref 3.3–5)
ALP SERPL-CCNC: 126 U/L — HIGH (ref 40–120)
ALT FLD-CCNC: 14 U/L — SIGNIFICANT CHANGE UP (ref 12–78)
ANION GAP SERPL CALC-SCNC: 5 MMOL/L — SIGNIFICANT CHANGE UP (ref 5–17)
APPEARANCE UR: CLEAR — SIGNIFICANT CHANGE UP
AST SERPL-CCNC: 16 U/L — SIGNIFICANT CHANGE UP (ref 15–37)
BASOPHILS # BLD AUTO: 0.08 K/UL — SIGNIFICANT CHANGE UP (ref 0–0.2)
BASOPHILS NFR BLD AUTO: 0.9 % — SIGNIFICANT CHANGE UP (ref 0–2)
BILIRUB SERPL-MCNC: 0.4 MG/DL — SIGNIFICANT CHANGE UP (ref 0.2–1.2)
BILIRUB UR-MCNC: NEGATIVE — SIGNIFICANT CHANGE UP
BUN SERPL-MCNC: 15 MG/DL — SIGNIFICANT CHANGE UP (ref 7–23)
CALCIUM SERPL-MCNC: 9 MG/DL — SIGNIFICANT CHANGE UP (ref 8.5–10.1)
CHLORIDE SERPL-SCNC: 107 MMOL/L — SIGNIFICANT CHANGE UP (ref 96–108)
CO2 SERPL-SCNC: 25 MMOL/L — SIGNIFICANT CHANGE UP (ref 22–31)
COLOR SPEC: YELLOW — SIGNIFICANT CHANGE UP
CREAT SERPL-MCNC: 1.11 MG/DL — SIGNIFICANT CHANGE UP (ref 0.5–1.3)
DIFF PNL FLD: ABNORMAL
EOSINOPHIL # BLD AUTO: 0.1 K/UL — SIGNIFICANT CHANGE UP (ref 0–0.5)
EOSINOPHIL NFR BLD AUTO: 1.2 % — SIGNIFICANT CHANGE UP (ref 0–6)
GLUCOSE SERPL-MCNC: 84 MG/DL — SIGNIFICANT CHANGE UP (ref 70–99)
GLUCOSE UR QL: NEGATIVE MG/DL — SIGNIFICANT CHANGE UP
HCT VFR BLD CALC: 35.1 % — SIGNIFICANT CHANGE UP (ref 34.5–45)
HGB BLD-MCNC: 11.3 G/DL — LOW (ref 11.5–15.5)
IMM GRANULOCYTES NFR BLD AUTO: 0.5 % — SIGNIFICANT CHANGE UP (ref 0–1.5)
KETONES UR-MCNC: ABNORMAL
LACTATE SERPL-SCNC: 1.2 MMOL/L — SIGNIFICANT CHANGE UP (ref 0.7–2)
LEUKOCYTE ESTERASE UR-ACNC: ABNORMAL
LYMPHOCYTES # BLD AUTO: 0.8 K/UL — LOW (ref 1–3.3)
LYMPHOCYTES # BLD AUTO: 9.4 % — LOW (ref 13–44)
MCHC RBC-ENTMCNC: 29.5 PG — SIGNIFICANT CHANGE UP (ref 27–34)
MCHC RBC-ENTMCNC: 32.2 GM/DL — SIGNIFICANT CHANGE UP (ref 32–36)
MCV RBC AUTO: 91.6 FL — SIGNIFICANT CHANGE UP (ref 80–100)
MONOCYTES # BLD AUTO: 0.88 K/UL — SIGNIFICANT CHANGE UP (ref 0–0.9)
MONOCYTES NFR BLD AUTO: 10.4 % — SIGNIFICANT CHANGE UP (ref 2–14)
NEUTROPHILS # BLD AUTO: 6.6 K/UL — SIGNIFICANT CHANGE UP (ref 1.8–7.4)
NEUTROPHILS NFR BLD AUTO: 77.6 % — HIGH (ref 43–77)
NITRITE UR-MCNC: NEGATIVE — SIGNIFICANT CHANGE UP
PH UR: 5 — SIGNIFICANT CHANGE UP (ref 5–8)
PLATELET # BLD AUTO: 375 K/UL — SIGNIFICANT CHANGE UP (ref 150–400)
POTASSIUM SERPL-MCNC: 3.9 MMOL/L — SIGNIFICANT CHANGE UP (ref 3.5–5.3)
POTASSIUM SERPL-SCNC: 3.9 MMOL/L — SIGNIFICANT CHANGE UP (ref 3.5–5.3)
PROT SERPL-MCNC: 7.4 GM/DL — SIGNIFICANT CHANGE UP (ref 6–8.3)
PROT UR-MCNC: 30 MG/DL
RBC # BLD: 3.83 M/UL — SIGNIFICANT CHANGE UP (ref 3.8–5.2)
RBC # FLD: 13.3 % — SIGNIFICANT CHANGE UP (ref 10.3–14.5)
SODIUM SERPL-SCNC: 137 MMOL/L — SIGNIFICANT CHANGE UP (ref 135–145)
SP GR SPEC: 1.01 — SIGNIFICANT CHANGE UP (ref 1.01–1.02)
UROBILINOGEN FLD QL: NEGATIVE MG/DL — SIGNIFICANT CHANGE UP
WBC # BLD: 8.5 K/UL — SIGNIFICANT CHANGE UP (ref 3.8–10.5)
WBC # FLD AUTO: 8.5 K/UL — SIGNIFICANT CHANGE UP (ref 3.8–10.5)

## 2020-02-26 PROCEDURE — 96360 HYDRATION IV INFUSION INIT: CPT | Mod: XU

## 2020-02-26 PROCEDURE — 93005 ELECTROCARDIOGRAM TRACING: CPT

## 2020-02-26 PROCEDURE — 81001 URINALYSIS AUTO W/SCOPE: CPT

## 2020-02-26 PROCEDURE — 80053 COMPREHEN METABOLIC PANEL: CPT

## 2020-02-26 PROCEDURE — 74177 CT ABD & PELVIS W/CONTRAST: CPT | Mod: 26

## 2020-02-26 PROCEDURE — 71045 X-RAY EXAM CHEST 1 VIEW: CPT | Mod: 26

## 2020-02-26 PROCEDURE — 85025 COMPLETE CBC W/AUTO DIFF WBC: CPT

## 2020-02-26 PROCEDURE — 87086 URINE CULTURE/COLONY COUNT: CPT

## 2020-02-26 PROCEDURE — 83605 ASSAY OF LACTIC ACID: CPT

## 2020-02-26 PROCEDURE — 76705 ECHO EXAM OF ABDOMEN: CPT | Mod: 26

## 2020-02-26 PROCEDURE — 83690 ASSAY OF LIPASE: CPT

## 2020-02-26 PROCEDURE — 99285 EMERGENCY DEPT VISIT HI MDM: CPT

## 2020-02-26 PROCEDURE — 76705 ECHO EXAM OF ABDOMEN: CPT

## 2020-02-26 PROCEDURE — 99284 EMERGENCY DEPT VISIT MOD MDM: CPT | Mod: 25

## 2020-02-26 PROCEDURE — 36415 COLL VENOUS BLD VENIPUNCTURE: CPT

## 2020-02-26 PROCEDURE — 74177 CT ABD & PELVIS W/CONTRAST: CPT

## 2020-02-26 PROCEDURE — 93010 ELECTROCARDIOGRAM REPORT: CPT

## 2020-02-26 PROCEDURE — 71045 X-RAY EXAM CHEST 1 VIEW: CPT

## 2020-02-26 RX ORDER — SODIUM CHLORIDE 9 MG/ML
500 INJECTION INTRAMUSCULAR; INTRAVENOUS; SUBCUTANEOUS ONCE
Refills: 0 | Status: COMPLETED | OUTPATIENT
Start: 2020-02-26 | End: 2020-02-26

## 2020-02-26 RX ADMIN — SODIUM CHLORIDE 500 MILLILITER(S): 9 INJECTION INTRAMUSCULAR; INTRAVENOUS; SUBCUTANEOUS at 19:11

## 2020-02-26 RX ADMIN — SODIUM CHLORIDE 500 MILLILITER(S): 9 INJECTION INTRAMUSCULAR; INTRAVENOUS; SUBCUTANEOUS at 20:11

## 2020-02-26 NOTE — ED PROVIDER NOTE - NSFOLLOWUPINSTRUCTIONS_ED_ALL_ED_FT
Continue current medciations as per routine.  Follow up Continue current medications as per routine.  Follow up Dr. Palmer next 2 - 3 days.        ED evaluation and management discussed with the patient and family (if available) in detail.  Close PMD follow up encouraged.  Strict ED return instructions discussed in detail and patient given the opportunity to ask any questions about their discharge diagnosis and instructions. Patient verbalized understanding.        Abdominal Pain    Many things can cause abdominal pain. Many times, abdominal pain is not caused by a disease and will improve without treatment. Your health care provider will do a physical exam to determine if there is a dangerous cause of your pain; blood tests and imaging may help determine the cause of your pain. However, in many cases, no cause may be found and you may need further testing as an outpatient. Monitor your abdominal pain for any changes.     SEEK IMMEDIATE MEDICAL CARE IF YOU HAVE ANY OF THE FOLLOWING SYMPTOMS: worsening abdominal pain, uncontrollable vomiting, profuse diarrhea, inability to have bowel movements or pass gas, black or bloody stools, fever accompanying chest pain or back pain, or fainting. These symptoms may represent a serious problem that is an emergency. Do not wait to see if the symptoms will go away. Get medical help right away. Call 911 and do not drive yourself to the hospital.        Back Pain    Back pain is very common in adults. The cause of back pain is rarely dangerous and the pain often gets better over time. The cause of your back pain may not be known and may include strain of muscles or ligaments, degeneration of the spinal disks, or arthritis. Occasionally the pain may radiate down your leg(s). Over-the-counter medicines to reduce pain and inflammation are often the most helpful. Stretching and remaining active frequently helps the healing process.     SEEK IMMEDIATE MEDICAL CARE IF YOU HAVE ANY OF THE FOLLOWING SYMPTOMS: bowel or bladder control problems, unusual weakness or numbness in your arms or legs, nausea or vomiting, abdominal pain, fever, dizziness/lightheadedness.

## 2020-02-26 NOTE — ED PROVIDER NOTE - PATIENT PORTAL LINK FT
You can access the FollowMyHealth Patient Portal offered by [facility] by registering at the following website: [email or web address]. By joining [employer]’s FollowMyHealth portal, you will also be able to view your health information using other applications (apps) compatible with our system.

## 2020-02-26 NOTE — ED PROVIDER NOTE - PROGRESS NOTE DETAILS
Pippa PEREZ for ED attending, Dr. Hudson: Results of labs and CT reviewed and discussed with pt and family at bedside. Pt s/p EGD with biliary stent placed 1-2 years ago, no recent abd procedure. Abd reexamined pt more tender in right CVA than right abd, no guarding or rebound. Paging Dr. Palmer for further management and whether surgical consult is requested. CXR and RUQ US ordered. Pippa PEREZ for ED attending, Dr. Hudson: Case discussed Dr. Luis Felipe Palmer including labs and CT report, pt presently in US. Dr. Palmer prefers d/c home with close office f/u if CXR and RUQ US w/o acute pathology. Dr. Hudson:  Results of u/s, CXR (negative acute pathology) d/w pt & family, who agree with D/C home, close f/u in office with Dr. Palmer.

## 2020-02-26 NOTE — ED PROVIDER NOTE - CLINICAL SUMMARY MEDICAL DECISION MAKING FREE TEXT BOX
89 y/o female with PMHx of aortic valve stenosis, CAD, DVT, HLD, HTN, MI, Afib, and s/p b/l hip replacements presents ambulatory to the ED from home c/o abd pain, discolored urine. Pt 1 week s/p fall, had PCP f/u that was supposedly unremarkable. Pt with poor PO intake afterwards. Supposed to have CT abd pelvis today outpatient. On exam no abd TTP though +b/l CVA TTP.   Plan: cautious IV fluids, labs including lipase, urine, lactate, urine via straight cath, and CT abd pelvis. Observe and reassess.

## 2020-02-26 NOTE — ED ADULT NURSE NOTE - OBJECTIVE STATEMENT
Patient presents with family reports she went to the bathroom today and reports purple liquid was coming out of vagina. Patient reports abdominal pain for a few weeks

## 2020-02-26 NOTE — ED ADULT NURSE NOTE - NSIMPLEMENTINTERV_GEN_ALL_ED
Implemented All Fall Risk Interventions:  [place] to call system. Call bell, personal items and telephone within reach. Instruct patient to call for assistance. Room bathroom lighting operational. Non-slip footwear when patient is off stretcher. Physically safe environment: no spills, clutter or unnecessary equipment. Stretcher in lowest position, wheels locked, appropriate side rails in place. Provide visual cue, wrist band, yellow gown, etc. Monitor gait and stability. Monitor for mental status changes and reorient to person, place, and time. Review medications for side effects contributing to fall risk. Reinforce activity limits and safety measures with patient and family.

## 2020-02-26 NOTE — ED STATDOCS - PROGRESS NOTE DETAILS
Pippa Mccarty for attending Dr. Lopez: 89 y/o female with a PMHx of aortic valve stenosis, common bile duct stone, CAD, depression, DVT, HTN, HLD, LBBB, LVH, MI, mitral regurgitation, OA, pancreas disorder, paroxysmal atrial fibrillation presents to the ED c/o abd pain x weeks.  Denies n/v/d. Pt scheduled for outpatient CT but pain worsened so pt was brought to ED. Pt reports she went to the bathroom today and "some purple liquid came out of my vagina." No other complaints at this time. Will send pt to main ED for further evaluation.

## 2020-02-26 NOTE — ED ADULT TRIAGE NOTE - CHIEF COMPLAINT QUOTE
Pt c/o abdominal pain x several weeks , dark blood in urine. pt was scheduled  for outpatient ct abd/pelvic regarding idiopathic dysphagia

## 2020-02-26 NOTE — ED PROVIDER NOTE - OBJECTIVE STATEMENT
89 y/o female with PMHx of aortic valve stenosis, CAD, DVT, HLD, HTN, MI, Afib, and s/p b/l hip replacements presents ambulatory to the ED from home c/o +b/l abd pain x 1 week. Pain worsens with movement. Family at bedside reports pt fell 1 week ago, was told could be a pulled muscle by PCP at time of fall and had CT scheduled outpatient but pain worsened and endorses progressive +weakness and +decreased PO intake. Pt also notes +dysuria and +"purple discoloration" when urinating. No fever. NKDA. PCP: Dr. Luis Felipe Palmer.

## 2020-02-26 NOTE — ED PROVIDER NOTE - CARE PROVIDER_API CALL
Luis Felipe Palmer)  Family Medicine  [address], [address], [place] [address]  Phone: [phone]  Fax: [phone]  Follow Up Time:

## 2020-02-26 NOTE — ED PROVIDER NOTE - MUSCULOSKELETAL, MLM
Back nontender stable. Pelvis nontender, stable. Posterolateral ribs nontender, stable. +b/l CVA tenderness. neck nontender, supple. MAEx4 no focal swelling or tenderness. b/l SLR 35 degrees+ without pain. AFROM large joints without pain.

## 2020-02-26 NOTE — ED PROVIDER NOTE - CARE PLAN
Principal Discharge DX:	Abdominal pain  Secondary Diagnosis:	Calculus of gallbladder without cholecystitis without obstruction

## 2020-02-27 LAB
CULTURE RESULTS: NO GROWTH — SIGNIFICANT CHANGE UP
SPECIMEN SOURCE: SIGNIFICANT CHANGE UP

## 2020-03-02 ENCOUNTER — RX RENEWAL (OUTPATIENT)
Age: 85
End: 2020-03-02

## 2020-03-30 ENCOUNTER — RX RENEWAL (OUTPATIENT)
Age: 85
End: 2020-03-30

## 2020-04-27 ENCOUNTER — RX RENEWAL (OUTPATIENT)
Age: 85
End: 2020-04-27

## 2020-05-29 ENCOUNTER — RX RENEWAL (OUTPATIENT)
Age: 85
End: 2020-05-29

## 2020-06-05 NOTE — ED ADULT NURSE NOTE - INTEGUMENTARY WDL
Color consistent with ethnicity/race, warm, dry intact, resilient. I personally performed the service described in the documentation recorded by the scribe in my presence, and it accurately and completely records my words and actions.

## 2020-06-18 ENCOUNTER — APPOINTMENT (OUTPATIENT)
Dept: FAMILY MEDICINE | Facility: CLINIC | Age: 85
End: 2020-06-18
Payer: MEDICARE

## 2020-06-18 VITALS
SYSTOLIC BLOOD PRESSURE: 118 MMHG | DIASTOLIC BLOOD PRESSURE: 78 MMHG | BODY MASS INDEX: 19.04 KG/M2 | HEIGHT: 60 IN | WEIGHT: 97 LBS

## 2020-06-18 PROCEDURE — 36415 COLL VENOUS BLD VENIPUNCTURE: CPT

## 2020-06-18 PROCEDURE — 99214 OFFICE O/P EST MOD 30 MIN: CPT | Mod: 25

## 2020-06-18 PROCEDURE — 81002 URINALYSIS NONAUTO W/O SCOPE: CPT

## 2020-06-18 NOTE — PHYSICAL EXAM
[Normal] : normal gait, coordination grossly intact, no focal deficits and deep tendon reflexes were 2+ and symmetric [Comprehensive Foot Exam Normal] : Right and left foot were examined and both feet are normal. No ulcers in either foot. Toes are normal and with full ROM.  Normal tactile sensation with monofilament testing throughout both feet

## 2020-06-18 NOTE — HISTORY OF PRESENT ILLNESS
[FreeTextEntry8] : Pt. c/o loosing weight. Pt. is eating, no vomiting, no diarrhea. \par Thinks she has lost about 10 lbs. Depression is under control. No change in appetitie, although not eating as much cake and cookies and junk food.

## 2020-06-18 NOTE — PLAN
[FreeTextEntry1] : Ensure or other nutritional suppliment shakes\par If persists and pt desired can order CT abd and pelvis.
none

## 2020-06-19 LAB
ALBUMIN SERPL ELPH-MCNC: 4.5 G/DL
ALP BLD-CCNC: 79 U/L
ALT SERPL-CCNC: 11 U/L
ANION GAP SERPL CALC-SCNC: 16 MMOL/L
AST SERPL-CCNC: 19 U/L
BASOPHILS # BLD AUTO: 0.1 K/UL
BASOPHILS NFR BLD AUTO: 1.6 %
BILIRUB SERPL-MCNC: 0.3 MG/DL
BUN SERPL-MCNC: 23 MG/DL
CALCIUM SERPL-MCNC: 9.3 MG/DL
CHLORIDE SERPL-SCNC: 103 MMOL/L
CO2 SERPL-SCNC: 22 MMOL/L
CREAT SERPL-MCNC: 1.34 MG/DL
EOSINOPHIL # BLD AUTO: 0.14 K/UL
EOSINOPHIL NFR BLD AUTO: 2.3 %
ERYTHROCYTE [SEDIMENTATION RATE] IN BLOOD BY WESTERGREN METHOD: 12 MM/HR
FOLATE SERPL-MCNC: 8.2 NG/ML
GLUCOSE SERPL-MCNC: 87 MG/DL
HCT VFR BLD CALC: 39.1 %
HGB BLD-MCNC: 12 G/DL
IMM GRANULOCYTES NFR BLD AUTO: 0.2 %
IRON SATN MFR SERPL: 26 %
IRON SERPL-MCNC: 79 UG/DL
LYMPHOCYTES # BLD AUTO: 1.42 K/UL
LYMPHOCYTES NFR BLD AUTO: 23.1 %
MAN DIFF?: NORMAL
MCHC RBC-ENTMCNC: 29.8 PG
MCHC RBC-ENTMCNC: 30.7 GM/DL
MCV RBC AUTO: 97 FL
MONOCYTES # BLD AUTO: 0.58 K/UL
MONOCYTES NFR BLD AUTO: 9.4 %
NEUTROPHILS # BLD AUTO: 3.9 K/UL
NEUTROPHILS NFR BLD AUTO: 63.4 %
PLATELET # BLD AUTO: 228 K/UL
POTASSIUM SERPL-SCNC: 4.7 MMOL/L
PROT SERPL-MCNC: 6.7 G/DL
RBC # BLD: 4.03 M/UL
RBC # FLD: 13.4 %
SODIUM SERPL-SCNC: 141 MMOL/L
T3FREE SERPL-MCNC: 2.69 PG/ML
T4 FREE SERPL-MCNC: 1.2 NG/DL
TIBC SERPL-MCNC: 303 UG/DL
TSH SERPL-ACNC: 1.87 UIU/ML
UIBC SERPL-MCNC: 223 UG/DL
VIT B12 SERPL-MCNC: 444 PG/ML
WBC # FLD AUTO: 6.15 K/UL

## 2020-06-23 ENCOUNTER — OUTPATIENT (OUTPATIENT)
Dept: OUTPATIENT SERVICES | Facility: HOSPITAL | Age: 85
LOS: 1 days | End: 2020-06-23
Payer: MEDICARE

## 2020-06-23 ENCOUNTER — APPOINTMENT (OUTPATIENT)
Dept: RADIOLOGY | Facility: CLINIC | Age: 85
End: 2020-06-23
Payer: MEDICARE

## 2020-06-23 DIAGNOSIS — Z00.8 ENCOUNTER FOR OTHER GENERAL EXAMINATION: ICD-10-CM

## 2020-06-23 DIAGNOSIS — Z96.643 PRESENCE OF ARTIFICIAL HIP JOINT, BILATERAL: Chronic | ICD-10-CM

## 2020-06-23 DIAGNOSIS — S69.90XA UNSPECIFIED INJURY OF UNSPECIFIED WRIST, HAND AND FINGER(S), INITIAL ENCOUNTER: Chronic | ICD-10-CM

## 2020-06-23 DIAGNOSIS — Z98.890 OTHER SPECIFIED POSTPROCEDURAL STATES: Chronic | ICD-10-CM

## 2020-06-23 PROCEDURE — 71046 X-RAY EXAM CHEST 2 VIEWS: CPT | Mod: 26

## 2020-06-23 PROCEDURE — 71046 X-RAY EXAM CHEST 2 VIEWS: CPT

## 2020-07-08 ENCOUNTER — APPOINTMENT (OUTPATIENT)
Dept: CT IMAGING | Facility: CLINIC | Age: 85
End: 2020-07-08

## 2020-07-08 ENCOUNTER — OUTPATIENT (OUTPATIENT)
Dept: OUTPATIENT SERVICES | Facility: HOSPITAL | Age: 85
LOS: 1 days | End: 2020-07-08
Payer: MEDICARE

## 2020-07-08 ENCOUNTER — APPOINTMENT (OUTPATIENT)
Dept: CT IMAGING | Facility: CLINIC | Age: 85
End: 2020-07-08
Payer: MEDICARE

## 2020-07-08 DIAGNOSIS — S69.90XA UNSPECIFIED INJURY OF UNSPECIFIED WRIST, HAND AND FINGER(S), INITIAL ENCOUNTER: Chronic | ICD-10-CM

## 2020-07-08 DIAGNOSIS — Z98.890 OTHER SPECIFIED POSTPROCEDURAL STATES: Chronic | ICD-10-CM

## 2020-07-08 DIAGNOSIS — R63.4 ABNORMAL WEIGHT LOSS: ICD-10-CM

## 2020-07-08 DIAGNOSIS — Z96.643 PRESENCE OF ARTIFICIAL HIP JOINT, BILATERAL: Chronic | ICD-10-CM

## 2020-07-08 DIAGNOSIS — Z00.8 ENCOUNTER FOR OTHER GENERAL EXAMINATION: ICD-10-CM

## 2020-07-08 PROCEDURE — 74176 CT ABD & PELVIS W/O CONTRAST: CPT

## 2020-07-08 PROCEDURE — 74176 CT ABD & PELVIS W/O CONTRAST: CPT | Mod: 26

## 2020-07-08 PROCEDURE — 71250 CT THORAX DX C-: CPT

## 2020-07-08 PROCEDURE — 71250 CT THORAX DX C-: CPT | Mod: 26

## 2020-07-15 ENCOUNTER — RX RENEWAL (OUTPATIENT)
Age: 85
End: 2020-07-15

## 2020-08-28 ENCOUNTER — RX RENEWAL (OUTPATIENT)
Age: 85
End: 2020-08-28

## 2020-09-11 NOTE — ED ADULT NURSE NOTE - CCCP TRG CHIEF CMPLNT
Action Requested: Summary for Provider     []  Critical Lab, Recommendations Needed  [x] Need Additional Advice  []   FYI    []   Need Orders  [x] Need Medications Sent to Pharmacy  []  Other     SUMMARY:     Spoke with pt,  verified, pt c/o cough with fall

## 2020-09-13 ENCOUNTER — RX RENEWAL (OUTPATIENT)
Age: 85
End: 2020-09-13

## 2020-09-22 ENCOUNTER — APPOINTMENT (OUTPATIENT)
Dept: CARDIOLOGY | Facility: CLINIC | Age: 85
End: 2020-09-22
Payer: MEDICARE

## 2020-09-22 ENCOUNTER — NON-APPOINTMENT (OUTPATIENT)
Age: 85
End: 2020-09-22

## 2020-09-22 VITALS
RESPIRATION RATE: 14 BRPM | OXYGEN SATURATION: 96 % | DIASTOLIC BLOOD PRESSURE: 78 MMHG | WEIGHT: 97 LBS | BODY MASS INDEX: 19.04 KG/M2 | HEIGHT: 60 IN | SYSTOLIC BLOOD PRESSURE: 145 MMHG | HEART RATE: 87 BPM | TEMPERATURE: 98.4 F

## 2020-09-22 PROCEDURE — 93000 ELECTROCARDIOGRAM COMPLETE: CPT

## 2020-09-22 PROCEDURE — 99215 OFFICE O/P EST HI 40 MIN: CPT

## 2020-09-22 NOTE — REASON FOR VISIT
[Follow-Up - From Hospitalization] : follow-up of a recent hospitalization for [Chest Pain] : chest pain [Hyperlipidemia] : hyperlipidemia [FreeTextEntry1] : NSTEMI

## 2020-09-22 NOTE — DISCUSSION/SUMMARY
[FreeTextEntry1] : Coronary artery disease: Nonobstructive at catheterization with no progression in symptoms. Prior non-ST elevation MI. Currently stable on current medical therapy.\par Aortic valve disease: Moderate to severe aortic stenosis on last yr. echo. Repeat echocardiography to monitor.\par Hypertension: Well controlled on current regime. Continue low-sodium diet.\par Hyperlipidemia: Currently stable. Recent blood work shows excellent results.\par Followup 12 months.

## 2020-09-22 NOTE — PHYSICAL EXAM
[General Appearance - Well Developed] : well developed [Normal Appearance] : normal appearance [General Appearance - Well Nourished] : well nourished [Normal Conjunctiva] : the conjunctiva exhibited no abnormalities [Eyelids - No Xanthelasma] : the eyelids demonstrated no xanthelasmas [Normal Oral Mucosa] : normal oral mucosa [Normal Jugular Venous A Waves Present] : normal jugular venous A waves present [Normal Jugular Venous V Waves Present] : normal jugular venous V waves present [No Jugular Venous Faria A Waves] : no jugular venous faria A waves [Auscultation Breath Sounds / Voice Sounds] : lungs were clear to auscultation bilaterally [Abdomen Soft] : soft [Abdomen Mass (___ Cm)] : no abdominal mass palpated [Abnormal Walk] : normal gait [Nail Clubbing] : no clubbing of the fingernails [Cyanosis, Localized] : no localized cyanosis [] : no rash [Affect] : the affect was normal [Mood] : the mood was normal [No Anxiety] : not feeling anxious [5th Left ICS - MCL] : palpated at the 5th LICS in the midclavicular line [Hyperdynamic] : hyperdynamic [Normal Rate] : normal [Rhythm Regular] : regular [Normal S1] : normal S1 [Normal S2] : normal S2 [S4] : an S4 was heard [No Pitting Edema] : no pitting edema present [Bowel Sounds] : normal bowel sounds [S3] : no S3 [III] : a grade 3 [I] : a grade 1 [Right Carotid Bruit] : no bruit heard over the right carotid [Left Carotid Bruit] : no bruit heard over the left carotid [2+] : left 2+

## 2020-10-06 NOTE — PHYSICAL THERAPY INITIAL EVALUATION ADULT - REFERRING PHYSICIAN, REHAB EVAL
Situation:  Patient contacted writer with medication concerns.      Key Assessments:  Patient unclear as to why she has a $179 co pay for One Touch Verio test strips.  Patient reports midodrine dose that was sent to pharmacy by PCP is incorrect.  Patient is taking midodrine 15 mg TID and the prescription sent to pharmacy is 10 mg TID.  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    6/2/2020 [address] [facility] Keith Rojo MD   Outpatient Medication Detail     Disp Refills Start End    midodrine (PROAMATINE) 5 MG tablet 270 tablet 0 6/2/2020     Sig - Route: Take 3 tablets by mouth 3 times daily (before meals). - Oral    Sent to pharmacy as: Midodrine HCl 5 MG Oral Tablet (PROAMATINE)    Class: Eprescribe            Actions Taken:  Writer contacted pharmacy who reported there is a prescription for One Touch Verio test strips ready to be picked up for $13 co pay.  Reviewed last medication reconciliation completed 9/9/20 during [facility] and midodrine prescription is noted correctly in reconciliation.  Routed to PCP for approval to call in new midodrine dose.      Plan:  Follow up with patient regarding prescriptions and transfer care to [facility]      See hyperlinks within encounter for full documentation     Luis Felipe Palmer

## 2020-10-19 ENCOUNTER — APPOINTMENT (OUTPATIENT)
Dept: FAMILY MEDICINE | Facility: CLINIC | Age: 85
End: 2020-10-19
Payer: MEDICARE

## 2020-10-19 VITALS
TEMPERATURE: 98.1 F | HEART RATE: 98 BPM | HEIGHT: 60 IN | OXYGEN SATURATION: 90 % | WEIGHT: 98 LBS | SYSTOLIC BLOOD PRESSURE: 143 MMHG | DIASTOLIC BLOOD PRESSURE: 79 MMHG | BODY MASS INDEX: 19.24 KG/M2

## 2020-10-19 DIAGNOSIS — I44.7 LEFT BUNDLE-BRANCH BLOCK, UNSPECIFIED: ICD-10-CM

## 2020-10-19 DIAGNOSIS — F05 DELIRIUM DUE TO KNOWN PHYSIOLOGICAL CONDITION: ICD-10-CM

## 2020-10-19 DIAGNOSIS — Z23 ENCOUNTER FOR IMMUNIZATION: ICD-10-CM

## 2020-10-19 PROCEDURE — 99214 OFFICE O/P EST MOD 30 MIN: CPT | Mod: 25

## 2020-10-19 PROCEDURE — 90662 IIV NO PRSV INCREASED AG IM: CPT

## 2020-10-19 PROCEDURE — G0008: CPT

## 2020-10-19 RX ORDER — DESVENLAFAXINE 100 MG/1
100 TABLET, EXTENDED RELEASE ORAL
Qty: 90 | Refills: 0 | Status: DISCONTINUED | COMMUNITY
Start: 2020-07-15 | End: 2020-10-19

## 2020-10-19 RX ORDER — NORTRIPTYLINE HYDROCHLORIDE 10 MG/5ML
10 SOLUTION ORAL AT BEDTIME
Qty: 750 | Refills: 2 | Status: DISCONTINUED | COMMUNITY
Start: 2020-05-21 | End: 2020-10-19

## 2020-10-19 NOTE — PHYSICAL EXAM
[Normal] : normal gait, coordination grossly intact, no focal deficits and deep tendon reflexes were 2+ and symmetric [Alert and Oriented x3] : oriented to person, place, and time [Speech Grossly Normal] : speech grossly normal [Normal Mood] : the mood was normal [de-identified] : 2-3/6 syst murmur

## 2020-10-19 NOTE — HISTORY OF PRESENT ILLNESS
[Hyperlipidemia] : Hyperlipidemia [FreeTextEntry6] : pt is here to fill out paperwork for [facility] [Hypertension] : Hypertension [<140/90] : Target blood pressure is <140/90 [Does not check BP] : The patient is not checking blood pressure [Target goal met] : BP target goal met [Doing Well] : Patient is doing well [Managed with medications] : managed with  medication [Moderate Intensity Therapy] : Patient is currently on moderate intensity statin  therapy [Stable] : Overall status has been stable [No New Symptoms] : Patient denies any new symptoms

## 2020-10-20 LAB
ALBUMIN SERPL ELPH-MCNC: 3.9 G/DL
ALP BLD-CCNC: 83 U/L
ALT SERPL-CCNC: 6 U/L
ANION GAP SERPL CALC-SCNC: 11 MMOL/L
AST SERPL-CCNC: 20 U/L
BASOPHILS # BLD AUTO: 0.09 K/UL
BASOPHILS NFR BLD AUTO: 1.4 %
BILIRUB SERPL-MCNC: 0.3 MG/DL
BUN SERPL-MCNC: 19 MG/DL
CALCIUM SERPL-MCNC: 9.1 MG/DL
CHLORIDE SERPL-SCNC: 107 MMOL/L
CO2 SERPL-SCNC: 23 MMOL/L
CREAT SERPL-MCNC: 1.29 MG/DL
EOSINOPHIL # BLD AUTO: 0.21 K/UL
EOSINOPHIL NFR BLD AUTO: 3.4 %
GLUCOSE SERPL-MCNC: 102 MG/DL
HCT VFR BLD CALC: 42.1 %
HGB BLD-MCNC: 12.5 G/DL
IMM GRANULOCYTES NFR BLD AUTO: 0.3 %
LYMPHOCYTES # BLD AUTO: 1.14 K/UL
LYMPHOCYTES NFR BLD AUTO: 18.3 %
MAN DIFF?: NORMAL
MCHC RBC-ENTMCNC: 29.7 GM/DL
MCHC RBC-ENTMCNC: 30.4 PG
MCV RBC AUTO: 102.4 FL
MONOCYTES # BLD AUTO: 0.54 K/UL
MONOCYTES NFR BLD AUTO: 8.7 %
NEUTROPHILS # BLD AUTO: 4.22 K/UL
NEUTROPHILS NFR BLD AUTO: 67.9 %
PLATELET # BLD AUTO: 204 K/UL
POTASSIUM SERPL-SCNC: 5 MMOL/L
PROT SERPL-MCNC: 6.3 G/DL
RBC # BLD: 4.11 M/UL
RBC # FLD: 13.6 %
SODIUM SERPL-SCNC: 141 MMOL/L
T3FREE SERPL-MCNC: 2.89 PG/ML
T4 FREE SERPL-MCNC: 1.3 NG/DL
TSH SERPL-ACNC: 1.93 UIU/ML
WBC # FLD AUTO: 6.22 K/UL

## 2020-10-26 ENCOUNTER — RX RENEWAL (OUTPATIENT)
Age: 85
End: 2020-10-26

## 2020-11-17 ENCOUNTER — OUTPATIENT (OUTPATIENT)
Dept: OUTPATIENT SERVICES | Facility: HOSPITAL | Age: 85
LOS: 1 days | End: 2020-11-17
Payer: MEDICARE

## 2020-11-17 DIAGNOSIS — Z96.643 PRESENCE OF ARTIFICIAL HIP JOINT, BILATERAL: Chronic | ICD-10-CM

## 2020-11-17 DIAGNOSIS — Z98.890 OTHER SPECIFIED POSTPROCEDURAL STATES: Chronic | ICD-10-CM

## 2020-11-17 DIAGNOSIS — Z11.59 ENCOUNTER FOR SCREENING FOR OTHER VIRAL DISEASES: ICD-10-CM

## 2020-11-17 DIAGNOSIS — S69.90XA UNSPECIFIED INJURY OF UNSPECIFIED WRIST, HAND AND FINGER(S), INITIAL ENCOUNTER: Chronic | ICD-10-CM

## 2020-11-17 LAB — SARS-COV-2 RNA SPEC QL NAA+PROBE: SIGNIFICANT CHANGE UP

## 2020-11-17 PROCEDURE — U0003: CPT

## 2020-11-18 DIAGNOSIS — Z11.59 ENCOUNTER FOR SCREENING FOR OTHER VIRAL DISEASES: ICD-10-CM

## 2020-11-27 ENCOUNTER — RESULT REVIEW (OUTPATIENT)
Age: 85
End: 2020-11-27

## 2020-11-27 ENCOUNTER — APPOINTMENT (OUTPATIENT)
Dept: FAMILY MEDICINE | Facility: CLINIC | Age: 85
End: 2020-11-27
Payer: MEDICARE

## 2020-11-27 ENCOUNTER — OUTPATIENT (OUTPATIENT)
Dept: OUTPATIENT SERVICES | Facility: HOSPITAL | Age: 85
LOS: 1 days | End: 2020-11-27
Payer: MEDICARE

## 2020-11-27 ENCOUNTER — EMERGENCY (EMERGENCY)
Facility: HOSPITAL | Age: 85
LOS: 0 days | Discharge: HOME | End: 2020-11-27
Attending: EMERGENCY MEDICINE
Payer: MEDICARE

## 2020-11-27 ENCOUNTER — APPOINTMENT (OUTPATIENT)
Dept: ORTHOPEDIC SURGERY | Facility: CLINIC | Age: 85
End: 2020-11-27
Payer: MEDICARE

## 2020-11-27 ENCOUNTER — APPOINTMENT (OUTPATIENT)
Dept: RADIOLOGY | Facility: CLINIC | Age: 85
End: 2020-11-27
Payer: MEDICARE

## 2020-11-27 VITALS
BODY MASS INDEX: 19.24 KG/M2 | OXYGEN SATURATION: 97 % | HEART RATE: 90 BPM | HEIGHT: 60 IN | DIASTOLIC BLOOD PRESSURE: 87 MMHG | SYSTOLIC BLOOD PRESSURE: 151 MMHG | WEIGHT: 98 LBS | TEMPERATURE: 97.4 F

## 2020-11-27 VITALS
DIASTOLIC BLOOD PRESSURE: 79 MMHG | HEART RATE: 101 BPM | RESPIRATION RATE: 18 BRPM | TEMPERATURE: 100 F | SYSTOLIC BLOOD PRESSURE: 136 MMHG | OXYGEN SATURATION: 96 %

## 2020-11-27 VITALS — HEIGHT: 60 IN | WEIGHT: 110.01 LBS

## 2020-11-27 DIAGNOSIS — I25.2 OLD MYOCARDIAL INFARCTION: ICD-10-CM

## 2020-11-27 DIAGNOSIS — Z98.890 OTHER SPECIFIED POSTPROCEDURAL STATES: Chronic | ICD-10-CM

## 2020-11-27 DIAGNOSIS — Z96.643 PRESENCE OF ARTIFICIAL HIP JOINT, BILATERAL: ICD-10-CM

## 2020-11-27 DIAGNOSIS — M19.90 UNSPECIFIED OSTEOARTHRITIS, UNSPECIFIED SITE: ICD-10-CM
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
## 2020-11-27 DIAGNOSIS — F03.90 UNSPECIFIED DEMENTIA WITHOUT BEHAVIORAL DISTURBANCE: ICD-10-CM
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
## 2020-11-27 DIAGNOSIS — S69.90XA UNSPECIFIED INJURY OF UNSPECIFIED WRIST, HAND AND FINGER(S), INITIAL ENCOUNTER: Chronic | ICD-10-CM

## 2020-11-27 DIAGNOSIS — Z00.8 ENCOUNTER FOR OTHER GENERAL EXAMINATION: ICD-10-CM

## 2020-11-27 DIAGNOSIS — I48.0 PAROXYSMAL ATRIAL FIBRILLATION: ICD-10-CM

## 2020-11-27 DIAGNOSIS — M25.531 PAIN IN RIGHT WRIST: ICD-10-CM

## 2020-11-27 DIAGNOSIS — Z96.643 PRESENCE OF ARTIFICIAL HIP JOINT, BILATERAL: Chronic | ICD-10-CM

## 2020-11-27 DIAGNOSIS — E78.5 HYPERLIPIDEMIA, UNSPECIFIED: ICD-10-CM

## 2020-11-27 DIAGNOSIS — I10 ESSENTIAL (PRIMARY) HYPERTENSION: ICD-10-CM

## 2020-11-27 DIAGNOSIS — S52.501A UNSPECIFIED FRACTURE OF THE LOWER END OF RIGHT RADIUS, INITIAL ENCOUNTER FOR CLOSED FRACTURE: ICD-10-CM

## 2020-11-27 DIAGNOSIS — Z86.718 PERSONAL HISTORY OF OTHER VENOUS THROMBOSIS AND EMBOLISM: ICD-10-CM

## 2020-11-27 PROCEDURE — 73110 X-RAY EXAM OF WRIST: CPT | Mod: 26,RT

## 2020-11-27 PROCEDURE — 99214 OFFICE O/P EST MOD 30 MIN: CPT

## 2020-11-27 PROCEDURE — 73110 X-RAY EXAM OF WRIST: CPT | Mod: RT

## 2020-11-27 PROCEDURE — 25600 CLTX DST RDL FX/EPHYS SEP WO: CPT | Mod: RT

## 2020-11-27 PROCEDURE — 99284 EMERGENCY DEPT VISIT MOD MDM: CPT

## 2020-11-27 PROCEDURE — 73100 X-RAY EXAM OF WRIST: CPT | Mod: 26,50

## 2020-11-27 PROCEDURE — 99285 EMERGENCY DEPT VISIT HI MDM: CPT | Mod: 25

## 2020-11-27 PROCEDURE — 73100 X-RAY EXAM OF WRIST: CPT

## 2020-11-27 RX ORDER — CEPHALEXIN 500 MG
1 CAPSULE ORAL
Qty: 28 | Refills: 0
Start: 2020-11-27 | End: 2020-12-03

## 2020-11-27 NOTE — PHYSICAL EXAM
[Normal] : normal rate, regular rhythm, normal S1 and S2 and no murmur heard [de-identified] : right wrist slightly swollen with warmth, covered in ACE wrap. Tenderness noticed with movement of left wrist as well. All joint assessed.

## 2020-11-27 NOTE — PHYSICAL EXAM
[de-identified] : Right Wrist Physical Examination: \par \par Physical exam shows the patient to be alert and oriented x3, capable of ambulation.  The patient is well-developed and well-nourished in no apparent distress.  Skin is intact bilaterally in the upper extremities without lymphadenopathy at the elbows. \par \par Swelling: Positive\par \par *Wrist deformity present from the radius and ulnar fracture. Positive pain when palpating the distal radius and ulnar.\par \par Brachial Plexus:\par Spurlings: Negative\par Tinel's Signs Axilla: Negative\par Sensitivity Brachial Plexus Region: Negative\par \par Wrist ROM:\par Not evaluated due to the fractures.\par \par Tenderness: \par Scaphoid (Anatomical Snuff Box Tenderness): Negative\par Radial Ulnar Joint: Negative\par TFCC: Negative\par Pisotriquetral Joint: Negative\par Hamate Hook: Negative\par CMC Joint: Negative\par A1 Pulley: Negative with no digits affected\par \par Ligaments: \par Scapholunate Ligament: Negative\par Bautista's Test (Scapholunate Ligament Injury): Negative\par Lunotriquetral Ligament: Negative\par TFCC Instability: Negative\par \par Special Tests:\par Finkelstein's Test: Negative\par Tinel's/Phalen's Test: Negative\par Ulnar Nerve: Negative for Sensitivity\par \par There are 2+ pulses over the Radial Arteries with normal capillary refill.  Sensation intact in all nerves with 2 point discrimination 6mm.  There is 5/5 strength of the wrists bilaterally.   [de-identified] : X-rays of the right wrist taken in office today, 11/27/2020: Distal radius and ulnar fracture, displaced.

## 2020-11-27 NOTE — ED STATDOCS - NS_ ATTENDINGSCRIBEDETAILS _ED_A_ED_FT
I, Sabino Pierre MD,  performed the initial face to face bedside interview with this patient regarding history of present illness, review of symptoms and relevant past medical, social and family history.  I completed an independent physical examination.    The history, relevant review of systems, past medical and surgical history, medical decision making, and physical examination was documented by the scribe in my presence and I attest to the accuracy of the documentation.

## 2020-11-27 NOTE — ED STATDOCS - OBJECTIVE STATEMENT
89 y/o female with a PMHx of Aortic valve stenosis, CAD, Depression, DVT, HLD, HTN, LVH, OA, Mild dementia, Paroxysmal atrial fibrillation, presents to the ED c/o R wrist pain s/p fall at 1am today. Did not hit head. No LOC. No blood thinners. Pt was seen at Urgent Care, had an X rays of b/l hands and was seen with a fx and sent to the ED to see Dr. Jain. No hx of fractures before.

## 2020-11-27 NOTE — HISTORY OF PRESENT ILLNESS
[FreeTextEntry1] : Lary is a 89-year-old female presents with her daughter today in the office for evaluation of her right wrist. Early this morning she fell and fractured her right wrist. Her pain scale is 9/10. There is a deformity of her right wrist. She has no numbness or tingling in the right wrist and hand. No other complaints.

## 2020-11-27 NOTE — DISCUSSION/SUMMARY
[FreeTextEntry1] : Assessment: Right wrist displaced fracture.\par \par Plan:\par I spoke with Dr. Jain and reviewed the x-ray images with her. As per Dr. Jain the patient will head to [facility] ED to have the fracture reduced and splinted. As per Cristobal this will be a nonoperative case. The patient is to followup in the office in one week postreduction. The patient will use Tylenol as needed for pain. All of her and her daughter's questions were answered. They both understood the treatment at this time.

## 2020-11-27 NOTE — CONSULT NOTE ADULT - SUBJECTIVE AND OBJECTIVE BOX
89yFemale presents to [facility] c/o severe R wrist pain s/p mechanical fall at 1 am last night. Patient denies head hit or LOC. Localizing pain to distal radius. Denies radiation of pain. Pt denies numbness, tingling or burning.  Patient denies any other injuries. Pt lives at home with family. Pt had distal radius ORIF with Dr Jain in 2018.     PMH:  Paroxysmal atrial fibrillation    Left bundle branch block    MI, acute, non ST segment elevation    Depression    Mild dementia    HLD (hyperlipidemia)    Common bile duct stone    DVT (deep venous thrombosis)    OA (osteoarthritis)    MR (mitral regurgitation)    LVH (left ventricular hypertrophy)    Aortic valve stenosis, moderate    Coronary artery disease involving native coronary artery of native heart without angina pectoris    Pancreas disorder    Calculus of bile duct with cholangitis without obstruction    Depression    HTN (hypertension)    No pertinent past medical history      PSH:  H/O cardiac catheterization    Wrist injury    History of shoulder surgery    S/P ERCP    H/O bilateral hip replacements    No significant past surgical history      AH:    Meds: See med rec    T(C): 36.9 (11-27-20 @ 15:46)  HR: 100 (11-27-20 @ 15:46)  BP: 125/77 (11-27-20 @ 15:46)  RR: 16 (11-27-20 @ 15:46)  SpO2: 95% (11-27-20 @ 15:46)  Wt(kg): --    PE R UE:  Skin intact, visible deformity of wrist, + soft tissue swelling, mild ecchymosis; Decreased ROM of Wrist 2/2 pain. Normal Elbow/Shoulder ROM w/o pain. + TTP over DR/Ulna. + Rad Pulse 2+/4. SILT C5-T1, +AIN/PIN/Ulnar/Radial/Musc/Median, soft compartments;    LUE / B/L LE:  No bony TTP; Good ROM w/o pain. Able to SLR B/L and walk. Exam Unremarkable.     Imaging:  XRay R Wrist  3 views of R Wrist demonstrates R distal radius fracture. No other fx/dislocations noted.     Procedure Note:  After verbal consent obtained, ~ 10 cc of 1% Lidocaine injected into area around DR/Ulna as hematoma block. UE hung by fingers and reduction maneuver performed. There was a linear skin tear sustained during reduction maneuver measuring roughly 4 inches which was covered with bacitracin,  xeroform 4x4s and webril. Sugartong splint applied to Forearm/Wrist and mold held. MN XR obtained which show improved alignment of R DR Fracture. Pt NVI post procedure. Pt tolerated procedure well.     A/P: 89yFemale s/p Mech Fall w/ R Distal Radius Fracture  - Pain control  - Strict Ice/Elevation  - NWB R UE with splint and sling  - Keep splint clean/dry/intact;  - Encourage active finger motion to help with swelling  - Pt aware of possible need for surgical intervention of distal radius fracture. Will FU as outpatient  - Pt made aware of signs and symptoms of compartment syndrome. Aware of need to contact Doctor / Return to ED if symtoms arise.0  - All Pt's / Family Members questions answered, Pt/family understand plan.  - FU w/ Dr. Jain in 2-3 days  -Will discuss with Dr Jain and advise if plan changes    89[facility]/o severe R wrist pain s/p mechanical fall at 1 am last night. Patient denies head hit or LOC. Localizing pain to distal radius. Denies radiation of pain. Pt denies numbness, tingling or burning.  Patient denies any other injuries. Pt lives at home with family. Pt had distal radius ORIF with Dr Jain in 2018.     PMH:  Paroxysmal atrial fibrillation    Left bundle branch block    MI, acute, non ST segment elevation    Depression    Mild dementia    HLD (hyperlipidemia)    Common bile duct stone    DVT (deep venous thrombosis)    OA (osteoarthritis)    MR (mitral regurgitation)    LVH (left ventricular hypertrophy)    Aortic valve stenosis, moderate    Coronary artery disease involving native coronary artery of native heart without angina pectoris    Pancreas disorder    Calculus of bile duct with cholangitis without obstruction    Depression    HTN (hypertension)    No pertinent past medical history      PSH:  H/O cardiac catheterization    Wrist injury    History of shoulder surgery    S/P ERCP    H/O bilateral hip replacements    No significant past surgical history      AH:    Meds: See med rec    T(C): 36.9 (11-27-20 @ 15:46)  HR: 100 (11-27-20 @ 15:46)  BP: 125/77 (11-27-20 @ 15:46)  RR: 16 (11-27-20 @ 15:46)  SpO2: 95% (11-27-20 @ 15:46)  Wt(kg): --    PE R UE:  Skin intact, visible deformity of wrist, + soft tissue swelling, mild ecchymosis; Decreased ROM of Wrist 2/2 pain. Normal Elbow/Shoulder ROM w/o pain. + TTP over DR/Ulna. + Rad Pulse 2+/4. SILT C5-T1, +AIN/PIN/Ulnar/Radial/Musc/Median, soft compartments;    LUE / B/L LE:  No bony TTP; Good ROM w/o pain. Able to SLR B/L and walk. Exam Unremarkable.     Imaging:  XRay R Wrist  3 views of R Wrist demonstrates R distal radius fracture. No other fx/dislocations noted.     Procedure Note:  After verbal consent obtained, ~ 10 cc of 1% Lidocaine injected into area around DR/Ulna as hematoma block. UE hung by fingers and reduction maneuver performed. There was a linear skin tear sustained during reduction maneuver measuring roughly 4 inches which was covered with bacitracin,  xeroform 4x4s and webril. Sugartong splint applied to Forearm/Wrist and mold held. [place] XR obtained which show improved alignment of R DR Fracture. Pt NVI post procedure. Pt tolerated procedure well.     A/P: 89yFemale s/p Mech Fall w/ R Distal Radius Fracture  - Pain control  - Strict Ice/Elevation  -PO Keflex for skin tear   - NWB R UE with splint and sling  - Keep splint clean/dry/intact;  - Encourage active finger motion to help with swelling  - Pt aware of possible need for surgical intervention of distal radius fracture. Will FU as outpatient  - Pt made aware of signs and symptoms of compartment syndrome. Aware of need to contact Doctor / Return to ED if symtoms arise.0  - All Pt's / Family Members questions answered, Pt/family understand plan.  - FU w/ Dr. Jain in 2-3 days  -Will discuss with Dr Jain and advise if plan changes

## 2020-11-27 NOTE — ED ADULT NURSE NOTE - NSIMPLEMENTINTERV_GEN_ALL_ED
Implemented All Fall with Harm Risk Interventions:  [place] to call system. Call bell, personal items and telephone within reach. Instruct patient to call for assistance. Room bathroom lighting operational. Non-slip footwear when patient is off stretcher. Physically safe environment: no spills, clutter or unnecessary equipment. Stretcher in lowest position, wheels locked, appropriate side rails in place. Provide visual cue, wrist band, yellow gown, etc. Monitor gait and stability. Monitor for mental status changes and reorient to person, place, and time. Review medications for side effects contributing to fall risk. Reinforce activity limits and safety measures with patient and family. Provide visual clues: red socks.

## 2020-11-27 NOTE — REVIEW OF SYSTEMS
[Joint Pain] : joint pain [Joint Swelling] : joint swelling [Negative] : Psychiatric [FreeTextEntry9] : w

## 2020-11-27 NOTE — REVIEW OF SYSTEMS
[Joint Pain] : joint pain [Joint Stiffness] : joint stiffness [Negative] : Allergic/Immunologic [FreeTextEntry9] : Right wrist fracture/pain

## 2020-11-27 NOTE — HISTORY OF PRESENT ILLNESS
[FreeTextEntry1] : Lary Fulton presents today with daughter for fall this morning at 1:30am. Daughter was taking patient to the bathroom. Patient was following daughter with her walker and daughter look back and noticed patient had fallen. Patient denies any head injury, loss of consciousness, dizziness. She admits to pain in her right wrist.

## 2020-11-27 NOTE — ED STATDOCS - PROGRESS NOTE DETAILS
89 y/o female with a PMHx of Aortic valve stenosis, CAD, Depression, DVT, HLD, HTN, LVH, OA, Mild dementia, Paroxysmal atrial fibrillation, presents to the ED c/o R wrist pain s/p fall at 1am today. Did not hit head.  Pt reports she fell back onto outstretched Right hand.  (+) Swelling/ ecchymosis to right wrist.  NVI UE.  Called Ortho for eval and treatment with Dr. Jain as requested.  Allyson De La Paz PA-C Ortho performed reduction and splinted R wrist.  Sling was applied.  DC pt home with Motrin / Tylenol for pain.  Cont elevation and use sling during the day.    Ortho Resident called back after pt had left the ED to report that Dr. house did not want the patient to take the Keflex as prophlaxsis measure for abrasion.  I called the patient's daughter and informed her of new plan.  Allyson De La Paz PA-C

## 2020-11-27 NOTE — ED ADULT NURSE NOTE - OBJECTIVE STATEMENT
pt presents to ed from urgent care to see md house for bilat wrist fractures. pt reports falling this morning 1 am, denies LOC, denies head strike, not on anticoags. pt vitals stable, in no distress

## 2020-11-27 NOTE — PLAN
[FreeTextEntry1] : \par Will call back with imaging results. \par May need PT and/or Ortho referral.

## 2020-11-27 NOTE — ED STATDOCS - PATIENT PORTAL LINK FT
You can access the FollowMyHealth Patient Portal offered by [facility] by registering at the following website: [email or web address]. By joining [employer]’s FollowMyHealth portal, you will also be able to view your health information using other applications (apps) compatible with our system.

## 2020-11-28 ENCOUNTER — RX RENEWAL (OUTPATIENT)
Age: 85
End: 2020-11-28

## 2020-12-01 ENCOUNTER — APPOINTMENT (OUTPATIENT)
Dept: CARDIOLOGY | Facility: CLINIC | Age: 85
End: 2020-12-01
Payer: MEDICARE

## 2020-12-01 PROCEDURE — 93306 TTE W/DOPPLER COMPLETE: CPT

## 2020-12-02 ENCOUNTER — APPOINTMENT (OUTPATIENT)
Dept: ORTHOPEDIC SURGERY | Facility: CLINIC | Age: 85
End: 2020-12-02
Payer: MEDICARE

## 2020-12-02 PROCEDURE — 73110 X-RAY EXAM OF WRIST: CPT | Mod: RT

## 2020-12-02 PROCEDURE — 25600 CLTX DST RDL FX/EPHYS SEP WO: CPT | Mod: RT

## 2020-12-02 PROCEDURE — 99213 OFFICE O/P EST LOW 20 MIN: CPT | Mod: 57

## 2020-12-02 NOTE — HISTORY OF PRESENT ILLNESS
[FreeTextEntry1] : 11/27/20: Lary is a 89-year-old female presents with her daughter today in the office for evaluation of her right wrist. Early this morning she fell and fractured her right wrist. Her pain scale is 9/10. There is a deformity of her right wrist. She has no numbness or tingling in the right wrist and hand. No other complaints.\par \par 12/02/2020: Patient returns to the office today for repeat xrays and re-evaluation of her right wrist. She was seen at the ER on 11/27 for a closed reduction of her right wrist. She tolerated the procedure well.

## 2020-12-02 NOTE — PHYSICAL EXAM
[Normal Finger/nose] : finger to nose coordination [Normal] : no peripheral adenopathy appreciated [de-identified] : Right wrist in a long arm sugar tong splint. Fingers warm, mobile and sensate with swelling. [de-identified] : CECER [de-identified] : 3 xray views of the right wrist were obtained. There is good alignment of the distal radius fracture with maintenance of the reduction from last week.

## 2020-12-02 NOTE — ASSESSMENT
[FreeTextEntry1] : Patient with good alignment of the fracture site after reduction of the hospital last week. Reduction is well maintained. She will follow back up in one week for repeat x-rays and transition into a short arm cast. The patient is in agreement with this plan.

## 2020-12-09 ENCOUNTER — APPOINTMENT (OUTPATIENT)
Dept: ORTHOPEDIC SURGERY | Facility: CLINIC | Age: 85
End: 2020-12-09
Payer: MEDICARE

## 2020-12-09 PROCEDURE — 99024 POSTOP FOLLOW-UP VISIT: CPT

## 2020-12-09 PROCEDURE — 73110 X-RAY EXAM OF WRIST: CPT | Mod: RT

## 2020-12-09 PROCEDURE — 29125 APPL SHORT ARM SPLINT STATIC: CPT | Mod: 58,RT

## 2020-12-09 NOTE — HISTORY OF PRESENT ILLNESS
[FreeTextEntry1] : 11/27/20: Lary is a 89-year-old female presents with her daughter today in the office for evaluation of her right wrist. Early this morning she fell and fractured her right wrist. Her pain scale is 9/10. There is a deformity of her right wrist. She has no numbness or tingling in the right wrist and hand. No other complaints.\par \par 12/02/2020: Patient returns to the office today for repeat xrays and re-evaluation of her right wrist. She was seen at the ER on 11/27 for a closed reduction of her right wrist. She tolerated the procedure well. \par \par 12/09/2020: The patient returns to the office for repeat x-rays and reevaluation of her right wrist. She is tolerating her wrist splint well. Overall her pain is well-controlled. She is here for splint removal and possible cast placement. She denies paresthesias.

## 2020-12-09 NOTE — PROCEDURE
[] : right side. The other side left free, and demonstrate good range of motion. Pressure points carefully padded. Cast instructions given. All questions answered. [unfilled] tolerated procedure well. No complications.

## 2020-12-09 NOTE — ASSESSMENT
[FreeTextEntry1] : The patient is healing well from a right wrist fracture. Today a volar wrist splint was placed so attention could be paid to her dorsal forearm skin tear. She will return in one week for wound check and possible transition to a short arm cast. The patient is in agreement with this plan.

## 2020-12-09 NOTE — PHYSICAL EXAM
[Normal Finger/nose] : finger to nose coordination [Normal] : no peripheral adenopathy appreciated [de-identified] : Right wrist exam\par \par Skin with a large skin tear over the ulnar and dorsal aspects of the wrists that is approximately 1" x 5". No signs of infection. It is currently healing well. Mild deformity at the wrist. No tenderness on palpation. Sensation is grossly intact and distal pulses are 2+. [de-identified] : CECER [de-identified] : 3 xray views of the right wrist were obtained. There is good alignment of the distal radius fracture with maintenance of the reduction from last week.

## 2020-12-15 ENCOUNTER — APPOINTMENT (OUTPATIENT)
Dept: ORTHOPEDIC SURGERY | Facility: CLINIC | Age: 85
End: 2020-12-15
Payer: MEDICARE

## 2020-12-15 PROCEDURE — 29085 APPL CAST HAND&LWR FOREARM: CPT | Mod: RT,58

## 2020-12-15 PROCEDURE — 99024 POSTOP FOLLOW-UP VISIT: CPT

## 2020-12-15 PROCEDURE — 73110 X-RAY EXAM OF WRIST: CPT | Mod: 26,RT

## 2020-12-15 NOTE — PHYSICAL EXAM
[Normal Finger/nose] : finger to nose coordination [Normal] : no peripheral adenopathy appreciated [de-identified] : Right wrist exam\par \par Right wrist with a long dorsal skin tear that is clean and dry. It shows no signs of infection. The wrist with a slight deformity volarly. Tenderness around the distal radius is noted on palpation. Fingers are mobile but limited secondary to stiffness. Sensation is grossly intact. Distal pulses are 2+. [de-identified] : CECER [de-identified] : 3 xray views of the right wrist were obtained. There is good alignment of the distal radius fracture with maintenance of the reduction from last week.

## 2020-12-15 NOTE — HISTORY OF PRESENT ILLNESS
[FreeTextEntry1] : 11/27/20: Lary is a 89-year-old female presents with her daughter today in the office for evaluation of her right wrist. Early this morning she fell and fractured her right wrist. Her pain scale is 9/10. There is a deformity of her right wrist. She has no numbness or tingling in the right wrist and hand. No other complaints.\par \par 12/02/2020: Patient returns to the office today for repeat xrays and re-evaluation of her right wrist. She was seen at the ER on 11/27 for a closed reduction of her right wrist. She tolerated the procedure well. \par \par 12/09/2020: The patient returns to the office for repeat x-rays and reevaluation of her right wrist. She is tolerating her wrist splint well. Overall her pain is well-controlled. She is here for splint removal and possible cast placement. She denies paresthesias.\par \par 12/15/2020: Patient returns to the office today for repeat xrays and re-evaluation of her right wrist. She is currently in a short arm splint and has been tolerating it well. She presents for conversion to a short arm cast. She denies fevers, chills or paresthesias.

## 2020-12-15 NOTE — PROCEDURE
[] : right  [FreeTextEntry1] : Patient was placed in a short arm cast today. With the skin tear that she has on the dorsum of her forearm I would like for her to return next week for a cast change and wound reevaluation. She will remain nonweightbearing of the right wrist. The patient and her caregiver were in agreement with this plan.

## 2020-12-22 ENCOUNTER — APPOINTMENT (OUTPATIENT)
Dept: ORTHOPEDIC SURGERY | Facility: CLINIC | Age: 85
End: 2020-12-22
Payer: MEDICARE

## 2020-12-22 PROCEDURE — 99024 POSTOP FOLLOW-UP VISIT: CPT

## 2020-12-23 NOTE — PHYSICAL EXAM
[Normal Finger/nose] : finger to nose coordination [Normal] : no peripheral adenopathy appreciated [de-identified] : Right wrist exam\par \par Right wrist with a dorsal skin tear that is clean and dry, healing well. It shows no signs of infection. The wrist with a slight deformity volarly, unchanged. Tenderness around the distal radius is noted on palpation. Fingers are mobile but limited secondary to stiffness. Sensation is grossly intact. Distal pulses are 2+. [de-identified] : CECER

## 2020-12-23 NOTE — ASSESSMENT
[FreeTextEntry1] : 89-year-old female week status post closed reduction of a right distal radius fracture healing well. She was transitioned to a wrist immobilizer today that she will wear full-time except for bathing. She'll continue wound care with Xeroform and dry gauze, followup in 3 weeks for reevaluation.

## 2020-12-23 NOTE — HISTORY OF PRESENT ILLNESS
[FreeTextEntry1] : 11/27/20: Lary is a 89-year-old female presents with her daughter today in the office for evaluation of her right wrist. Early this morning she fell and fractured her right wrist. Her pain scale is 9/10. There is a deformity of her right wrist. She has no numbness or tingling in the right wrist and hand. No other complaints.\par \par 12/02/2020: Patient returns to the office today for repeat xrays and re-evaluation of her right wrist. She was seen at the ER on 11/27 for a closed reduction of her right wrist. She tolerated the procedure well. \par \par 12/09/2020: The patient returns to the office for repeat x-rays and reevaluation of her right wrist. She is tolerating her wrist splint well. Overall her pain is well-controlled. She is here for splint removal and possible cast placement. She denies paresthesias.\par \par 12/15/2020: Patient returns to the office today for repeat xrays and re-evaluation of her right wrist. She is currently in a short arm splint and has been tolerating it well. She presents for conversion to a short arm cast. She denies fevers, chills or paresthesias. \par \par 12/23/20: patient returns today for followup of her right distal radius fracture and for a wound check for her skin tear. Her pain is well controlled, she has no complaints today.

## 2021-01-13 ENCOUNTER — TRANSCRIPTION ENCOUNTER (OUTPATIENT)
Age: 86
End: 2021-01-13

## 2021-01-25 ENCOUNTER — APPOINTMENT (OUTPATIENT)
Dept: ORTHOPEDIC SURGERY | Facility: CLINIC | Age: 86
End: 2021-01-25
Payer: MEDICARE

## 2021-01-25 PROCEDURE — 99024 POSTOP FOLLOW-UP VISIT: CPT

## 2021-01-25 NOTE — HISTORY OF PRESENT ILLNESS
[FreeTextEntry1] : 11/27/20: Lary is a 89-year-old female presents with her daughter today in the office for evaluation of her right wrist. Early this morning she fell and fractured her right wrist. Her pain scale is 9/10. There is a deformity of her right wrist. She has no numbness or tingling in the right wrist and hand. No other complaints.\par \par 12/02/2020: Patient returns to the office today for repeat xrays and re-evaluation of her right wrist. She was seen at the ER on 11/27 for a closed reduction of her right wrist. She tolerated the procedure well. \par \par 12/09/2020: The patient returns to the office for repeat x-rays and reevaluation of her right wrist. She is tolerating her wrist splint well. Overall her pain is well-controlled. She is here for splint removal and possible cast placement. She denies paresthesias.\par \par 12/15/2020: Patient returns to the office today for repeat xrays and re-evaluation of her right wrist. She is currently in a short arm splint and has been tolerating it well. She presents for conversion to a short arm cast. She denies fevers, chills or paresthesias. \par \par 12/23/20: patient returns today for followup of her right distal radius fracture and for a wound check for her skin tear. Her pain is well controlled, she has no complaints today.\par \par 1/25/21: patient returns today for reevaluation of her right wrist fracture.  She notes she no longer has pain, and that her skin has healed. she has been wearing the brace at all times.

## 2021-01-25 NOTE — PHYSICAL EXAM
[Normal Finger/nose] : finger to nose coordination [Normal] : no peripheral adenopathy appreciated [de-identified] : Right wrist exam\par \par Right wrist with a dorsal skin tear that is now healed well. It shows no signs of infection. The wrist with a slight deformity volarly, unchanged. there is no tenderness along the fracture site. Range of motion of the digits is within normal limits, and she can form a composite fist. wrist ROM is slightly restricted due to stiffness.  Sensation is grossly intact. Distal pulses are 2+. [de-identified] : CECER

## 2021-01-25 NOTE — ASSESSMENT
[FreeTextEntry1] : 89-year-old female week status post closed reduction of a right distal radius fracture, radiographically and clinically healing well. \par She will begin to wean out of the wrist immobilizer for light activity at this time, and will return slowly to activities as tolerated in four weeks from now. \par She will follow up PRN.

## 2021-02-17 ENCOUNTER — APPOINTMENT (OUTPATIENT)
Dept: ORTHOPEDIC SURGERY | Facility: CLINIC | Age: 86
End: 2021-02-17
Payer: MEDICARE

## 2021-02-17 PROCEDURE — 73110 X-RAY EXAM OF WRIST: CPT | Mod: LT

## 2021-02-17 PROCEDURE — 99213 OFFICE O/P EST LOW 20 MIN: CPT | Mod: 24

## 2021-02-17 NOTE — ASSESSMENT
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 She was placed in a wrist immobilizer today that she will keep on full-time except for showering. I recommend CT scan of the left wrist and followup with Dr. Bell for further treatment.

## 2021-02-17 NOTE — PHYSICAL EXAM
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

## 2021-02-17 NOTE — HISTORY OF PRESENT ILLNESS
[FreeTextEntry1] : 89-year-old female presents for evaluation of left wrist pain. She denies specific trauma or inciting event.She complains of point tenderness at the distal radius that is worse with activity and improved with rest.

## 2021-02-19 ENCOUNTER — OUTPATIENT (OUTPATIENT)
Dept: OUTPATIENT SERVICES | Facility: HOSPITAL | Age: 86
LOS: 1 days | End: 2021-02-19
Payer: MEDICARE

## 2021-02-19 ENCOUNTER — APPOINTMENT (OUTPATIENT)
Dept: CT IMAGING | Facility: CLINIC | Age: 86
End: 2021-02-19
Payer: MEDICARE

## 2021-02-19 ENCOUNTER — RESULT REVIEW (OUTPATIENT)
Age: 86
End: 2021-02-19

## 2021-02-19 DIAGNOSIS — S69.90XA UNSPECIFIED INJURY OF UNSPECIFIED WRIST, HAND AND FINGER(S), INITIAL ENCOUNTER: Chronic | ICD-10-CM

## 2021-02-19 DIAGNOSIS — Z98.890 OTHER SPECIFIED POSTPROCEDURAL STATES: Chronic | ICD-10-CM

## 2021-02-19 DIAGNOSIS — Z96.643 PRESENCE OF ARTIFICIAL HIP JOINT, BILATERAL: Chronic | ICD-10-CM

## 2021-02-19 DIAGNOSIS — Z00.8 ENCOUNTER FOR OTHER GENERAL EXAMINATION: ICD-10-CM

## 2021-02-19 PROCEDURE — 73200 CT UPPER EXTREMITY W/O DYE: CPT

## 2021-02-19 PROCEDURE — G1004: CPT

## 2021-02-19 PROCEDURE — 76376 3D RENDER W/INTRP POSTPROCES: CPT | Mod: 26

## 2021-02-19 PROCEDURE — 76376 3D RENDER W/INTRP POSTPROCES: CPT

## 2021-02-19 PROCEDURE — 73200 CT UPPER EXTREMITY W/O DYE: CPT | Mod: 26,LT,MH

## 2021-02-22 ENCOUNTER — RX RENEWAL (OUTPATIENT)
Age: 86
End: 2021-02-22

## 2021-02-22 ENCOUNTER — APPOINTMENT (OUTPATIENT)
Dept: ORTHOPEDIC SURGERY | Facility: CLINIC | Age: 86
End: 2021-02-22
Payer: MEDICARE

## 2021-02-22 VITALS
DIASTOLIC BLOOD PRESSURE: 78 MMHG | WEIGHT: 98 LBS | HEIGHT: 60 IN | HEART RATE: 84 BPM | BODY MASS INDEX: 19.24 KG/M2 | SYSTOLIC BLOOD PRESSURE: 138 MMHG

## 2021-02-22 PROCEDURE — 99214 OFFICE O/P EST MOD 30 MIN: CPT | Mod: 24

## 2021-02-22 NOTE — PHYSICAL EXAM
[de-identified] : Physical Exam:\par General: Well appearing, no acute distress, A&O\par Neurologic: A&Ox3, No focal deficits\par Head: NCAT without abrasions, lacerations, or ecchymosis to head, face, or scalp\par Respiratory: Equal chest wall expansion bilaterally, no accessory muscle use\par Lymphatic: No lymphadenopathy palpated\par Skin: Warm and dry\par Psychiatric: Normal mood and affect\par \par LUE:\par SILT r/m/u\par Fires AIN/PIN/ulnar\par 2+ radial pulse\par brisk capillary refill\par Positive tenderness to palpation over the distal radius [de-identified] : Plain films of the left wrist were previously obtained and a CT scan was also reviewed.  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

## 2021-02-22 NOTE — HISTORY OF PRESENT ILLNESS
[de-identified] : The patient is a very pleasant 89-year-old female who presents with family members today for evaluation of left wrist pain.  Several years ago she suffered a distal radius fracture which was treated with ORIF.  She initially did well but had increased pain recently.  She was seen by Dr. Jain who is on maternity leave and so she was referred to see me.  She is wearing a wrist brace at the present time.  She states she has some mild discomfort with activity but is overall not terribly troubled by the nonunion.  The patient states the pain is made worse with activity and relieved with rest.  Aching, 3 out of 10 [Bending] : worsened by bending [Lifting] : worsened by lifting [Weight Bearing] : worsened by weight bearing [Recumbency] : relieved by recumbency [Rest] : relieved by rest

## 2021-02-22 NOTE — DISCUSSION/SUMMARY
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  We discussed that this can be surgically treated if she wishes.  She states that she does not have significant pain at the present time and as such would like to continue to try nonoperative management.  She wears a brace when she is symptomatic but is comfortable most of the time.  I discussed that it will likely not heal as it has been a nonunion for some time but unless she has pain or decreased function, we can continue to manage this nonoperatively.  If she does have symptoms in the future, I would be happy to see her back and we can get repeat x-rays and discuss surgery at that time.\par \par It was explained to the patient that any surgical procedure carries with it the risks of loss of limb or loss of life. Medical complications include but are not limited to death or disability from a heart attack, stroke, GI bleeding, thrombophlebitis and pulmonary embolism, sepsis, adverse reactions including death due to blood transfusions, allergy or adverse drug reaction. There are other rare, unknown and uncommon systemic conditions that could also adversely affect the systemic outcome. Local complications include but are not limited to wound dehiscence, deep infection, failure of fixation or reconstruction, damage to nerves and vessels which could be temporary or permanent, as well as other rare, uncommon and unknown local complications that may necessitate re-operation, more complex orthopaedic reconstructions or amputation.\par \par Osteopenia and osteoporosis are significant risk factors for fragility fractures. Given the type of fracture that has occurred, I would highly recommend that the patient followup with their primary care physician to discuss treatment for low bone mineral density. We discussed the benefits of these medications frequently far outweigh the small risks. Their primary care physician can call the office with any specific questions or concerns.\par \par The patient was given the opportunity to ask questions and all questions were answered to their satisfaction.\par \par Jovanni Bell MD\par Orthopaedic Trauma Surgeon\par [facility]\par [facility] [place]\par Director Orthopaedic Trauma, [facility]\par \par \par \par

## 2021-03-31 ENCOUNTER — RX RENEWAL (OUTPATIENT)
Age: 86
End: 2021-03-31

## 2021-03-31 ENCOUNTER — APPOINTMENT (OUTPATIENT)
Dept: FAMILY MEDICINE | Facility: CLINIC | Age: 86
End: 2021-03-31
Payer: MEDICARE

## 2021-03-31 VITALS
BODY MASS INDEX: 19.24 KG/M2 | HEIGHT: 60 IN | HEART RATE: 42 BPM | WEIGHT: 98 LBS | DIASTOLIC BLOOD PRESSURE: 70 MMHG | TEMPERATURE: 98.6 F | OXYGEN SATURATION: 96 % | SYSTOLIC BLOOD PRESSURE: 142 MMHG

## 2021-03-31 DIAGNOSIS — F41.1 GENERALIZED ANXIETY DISORDER: ICD-10-CM

## 2021-03-31 DIAGNOSIS — R10.9 UNSPECIFIED ABDOMINAL PAIN: ICD-10-CM

## 2021-03-31 DIAGNOSIS — I35.9 NONRHEUMATIC AORTIC VALVE DISORDER, UNSPECIFIED: ICD-10-CM
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
## 2021-03-31 DIAGNOSIS — K80.20 CALCULUS OF GALLBLADDER W/OUT CHOLECYSTITIS W/OUT OBSTRUCTION: ICD-10-CM

## 2021-03-31 DIAGNOSIS — M81.8 OTHER OSTEOPOROSIS W/OUT CURRENT PATHOLOGICAL FRACTURE: ICD-10-CM

## 2021-03-31 PROCEDURE — 36415 COLL VENOUS BLD VENIPUNCTURE: CPT

## 2021-03-31 PROCEDURE — 99214 OFFICE O/P EST MOD 30 MIN: CPT | Mod: 25

## 2021-03-31 RX ORDER — ARIPIPRAZOLE 2 MG/1
2 TABLET ORAL
Qty: 90 | Refills: 0 | Status: DISCONTINUED | COMMUNITY
Start: 2021-02-09

## 2021-03-31 NOTE — HISTORY OF PRESENT ILLNESS
[Depression] : Depression [Hyperlipidemia] : Hyperlipidemia [Hypertension] : Hypertension [Other: ___] : [unfilled] [FreeTextEntry6] : pt is here for medication  re-check. [Does not check BP] : The patient is not checking blood pressure [<140/90] : Target blood pressure is <140/90 [Target goal met] : BP target goal met [Doing Well] : Patient is doing well [Managed with medications] : managed with  medication [Moderate Intensity Therapy] : Patient is currently on moderate intensity statin  therapy [No New Symptoms] : Patient denies any new symptoms [Stable] : Overall status has been stable

## 2021-04-01 LAB
ALBUMIN SERPL ELPH-MCNC: 4 G/DL
ALP BLD-CCNC: 78 U/L
ALT SERPL-CCNC: 14 U/L
ANION GAP SERPL CALC-SCNC: 9 MMOL/L
AST SERPL-CCNC: 20 U/L
BASOPHILS # BLD AUTO: 0.07 K/UL
BASOPHILS NFR BLD AUTO: 1.1 %
BILIRUB SERPL-MCNC: 0.4 MG/DL
BUN SERPL-MCNC: 23 MG/DL
CALCIUM SERPL-MCNC: 9.6 MG/DL
CHLORIDE SERPL-SCNC: 108 MMOL/L
CHOLEST SERPL-MCNC: 139 MG/DL
CO2 SERPL-SCNC: 26 MMOL/L
CREAT SERPL-MCNC: 1.51 MG/DL
EOSINOPHIL # BLD AUTO: 0.12 K/UL
EOSINOPHIL NFR BLD AUTO: 1.9 %
GLUCOSE SERPL-MCNC: 71 MG/DL
HCT VFR BLD CALC: 41.3 %
HDLC SERPL-MCNC: 66 MG/DL
HGB BLD-MCNC: 12.5 G/DL
IMM GRANULOCYTES NFR BLD AUTO: 0.3 %
LDLC SERPL CALC-MCNC: 56 MG/DL
LYMPHOCYTES # BLD AUTO: 1.11 K/UL
LYMPHOCYTES NFR BLD AUTO: 17.2 %
MAN DIFF?: NORMAL
MCHC RBC-ENTMCNC: 29.8 PG
MCHC RBC-ENTMCNC: 30.3 GM/DL
MCV RBC AUTO: 98.3 FL
MONOCYTES # BLD AUTO: 0.65 K/UL
MONOCYTES NFR BLD AUTO: 10 %
NEUTROPHILS # BLD AUTO: 4.5 K/UL
NEUTROPHILS NFR BLD AUTO: 69.5 %
NONHDLC SERPL-MCNC: 73 MG/DL
PLATELET # BLD AUTO: 202 K/UL
POTASSIUM SERPL-SCNC: 4.9 MMOL/L
PROT SERPL-MCNC: 6.6 G/DL
RBC # BLD: 4.2 M/UL
RBC # FLD: 13.5 %
SODIUM SERPL-SCNC: 143 MMOL/L
T3FREE SERPL-MCNC: 3.12 PG/ML
T4 FREE SERPL-MCNC: 1.5 NG/DL
TRIGL SERPL-MCNC: 86 MG/DL
TSH SERPL-ACNC: 2.02 UIU/ML
WBC # FLD AUTO: 6.47 K/UL

## 2021-04-05 ENCOUNTER — APPOINTMENT (OUTPATIENT)
Dept: CARDIOLOGY | Facility: CLINIC | Age: 86
End: 2021-04-05
Payer: MEDICARE

## 2021-04-05 ENCOUNTER — NON-APPOINTMENT (OUTPATIENT)
Age: 86
End: 2021-04-05

## 2021-04-05 ENCOUNTER — INPATIENT (INPATIENT)
Facility: HOSPITAL | Age: 86
LOS: 2 days | Discharge: HOME HEALTH | DRG: 242 | End: 2021-04-08
Attending: HOSPITALIST | Admitting: HOSPITALIST
Payer: MEDICARE

## 2021-04-05 VITALS
OXYGEN SATURATION: 94 % | HEART RATE: 38 BPM | HEIGHT: 63 IN | WEIGHT: 98 LBS | DIASTOLIC BLOOD PRESSURE: 60 MMHG | SYSTOLIC BLOOD PRESSURE: 160 MMHG | BODY MASS INDEX: 17.36 KG/M2

## 2021-04-05 VITALS — HEART RATE: 38 BPM

## 2021-04-05 VITALS — WEIGHT: 104.94 LBS | HEIGHT: 60 IN

## 2021-04-05 DIAGNOSIS — Z98.890 OTHER SPECIFIED POSTPROCEDURAL STATES: Chronic | ICD-10-CM

## 2021-04-05 DIAGNOSIS — S69.90XA UNSPECIFIED INJURY OF UNSPECIFIED WRIST, HAND AND FINGER(S), INITIAL ENCOUNTER: Chronic | ICD-10-CM

## 2021-04-05 DIAGNOSIS — I35.0 NONRHEUMATIC AORTIC (VALVE) STENOSIS: ICD-10-CM

## 2021-04-05 DIAGNOSIS — R00.1 BRADYCARDIA, UNSPECIFIED: ICD-10-CM

## 2021-04-05 DIAGNOSIS — R06.00 DYSPNEA, UNSPECIFIED: ICD-10-CM

## 2021-04-05 DIAGNOSIS — Z96.643 PRESENCE OF ARTIFICIAL HIP JOINT, BILATERAL: Chronic | ICD-10-CM

## 2021-04-05 LAB
ALBUMIN SERPL ELPH-MCNC: 4 G/DL — SIGNIFICANT CHANGE UP (ref 3.3–5)
ALP SERPL-CCNC: 86 U/L — SIGNIFICANT CHANGE UP (ref 40–120)
ALT FLD-CCNC: 23 U/L — SIGNIFICANT CHANGE UP (ref 12–78)
ANION GAP SERPL CALC-SCNC: 3 MMOL/L — LOW (ref 5–17)
APPEARANCE UR: CLEAR — SIGNIFICANT CHANGE UP
APTT BLD: 30.1 SEC — SIGNIFICANT CHANGE UP (ref 27.5–35.5)
AST SERPL-CCNC: 20 U/L — SIGNIFICANT CHANGE UP (ref 15–37)
BASOPHILS # BLD AUTO: 0.07 K/UL — SIGNIFICANT CHANGE UP (ref 0–0.2)
BASOPHILS NFR BLD AUTO: 1 % — SIGNIFICANT CHANGE UP (ref 0–2)
BILIRUB SERPL-MCNC: 0.7 MG/DL — SIGNIFICANT CHANGE UP (ref 0.2–1.2)
BILIRUB UR-MCNC: NEGATIVE — SIGNIFICANT CHANGE UP
BUN SERPL-MCNC: 24 MG/DL — HIGH (ref 7–23)
CALCIUM SERPL-MCNC: 8.9 MG/DL — SIGNIFICANT CHANGE UP (ref 8.5–10.1)
CHLORIDE SERPL-SCNC: 109 MMOL/L — HIGH (ref 96–108)
CO2 SERPL-SCNC: 27 MMOL/L — SIGNIFICANT CHANGE UP (ref 22–31)
COLOR SPEC: YELLOW — SIGNIFICANT CHANGE UP
CREAT SERPL-MCNC: 1.76 MG/DL — HIGH (ref 0.5–1.3)
DIFF PNL FLD: ABNORMAL
EOSINOPHIL # BLD AUTO: 0.08 K/UL — SIGNIFICANT CHANGE UP (ref 0–0.5)
EOSINOPHIL NFR BLD AUTO: 1.1 % — SIGNIFICANT CHANGE UP (ref 0–6)
GLUCOSE SERPL-MCNC: 95 MG/DL — SIGNIFICANT CHANGE UP (ref 70–99)
GLUCOSE UR QL: NEGATIVE MG/DL — SIGNIFICANT CHANGE UP
HCT VFR BLD CALC: 40.3 % — SIGNIFICANT CHANGE UP (ref 34.5–45)
HGB BLD-MCNC: 12.7 G/DL — SIGNIFICANT CHANGE UP (ref 11.5–15.5)
IMM GRANULOCYTES NFR BLD AUTO: 0.3 % — SIGNIFICANT CHANGE UP (ref 0–1.5)
INR BLD: 1.03 RATIO — SIGNIFICANT CHANGE UP (ref 0.88–1.16)
KETONES UR-MCNC: ABNORMAL
LEUKOCYTE ESTERASE UR-ACNC: ABNORMAL
LYMPHOCYTES # BLD AUTO: 0.83 K/UL — LOW (ref 1–3.3)
LYMPHOCYTES # BLD AUTO: 11.4 % — LOW (ref 13–44)
MAGNESIUM SERPL-MCNC: 2.8 MG/DL — HIGH (ref 1.6–2.6)
MCHC RBC-ENTMCNC: 29.5 PG — SIGNIFICANT CHANGE UP (ref 27–34)
MCHC RBC-ENTMCNC: 31.5 GM/DL — LOW (ref 32–36)
MCV RBC AUTO: 93.7 FL — SIGNIFICANT CHANGE UP (ref 80–100)
MONOCYTES # BLD AUTO: 0.6 K/UL — SIGNIFICANT CHANGE UP (ref 0–0.9)
MONOCYTES NFR BLD AUTO: 8.3 % — SIGNIFICANT CHANGE UP (ref 2–14)
NEUTROPHILS # BLD AUTO: 5.65 K/UL — SIGNIFICANT CHANGE UP (ref 1.8–7.4)
NEUTROPHILS NFR BLD AUTO: 77.9 % — HIGH (ref 43–77)
NITRITE UR-MCNC: NEGATIVE — SIGNIFICANT CHANGE UP
NT-PROBNP SERPL-SCNC: 4880 PG/ML — HIGH (ref 0–450)
PH UR: 7 — SIGNIFICANT CHANGE UP (ref 5–8)
PLATELET # BLD AUTO: 206 K/UL — SIGNIFICANT CHANGE UP (ref 150–400)
POTASSIUM SERPL-MCNC: 5.3 MMOL/L — SIGNIFICANT CHANGE UP (ref 3.5–5.3)
POTASSIUM SERPL-SCNC: 5.3 MMOL/L — SIGNIFICANT CHANGE UP (ref 3.5–5.3)
PROT SERPL-MCNC: 7.9 GM/DL — SIGNIFICANT CHANGE UP (ref 6–8.3)
PROT UR-MCNC: 30 MG/DL
PROTHROM AB SERPL-ACNC: 12 SEC — SIGNIFICANT CHANGE UP (ref 10.6–13.6)
RBC # BLD: 4.3 M/UL — SIGNIFICANT CHANGE UP (ref 3.8–5.2)
RBC # FLD: 13.4 % — SIGNIFICANT CHANGE UP (ref 10.3–14.5)
SARS-COV-2 RNA SPEC QL NAA+PROBE: SIGNIFICANT CHANGE UP
SODIUM SERPL-SCNC: 139 MMOL/L — SIGNIFICANT CHANGE UP (ref 135–145)
SP GR SPEC: 1.01 — SIGNIFICANT CHANGE UP (ref 1.01–1.02)
TROPONIN I SERPL-MCNC: 0.02 NG/ML — SIGNIFICANT CHANGE UP (ref 0.01–0.04)
TROPONIN I SERPL-MCNC: 0.02 NG/ML — SIGNIFICANT CHANGE UP (ref 0.01–0.04)
UROBILINOGEN FLD QL: NEGATIVE MG/DL — SIGNIFICANT CHANGE UP
WBC # BLD: 7.25 K/UL — SIGNIFICANT CHANGE UP (ref 3.8–10.5)
WBC # FLD AUTO: 7.25 K/UL — SIGNIFICANT CHANGE UP (ref 3.8–10.5)

## 2021-04-05 PROCEDURE — 83735 ASSAY OF MAGNESIUM: CPT

## 2021-04-05 PROCEDURE — 93005 ELECTROCARDIOGRAM TRACING: CPT

## 2021-04-05 PROCEDURE — 33274 TCAT INSJ/RPL PERM LDLS PM: CPT

## 2021-04-05 PROCEDURE — 84484 ASSAY OF TROPONIN QUANT: CPT

## 2021-04-05 PROCEDURE — 71045 X-RAY EXAM CHEST 1 VIEW: CPT | Mod: 26

## 2021-04-05 PROCEDURE — C1769: CPT

## 2021-04-05 PROCEDURE — 93306 TTE W/DOPPLER COMPLETE: CPT

## 2021-04-05 PROCEDURE — C1786: CPT

## 2021-04-05 PROCEDURE — 93000 ELECTROCARDIOGRAM COMPLETE: CPT

## 2021-04-05 PROCEDURE — 99215 OFFICE O/P EST HI 40 MIN: CPT

## 2021-04-05 PROCEDURE — 85610 PROTHROMBIN TIME: CPT

## 2021-04-05 PROCEDURE — 93010 ELECTROCARDIOGRAM REPORT: CPT

## 2021-04-05 PROCEDURE — 97162 PT EVAL MOD COMPLEX 30 MIN: CPT | Mod: GP

## 2021-04-05 PROCEDURE — 85025 COMPLETE CBC W/AUTO DIFF WBC: CPT

## 2021-04-05 PROCEDURE — 80048 BASIC METABOLIC PNL TOTAL CA: CPT

## 2021-04-05 PROCEDURE — 87086 URINE CULTURE/COLONY COUNT: CPT

## 2021-04-05 PROCEDURE — 84443 ASSAY THYROID STIM HORMONE: CPT

## 2021-04-05 PROCEDURE — C1894: CPT

## 2021-04-05 PROCEDURE — C1732: CPT

## 2021-04-05 PROCEDURE — 99285 EMERGENCY DEPT VISIT HI MDM: CPT | Mod: CS,GC

## 2021-04-05 PROCEDURE — 36415 COLL VENOUS BLD VENIPUNCTURE: CPT

## 2021-04-05 PROCEDURE — 86769 SARS-COV-2 COVID-19 ANTIBODY: CPT

## 2021-04-05 PROCEDURE — 84100 ASSAY OF PHOSPHORUS: CPT

## 2021-04-05 PROCEDURE — 97116 GAIT TRAINING THERAPY: CPT | Mod: GP

## 2021-04-05 PROCEDURE — 71045 X-RAY EXAM CHEST 1 VIEW: CPT

## 2021-04-05 PROCEDURE — 99223 1ST HOSP IP/OBS HIGH 75: CPT | Mod: GC,AI

## 2021-04-05 PROCEDURE — C1889: CPT

## 2021-04-05 PROCEDURE — C1898: CPT

## 2021-04-05 RX ORDER — ASPIRIN/CALCIUM CARB/MAGNESIUM 324 MG
1 TABLET ORAL
Qty: 0 | Refills: 0 | DISCHARGE

## 2021-04-05 RX ORDER — DESVENLAFAXINE 50 MG/1
1 TABLET, EXTENDED RELEASE ORAL
Qty: 0 | Refills: 0 | DISCHARGE

## 2021-04-05 RX ORDER — CEFTRIAXONE 500 MG/1
1000 INJECTION, POWDER, FOR SOLUTION INTRAMUSCULAR; INTRAVENOUS EVERY 24 HOURS
Refills: 0 | Status: DISCONTINUED | OUTPATIENT
Start: 2021-04-06 | End: 2021-04-08

## 2021-04-05 RX ORDER — ARIPIPRAZOLE 15 MG/1
2 TABLET ORAL DAILY
Refills: 0 | Status: DISCONTINUED | OUTPATIENT
Start: 2021-04-05 | End: 2021-04-08

## 2021-04-05 RX ORDER — OXAZEPAM 10 MG
0 CAPSULE ORAL
Qty: 0 | Refills: 0 | DISCHARGE

## 2021-04-05 RX ORDER — SODIUM CHLORIDE 9 MG/ML
3 INJECTION INTRAMUSCULAR; INTRAVENOUS; SUBCUTANEOUS ONCE
Refills: 0 | Status: COMPLETED | OUTPATIENT
Start: 2021-04-05 | End: 2021-04-05

## 2021-04-05 RX ORDER — ARIPIPRAZOLE 15 MG/1
1 TABLET ORAL
Qty: 0 | Refills: 0 | DISCHARGE

## 2021-04-05 RX ORDER — ATORVASTATIN CALCIUM 80 MG/1
10 TABLET, FILM COATED ORAL AT BEDTIME
Refills: 0 | Status: DISCONTINUED | OUTPATIENT
Start: 2021-04-05 | End: 2021-04-08

## 2021-04-05 RX ORDER — CEFTRIAXONE 500 MG/1
INJECTION, POWDER, FOR SOLUTION INTRAMUSCULAR; INTRAVENOUS
Refills: 0 | Status: DISCONTINUED | OUTPATIENT
Start: 2021-04-05 | End: 2021-04-05

## 2021-04-05 RX ORDER — CEFTRIAXONE 500 MG/1
1000 INJECTION, POWDER, FOR SOLUTION INTRAMUSCULAR; INTRAVENOUS ONCE
Refills: 0 | Status: COMPLETED | OUTPATIENT
Start: 2021-04-05 | End: 2021-04-05

## 2021-04-05 RX ORDER — HYDRALAZINE HCL 50 MG
10 TABLET ORAL ONCE
Refills: 0 | Status: DISCONTINUED | OUTPATIENT
Start: 2021-04-05 | End: 2021-04-08

## 2021-04-05 RX ORDER — CHOLECALCIFEROL (VITAMIN D3) 125 MCG
1 CAPSULE ORAL
Qty: 0 | Refills: 0 | DISCHARGE

## 2021-04-05 RX ORDER — MIRTAZAPINE 45 MG/1
45 TABLET, ORALLY DISINTEGRATING ORAL AT BEDTIME
Refills: 0 | Status: DISCONTINUED | OUTPATIENT
Start: 2021-04-05 | End: 2021-04-08

## 2021-04-05 RX ADMIN — SODIUM CHLORIDE 3 MILLILITER(S): 9 INJECTION INTRAMUSCULAR; INTRAVENOUS; SUBCUTANEOUS at 17:35

## 2021-04-05 RX ADMIN — CEFTRIAXONE 1000 MILLIGRAM(S): 500 INJECTION, POWDER, FOR SOLUTION INTRAMUSCULAR; INTRAVENOUS at 23:57

## 2021-04-05 NOTE — H&P ADULT - NSHPSOCIALHISTORY_GEN_ALL_CORE
Lives w/ daughter Alvina. Drinks a wine/beer once in a while, <1x/month. Does not smoke or use recreational drugs.

## 2021-04-05 NOTE — ED PROVIDER NOTE - OBJECTIVE STATEMENT
89 y/o F PMHx paroxysmal Afib, LBBB, MI, mild dementia, DVT, HLD, MT, LVH, aortic valve stenosis, CAD, pancreas disorder presents to ED due to complaints of lightheadedness and dizziness. Pt was sent by MD for pacemaker. Per daughter, pt has been feeling SOB with exertion the last week or so worsening. Pt is on beta blocker. 89 y/o F PMHx paroxysmal Afib, LBBB, MI, mild dementia, DVT, HLD, MT, LVH, aortic valve stenosis, CAD, pancreas disorder presents to ED due to complaints of lightheadedness and dizziness. Pt was sent by MD for pacemaker. Per daughter, pt has been feeling SOB with exertion the last week or so worsening. Pt is on beta blocker. Nonsmoker nondrinker no drugs. Pt unable to give names of meds.

## 2021-04-05 NOTE — CONSULT NOTE ADULT - ATTENDING COMMENTS
agree with A/P as above, pt with CHB and stable junctional rhythm at 40-405s bpm, stop metoprolol. MICRA implant tomorrowo. NPO after midnight

## 2021-04-05 NOTE — H&P ADULT - ASSESSMENT
89F w/ PMHx of paroxysmal Afib, LBBB, MI, aortic stenosis, DVT, HTN, HLD, mild dementia was sent in by cardiologist for complete heart block.     #Complete heart block   - admit to CICU   - per EP: discontinue AV mable blocking agents, pacer pads on overnight, may give dopamine if symptomatic, plan for pacemaker in the morning  - NPO after midnight   - f/u CXR    #PAF  - rate controlled  - not on AC  - HasBled risk 4-5.8%  - will continue to monitor off AC    #MUKESH on CKD3  - likely AIN vs infectious given urine WBC clumps  - BUN/Cr < 2   - d/c PPI  - f/u urine culture, pt not septic     #HTN  - chronic, uncontrolled  - continue amlodipine     89F w/ PMHx of paroxysmal Afib, LBBB, MI, aortic stenosis, DVT, HTN, HLD, mild dementia was sent in by cardiologist for complete heart block.     #Complete heart block   - admit to CICU   - trop negative  - per EP: discontinue AV mable blocking agents, pacer pads on overnight, may give dopamine if symptomatic, plan for pacemaker in the morning  - NPO after midnight   - f/u CXR    #PAF  - rate controlled  - not on AC  - HasBled risk 4-5.8%  - will continue to monitor off AC    #MUKESH on CKD3  - likely AIN vs infectious given urine WBC clumps  - BUN/Cr < 2   - d/c PPI  - f/u urine culture, pt not septic     #HTN  - chronic, uncontrolled  - asymptomatic   - continue amlodipine?    #Depression  - continue mirtazapine  - do not carry desvenlafaxine, may bring from home if hospital stay prolonged  - continue buspirone    #DVT PPx  - IMPROVE Score 1  - bed rest for time being given bradycardia, will order SCDs    #GOC  - full code for time being (see detailed discussion above)    #Dispo  - likely will be discharged to home when medically stable     d/w Dr. Barber     89F w/ PMHx of paroxysmal Afib, LBBB, MI, aortic stenosis, DVT, HTN, HLD, mild dementia was sent in by cardiologist for complete heart block.     #Complete heart block   - admit to CICU   - trop negative  - per EP: discontinue AV mable blocking agents, pacer pads on overnight, may give dopamine if symptomatic, plan for pacemaker in the morning  - NPO after midnight   - f/u AM mag, k, phos  - f/u CXR  - f/u echo     #PAF  - rate controlled  - not on AC  - HasBled risk 4-5.8%  - will continue to monitor off AC    #MUKESH on CKD3  - likely AIN vs infectious given urine WBC clumps  - given symptom of increased frequency, will treat for UTI   - ceftriaxone 1g q24hr  - BUN/Cr < 2   - d/c PPI  - f/u urine culture    #HTN  - chronic, uncontrolled  - asymptomatic   - IV hydralazine for SBP > 180  - hold amlodipine and metoprolol for AV mable activity     #Depression  - continue mirtazapine  - do not carry desvenlafaxine, may bring from home if hospital stay prolonged  - continue buspirone    #DVT PPx  - IMPROVE Score 1  - bed rest for time being given bradycardia, will order SCDs    #GOC  - full code for time being (see detailed discussion above)    #Dispo  - likely will be discharged to home when medically stable     d/w Dr. Barber

## 2021-04-05 NOTE — PHYSICAL EXAM
[General Appearance - Well Developed] : well developed [Normal Appearance] : normal appearance [General Appearance - Well Nourished] : well nourished [Normal Conjunctiva] : the conjunctiva exhibited no abnormalities [Eyelids - No Xanthelasma] : the eyelids demonstrated no xanthelasmas [Normal Oral Mucosa] : normal oral mucosa [Normal Jugular Venous A Waves Present] : normal jugular venous A waves present [Normal Jugular Venous V Waves Present] : normal jugular venous V waves present [No Jugular Venous Faria A Waves] : no jugular venous faria A waves [Auscultation Breath Sounds / Voice Sounds] : lungs were clear to auscultation bilaterally [Bowel Sounds] : normal bowel sounds [Abdomen Soft] : soft [Abdomen Mass (___ Cm)] : no abdominal mass palpated [Abnormal Walk] : normal gait [Nail Clubbing] : no clubbing of the fingernails [Cyanosis, Localized] : no localized cyanosis [] : no rash [Affect] : the affect was normal [Mood] : the mood was normal [No Anxiety] : not feeling anxious [5th Left ICS - MCL] : palpated at the 5th LICS in the midclavicular line [Hyperdynamic] : hyperdynamic [Normal Rate] : normal [Rhythm Regular] : regular [Normal S1] : normal S1 [Normal S2] : normal S2 [S4] : an S4 was heard [III] : a grade 3 [I] : a grade 1 [2+] : right 2+ [No Pitting Edema] : no pitting edema present [S3] : no S3 [Right Carotid Bruit] : no bruit heard over the right carotid [Left Carotid Bruit] : no bruit heard over the left carotid

## 2021-04-05 NOTE — H&P ADULT - NSHPREVIEWOFSYSTEMS_GEN_ALL_CORE
Gen: no fevers, chills, sweats, weight loss, fatigue  Visual: no recent changes in vision, no blurriness, no seeing spots  Cardiovascular: no chest pain, no palpitations, no orthopnea, no leg swelling  Respiratory: +SOB/BARNETT, no cough, no rhinorrhea, no nasal congestion  GI: no difficulty swallowing, no nausea, no vomiting, no abdominal pain, no diarrhea, no constipation, no melana  : +frequency, no dysuria, no hematuria, no malodorous urine  Derm: no wounds, no rashes  Heme: no easy bleeding or bruising  MSK: no joint pain, no joint swelling or redness, no extremity pain   Neuro: no headache, no numbness, no weakness, no memory loss

## 2021-04-05 NOTE — REASON FOR VISIT
[Follow-Up - Clinic] : a clinic follow-up of [Abnormal ECG] : an abnormal ECG [Dyspnea] : dyspnea [Hyperlipidemia] : hyperlipidemia

## 2021-04-05 NOTE — CONSULT NOTE ADULT - ASSESSMENT
A/P: 89 year old female with history of HTN on Metoprolol, PAF not on OAC, dementia, LBBB with hemodynamically stable complete heart block.    Admit to CICU tele medicine  Check labs  CXR  COVID swab  Pacing pads on  Can give Dopamine gtt 5-10mcg for symptomatic bradycardia  Discontinue Metoprolol  Avoid any AV mable blockers for BP control  Will plan for Micra AV pacemaker implant tomorrow 4/6  NPO after midnight  Plan discussed with ED staff, patient's family and Dr. Her

## 2021-04-05 NOTE — H&P ADULT - HISTORY OF PRESENT ILLNESS
89F w/ PMHx of paroxysmal Afib, LBBB, MI, aortic stenosis, DVT, HLD, mild dementia was sent in by cardiologist for complete heart block. Pt went for routine cardio f/u and was found to have complete heart block. Of note, pt reports dizziness and lightheadedness. Per chart, daughter reports pt has been complaining of SOB/BARNETT.  89F w/ PMHx of paroxysmal Afib, LBBB, MI, aortic stenosis, DVT, HLD, mild dementia was sent in by cardiologist for complete heart block. Per pt, she has been feeling SOB x 2 weeks. SOB worsened by exertion. Denies chest pain, orthopnea, PND, lower extremity swelling, cough, fever, chills. Denies dizziness and lightheadedness. Due to symptoms, she went to get checked up by cardio and was found to have complete heart block and sent in. Pt reports at baseline, she walks around her neighborhood occasionally with her cane/walker. Has not been able to walk with her SOB. Per daughter, pt has not been eating or drinking much.

## 2021-04-05 NOTE — H&P ADULT - ATTENDING COMMENTS
89 year old female patient with narrative as previously stated found to bradycardic        -Admit to  CICU        # Bradycardia  -pt with EKG concerning for 2/3rd block  -monitor on tele  -check mg, phos, tsh  -get morning echo  -will give dopamine prn if pt becomes symptomatic  -tentatively scheduled for PPM in the morning  -EP following      # Hx of Afib  -currently not on anticoagulation  -hold BB or CCB    # HTN  -hold norvasc  -give hydralazine pushes for bp control    # Advanced Directives  -Full code

## 2021-04-05 NOTE — DISCUSSION/SUMMARY
[FreeTextEntry1] : \par BARNETT worsening with bradycardia with complete AV block , symptomatic ,old LBBB , advised the patient to go to ER , PPM placement  EP department informed , discussed with daughter \par \par BARNETT possibly contributed by severe AS will address once PPM is done  \par \par Coronary artery disease: Nonobstructive at catheterization with no progression in symptoms. Prior non-ST elevation MI. Currently stable on current medical therapy.\par Hypertension: Well controlled on current regime. Continue low-sodium diet.\par Hyperlipidemia: Currently stable. Recent blood work shows excellent results.\par

## 2021-04-05 NOTE — ED ADULT NURSE REASSESSMENT NOTE - NS ED NURSE REASSESS COMMENT FT1
pt resting comfortably in bed at this time. Dr García re-made aware of pt HR of 37-40s, blood pressure of 170s-190s systolic, no medications ordered at this time. Pt resting comfortably in bed at this time

## 2021-04-05 NOTE — H&P ADULT - NSICDXPASTSURGICALHX_GEN_ALL_CORE_FT
PAST SURGICAL HISTORY:  H/O bilateral hip replacements 2010 , 2016    H/O cardiac catheterization Daughter reports it was years ago , no intervention required    History of shoulder surgery b/l    S/P ERCP 11/2018    Wrist injury b/l wrist surgery

## 2021-04-05 NOTE — ED ADULT TRIAGE NOTE - CHIEF COMPLAINT QUOTE
pt presents to ED due to complaints of lightheadedness and dizziness pt sent by MD for pacemaker HR in triage 38 as per daughter pt has been feeling SOB with exertion the last week or so worsening

## 2021-04-05 NOTE — H&P ADULT - CONVERSATION DETAILS
Pt unsure if she wants to be DNR. Pt reports she is not ready to make a decision at this time and must talk with her son. Per discussion w/ daughter, they initially want full code and then mentioned they do not want her to suffer. After discussion that CPR in elderly pts commonly result in fractured ribs, daughter reports she will talk further with her brother about it. Discussed w/ daughter that currently, her mother has mental capacity and until she decides otherwise, she will be full code.

## 2021-04-05 NOTE — ED PROVIDER NOTE - ATTENDING CONTRIBUTION TO CARE
Pt eval, treatment and plan contemporaneously with resident Alexandra. Agree with plan. Raquel JIMENEZ

## 2021-04-05 NOTE — H&P ADULT - NSICDXPASTMEDICALHX_GEN_ALL_CORE_FT
PAST MEDICAL HISTORY:  Aortic valve stenosis, moderate     Calculus of bile duct with cholangitis without obstruction     Common bile duct stone     Coronary artery disease involving native coronary artery of native heart without angina pectoris     Depression     Depression     DVT (deep venous thrombosis) 2016 right leg    HLD (hyperlipidemia)     HTN (hypertension)     Left bundle branch block     LVH (left ventricular hypertrophy)     MI, acute, non ST segment elevation Family denies, (information in allscripts)    Mild dementia     MR (mitral regurgitation)     OA (osteoarthritis)     Pancreas disorder     Paroxysmal atrial fibrillation

## 2021-04-05 NOTE — HISTORY OF PRESENT ILLNESS
[FreeTextEntry1] : 89 year old female with hx HTN AS, LBBB who came with complain of having worsening of shortness of breath on activity for last couple of weeks , without chest pain , patient denies any weight gain , Patient blood pressure is mild elevated ,  \par \par \par   Last years echo shows some progression in AV disease with moderate to severe AS by echo parameters.. Denies chest discomfort, lower extremity edema, palpitations, paroxysmal nocturnal dyspnea, presyncope or syncope. \par \par Patient EKG showed high degree av block , with underlying LBBB

## 2021-04-05 NOTE — ED PROVIDER NOTE - PROGRESS NOTE DETAILS
EP at bedside. Pt appears stable. Has pacer pads on chest. Per EP, pt is on b blocker. Will hold bblocker. Will admit to CICU. Raquel JIMENEZ

## 2021-04-05 NOTE — ED PROVIDER NOTE - CARE PLAN
Principal Discharge DX:	Symptomatic bradycardia   Principal Discharge DX:	Symptomatic bradycardia  Secondary Diagnosis:	Complete heart block

## 2021-04-05 NOTE — ED PROVIDER NOTE - CLINICAL SUMMARY MEDICAL DECISION MAKING FREE TEXT BOX
Pt w/ PMHx paroxysmal Afib, LBBB, MI, mild dementia, DVT, HLD, MT, LVH, aortic valve stenosis, CAD, pancreas disorder on beta blocker here w/ complete heart block. Pt appears stable w/ adequate BP, no CP, no SOB, electrophysiology saw pt, to be admitted to CICU w/ pacer pads.

## 2021-04-05 NOTE — H&P ADULT - NSHPPHYSICALEXAM_GEN_ALL_CORE
Vitals  T(F): 98 (04-05-21 @ 20:00), Max: 98.3 (04-05-21 @ 16:44)  HR: 44 (04-05-21 @ 20:00) (37 - 44)  BP: 153/64 (04-05-21 @ 20:00) (153/64 - 196/46)  RR: 20 (04-05-21 @ 20:00) (18 - 20)  SpO2: 98% (04-05-21 @ 20:00) (94% - 100%)    Physical Exam   Gen: NAD, comfortable  HENT: atraumatic head and ears, no gross abnormalities of ears, mucous membranes moist, no oral lesions, neck supple without masses/goiter/lymphadenopathy  CV: regular, but slow rate, 4/6 systolic murmur heard at LLSB (unable to auscultate RUSB & LUSB due to pacer pads)  Pulm: nl respiratory effort, CTAB, no wheezes/crackles/rhonchi  Back: +kyphosis, no spinal tenderness, no CVA tenderness  Abd: soft, nontender, nondistended, no rebound, no guarding, no masses  Extremities: no pedal edema, pedal pulses palpable   Skin: nl warm and dry  Neuro: A&Ox3, answering questions appropriately, PERRL, EOMI, face symmetric, no dysarthria, 5/5 strength in upper and lower extremities bilaterally, sensation intact in upper and lower extremities bilaterally

## 2021-04-06 DIAGNOSIS — I10 ESSENTIAL (PRIMARY) HYPERTENSION: ICD-10-CM

## 2021-04-06 DIAGNOSIS — R06.00 DYSPNEA, UNSPECIFIED: ICD-10-CM

## 2021-04-06 DIAGNOSIS — E78.5 HYPERLIPIDEMIA, UNSPECIFIED: ICD-10-CM

## 2021-04-06 DIAGNOSIS — I35.0 NONRHEUMATIC AORTIC (VALVE) STENOSIS: ICD-10-CM

## 2021-04-06 DIAGNOSIS — I44.2 ATRIOVENTRICULAR BLOCK, COMPLETE: ICD-10-CM

## 2021-04-06 LAB
ANION GAP SERPL CALC-SCNC: 3 MMOL/L — LOW (ref 5–17)
BASOPHILS # BLD AUTO: 0.06 K/UL — SIGNIFICANT CHANGE UP (ref 0–0.2)
BASOPHILS NFR BLD AUTO: 1 % — SIGNIFICANT CHANGE UP (ref 0–2)
BUN SERPL-MCNC: 19 MG/DL — SIGNIFICANT CHANGE UP (ref 7–23)
CALCIUM SERPL-MCNC: 8.6 MG/DL — SIGNIFICANT CHANGE UP (ref 8.5–10.1)
CHLORIDE SERPL-SCNC: 111 MMOL/L — HIGH (ref 96–108)
CO2 SERPL-SCNC: 26 MMOL/L — SIGNIFICANT CHANGE UP (ref 22–31)
COVID-19 SPIKE DOMAIN AB INTERP: NEGATIVE — SIGNIFICANT CHANGE UP
COVID-19 SPIKE DOMAIN ANTIBODY RESULT: 0.4 U/ML — SIGNIFICANT CHANGE UP
CREAT SERPL-MCNC: 1.48 MG/DL — HIGH (ref 0.5–1.3)
EOSINOPHIL # BLD AUTO: 0.05 K/UL — SIGNIFICANT CHANGE UP (ref 0–0.5)
EOSINOPHIL NFR BLD AUTO: 0.8 % — SIGNIFICANT CHANGE UP (ref 0–6)
GLUCOSE SERPL-MCNC: 94 MG/DL — SIGNIFICANT CHANGE UP (ref 70–99)
HCT VFR BLD CALC: 35.2 % — SIGNIFICANT CHANGE UP (ref 34.5–45)
HGB BLD-MCNC: 11.5 G/DL — SIGNIFICANT CHANGE UP (ref 11.5–15.5)
IMM GRANULOCYTES NFR BLD AUTO: 0.2 % — SIGNIFICANT CHANGE UP (ref 0–1.5)
INR BLD: 1.17 RATIO — HIGH (ref 0.88–1.16)
LYMPHOCYTES # BLD AUTO: 0.83 K/UL — LOW (ref 1–3.3)
LYMPHOCYTES # BLD AUTO: 13.8 % — SIGNIFICANT CHANGE UP (ref 13–44)
MAGNESIUM SERPL-MCNC: 2.6 MG/DL — SIGNIFICANT CHANGE UP (ref 1.6–2.6)
MCHC RBC-ENTMCNC: 30 PG — SIGNIFICANT CHANGE UP (ref 27–34)
MCHC RBC-ENTMCNC: 32.7 GM/DL — SIGNIFICANT CHANGE UP (ref 32–36)
MCV RBC AUTO: 91.9 FL — SIGNIFICANT CHANGE UP (ref 80–100)
MONOCYTES # BLD AUTO: 0.55 K/UL — SIGNIFICANT CHANGE UP (ref 0–0.9)
MONOCYTES NFR BLD AUTO: 9.1 % — SIGNIFICANT CHANGE UP (ref 2–14)
NEUTROPHILS # BLD AUTO: 4.52 K/UL — SIGNIFICANT CHANGE UP (ref 1.8–7.4)
NEUTROPHILS NFR BLD AUTO: 75.1 % — SIGNIFICANT CHANGE UP (ref 43–77)
PHOSPHATE SERPL-MCNC: 3.6 MG/DL — SIGNIFICANT CHANGE UP (ref 2.5–4.5)
PLATELET # BLD AUTO: 178 K/UL — SIGNIFICANT CHANGE UP (ref 150–400)
POTASSIUM SERPL-MCNC: 4.4 MMOL/L — SIGNIFICANT CHANGE UP (ref 3.5–5.3)
POTASSIUM SERPL-SCNC: 4.4 MMOL/L — SIGNIFICANT CHANGE UP (ref 3.5–5.3)
PROTHROM AB SERPL-ACNC: 13.5 SEC — SIGNIFICANT CHANGE UP (ref 10.6–13.6)
RBC # BLD: 3.83 M/UL — SIGNIFICANT CHANGE UP (ref 3.8–5.2)
RBC # FLD: 13.2 % — SIGNIFICANT CHANGE UP (ref 10.3–14.5)
SARS-COV-2 IGG+IGM SERPL QL IA: 0.4 U/ML — SIGNIFICANT CHANGE UP
SARS-COV-2 IGG+IGM SERPL QL IA: NEGATIVE — SIGNIFICANT CHANGE UP
SODIUM SERPL-SCNC: 140 MMOL/L — SIGNIFICANT CHANGE UP (ref 135–145)
TSH SERPL-MCNC: 1.81 UU/ML — SIGNIFICANT CHANGE UP (ref 0.34–4.82)
WBC # BLD: 6.02 K/UL — SIGNIFICANT CHANGE UP (ref 3.8–10.5)
WBC # FLD AUTO: 6.02 K/UL — SIGNIFICANT CHANGE UP (ref 3.8–10.5)

## 2021-04-06 PROCEDURE — 99233 SBSQ HOSP IP/OBS HIGH 50: CPT

## 2021-04-06 PROCEDURE — 93306 TTE W/DOPPLER COMPLETE: CPT | Mod: 26

## 2021-04-06 PROCEDURE — 99223 1ST HOSP IP/OBS HIGH 75: CPT

## 2021-04-06 RX ORDER — DESVENLAFAXINE 50 MG/1
100 TABLET, EXTENDED RELEASE ORAL DAILY
Refills: 0 | Status: DISCONTINUED | OUTPATIENT
Start: 2021-04-06 | End: 2021-04-08

## 2021-04-06 RX ORDER — CEFAZOLIN SODIUM 1 G
2000 VIAL (EA) INJECTION ONCE
Refills: 0 | Status: DISCONTINUED | OUTPATIENT
Start: 2021-04-06 | End: 2021-04-06

## 2021-04-06 RX ADMIN — ATORVASTATIN CALCIUM 10 MILLIGRAM(S): 80 TABLET, FILM COATED ORAL at 21:13

## 2021-04-06 RX ADMIN — CEFTRIAXONE 1000 MILLIGRAM(S): 500 INJECTION, POWDER, FOR SOLUTION INTRAMUSCULAR; INTRAVENOUS at 21:18

## 2021-04-06 RX ADMIN — MIRTAZAPINE 45 MILLIGRAM(S): 45 TABLET, ORALLY DISINTEGRATING ORAL at 21:14

## 2021-04-06 RX ADMIN — DESVENLAFAXINE 100 MILLIGRAM(S): 50 TABLET, EXTENDED RELEASE ORAL at 11:37

## 2021-04-06 RX ADMIN — ARIPIPRAZOLE 2 MILLIGRAM(S): 15 TABLET ORAL at 10:46

## 2021-04-06 NOTE — PROGRESS NOTE ADULT - ASSESSMENT
A/P: 89 year old female with history of HTN on Metoprolol, PAF not on OAC, dementia, LBBB with hemodynamically stable complete heart block.      Plan:  Continue telemonitoring  PPM implant today  Keep NPO  Will obtain consent from health care proxy   A/P: 89 year old female with history of HTN on Metoprolol, PAF not on OAC, dementia, LBBB with hemodynamically stable complete heart block.      Plan:  Continue telemonitoring  PPM implant today  Keep NPO  Patient A&Ox2 today, will obtain consent from health care proxy

## 2021-04-06 NOTE — DIETITIAN INITIAL EVALUATION ADULT. - PERTINENT MEDS FT
MEDICATIONS  (STANDING):  ARIPiprazole 2 milliGRAM(s) Oral daily  atorvastatin 10 milliGRAM(s) Oral at bedtime  busPIRone 30 milliGRAM(s) Oral once  cefTRIAXone Injectable. 1000 milliGRAM(s) IV Push every 24 hours  desvenlafaxine  milliGRAM(s) Oral daily  hydrALAZINE 10 milliGRAM(s) Oral once  mirtazapine 45 milliGRAM(s) Oral at bedtime

## 2021-04-06 NOTE — DIETITIAN INITIAL EVALUATION ADULT. - OTHER INFO
89yo female with PMH significant for paroxysmal Afib, LBBB, MI, aortic stenosis, DVT, HLD, mild dementia p/w complete heart block, pt pending pace maker today.  Pt feeling SOB x 2 wks.

## 2021-04-06 NOTE — DIETITIAN NUTRITION RISK NOTIFICATION - ADDITIONAL COMMENTS/DIETITIAN RECOMMENDATIONS
1) advance diet as tolerated to regular with ensure enlive BID   2) add MVi with minerals daily to ensure 100% RDI met   3) consider adding thiamine 100mg daily as pt is high risk for thiamine deficiency   4) daily wt checks to track/trend changes

## 2021-04-06 NOTE — DIETITIAN INITIAL EVALUATION ADULT. - ORAL INTAKE PTA/DIET HISTORY
pt consumes 2-3 meals/day; 2 being small meals.  However, pt's daughter reporting pt eating very minimal (pt with mild dementia); pt meeting <50% of estimated nutr needs; no food allergies.  no vitamins.

## 2021-04-06 NOTE — DIETITIAN INITIAL EVALUATION ADULT. - PERTINENT LABORATORY DATA
04-06    140  |  111<H>  |  19  ----------------------------<  94  4.4   |  26  |  1.48<H>    Ca    8.6      06 Apr 2021 05:56  Phos  3.6     04-06  Mg     2.6     04-06    TPro  7.9  /  Alb  4.0  /  TBili  0.7  /  DBili  x   /  AST  20  /  ALT  23  /  AlkPhos  86  04-05

## 2021-04-06 NOTE — PROGRESS NOTE ADULT - SUBJECTIVE AND OBJECTIVE BOX
HPI:  89F w/ PMHx of paroxysmal Afib, LBBB, MI, aortic stenosis, DVT, HLD, mild dementia was sent in by cardiologist for complete heart block. Per pt, she has been feeling SOB x 2 weeks. SOB worsened by exertion. Denies chest pain, orthopnea, PND, lower extremity swelling, cough, fever, chills. Denies dizziness and lightheadedness. Due to symptoms, she went to get checked up by cardio and was found to have complete heart block and sent in. Pt reports at baseline, she walks around her neighborhood occasionally with her cane/walker. Has not been able to walk with her SOB. Per daughter, pt has not been eating or drinking much.     4/6/2021:  Seen at bedside, feeling well. Denies any CP, palpitations, SOB, dizziness, lightheadedness.  No events noted overnight  TELE: [facility]-40s    Vital Signs Last 24 Hrs  T(C): 36.7 (06 Apr 2021 01:00), Max: 36.8 (05 Apr 2021 16:44)  T(F): 98 (06 Apr 2021 01:00), Max: 98.3 (05 Apr 2021 16:44)  HR: 84 (06 Apr 2021 06:00) (37 - 91)  BP: 161/37 (06 Apr 2021 06:00) (143/71 - 196/46)  BP(mean): 70 (06 Apr 2021 06:00) (70 - 90)  RR: 22 (06 Apr 2021 06:00) (15 - 22)  SpO2: 97% (06 Apr 2021 06:00) (94% - 100%)    PHYSICAL EXAMINATION:  GENERAL APPEARANCE: NAD Pt. is alert, oriented, and pleasant.  HEENT:  Pupils are normal and react normally. No icterus. Mucous membranes well colored.  NECK:  Supple. No lymphadenopathy. Jugular venous pressure not elevated. Carotids equal.   HEART:  Bradycardic, S1, S2, +murmur  CHEST:  Chest is clear to auscultation. Normal respiratory effort.  ABDOMEN:  Soft and nontender.   EXTREMITIES:  There is no edema.   SKIN:  No rash or significant lesions are noted.      04-06    140  |  111<H>  |  19  ----------------------------<  94  4.4   |  26  |  1.48<H>    Ca    8.6      06 Apr 2021 05:56  Phos  3.6     04-06  Mg     2.6     04-06    TPro  7.9  /  Alb  4.0  /  TBili  0.7  /  DBili  x   /  AST  20  /  ALT  23  /  AlkPhos  86  04-05                              11.5   6.02  )-----------( 178      ( 06 Apr 2021 05:56 )             35.2     CARDIAC MARKERS ( 05 Apr 2021 19:36 )  0.019 ng/mL / x     / x     / x     / x      CARDIAC MARKERS ( 05 Apr 2021 16:58 )  0.020 ng/mL / x     / x     / x     / x        Radiology:  < from: Xray Chest 1 View- PORTABLE-Urgent (Xray Chest 1 View- PORTABLE-Urgent .) (04.05.21 @ 17:45) >  FINDINGS:    The lungs are clear of airspace consolidations or effusions. No pneumothorax.    The  heart is enlarged in transverse diameter. No hilar mass.   Visualized osseous structures are intact.         HPI:  89F w/ PMHx of paroxysmal Afib, LBBB, MI, aortic stenosis, DVT, HLD, mild dementia was sent in by cardiologist for complete heart block. Per pt, she has been feeling SOB x 2 weeks. SOB worsened by exertion. Denies chest pain, orthopnea, PND, lower extremity swelling, cough, fever, chills. Denies dizziness and lightheadedness. Due to symptoms, she went to get checked up by cardio and was found to have complete heart block and sent in. Pt reports at baseline, she walks around her neighborhood occasionally with her cane/walker. Has not been able to walk with her SOB. Per daughter, pt has not been eating or drinking much.     4/6/2021:  Seen at bedside, feeling well. Denies any CP, palpitations, SOB, dizziness, lightheadedness.  No events noted overnight.  Patient A&Ox2(person and place)  TELE: [facility] 30-40s    Vital Signs Last 24 Hrs  T(C): 36.7 (06 Apr 2021 01:00), Max: 36.8 (05 Apr 2021 16:44)  T(F): 98 (06 Apr 2021 01:00), Max: 98.3 (05 Apr 2021 16:44)  HR: 84 (06 Apr 2021 06:00) (37 - 91)  BP: 161/37 (06 Apr 2021 06:00) (143/71 - 196/46)  BP(mean): 70 (06 Apr 2021 06:00) (70 - 90)  RR: 22 (06 Apr 2021 06:00) (15 - 22)  SpO2: 97% (06 Apr 2021 06:00) (94% - 100%)    PHYSICAL EXAMINATION:  GENERAL APPEARANCE: NAD Pt. is alert, oriented, and pleasant.  HEENT:  Pupils are normal and react normally. No icterus. Mucous membranes well colored.  NECK:  Supple. No lymphadenopathy. Jugular venous pressure not elevated. Carotids equal.   HEART:  Bradycardic, S1, S2, +murmur  CHEST:  Chest is clear to auscultation. Normal respiratory effort.  ABDOMEN:  Soft and nontender.   EXTREMITIES:  There is no edema.   SKIN:  No rash or significant lesions are noted.      04-06    140  |  111<H>  |  19  ----------------------------<  94  4.4   |  26  |  1.48<H>    Ca    8.6      06 Apr 2021 05:56  Phos  3.6     04-06  Mg     2.6     04-06    TPro  7.9  /  Alb  4.0  /  TBili  0.7  /  DBili  x   /  AST  20  /  ALT  23  /  AlkPhos  86  04-05                              11.5   6.02  )-----------( 178      ( 06 Apr 2021 05:56 )             35.2     CARDIAC MARKERS ( 05 Apr 2021 19:36 )  0.019 ng/mL / x     / x     / x     / x      CARDIAC MARKERS ( 05 Apr 2021 16:58 )  0.020 ng/mL / x     / x     / x     / x        Radiology:  < from: Xray Chest 1 View- PORTABLE-Urgent (Xray Chest 1 View- PORTABLE-Urgent .) (04.05.21 @ 17:45) >  FINDINGS:    The lungs are clear of airspace consolidations or effusions. No pneumothorax.    The  heart is enlarged in transverse diameter. No hilar mass.   Visualized osseous structures are intact.

## 2021-04-06 NOTE — PROGRESS NOTE ADULT - ASSESSMENT
89F w/ PMHx of paroxysmal Afib, LBBB, MI, aortic stenosis, DVT, HTN, HLD, mild dementia was sent in by cardiologist for complete heart block.     #Complete heart block   - Continue on CICU   - trop negative  - per EP: discontinue AV mable blocking agents  -,pacer pads in place  - May give dopamine if symptomatic  - Pacemaker at 5 pm today   - NPO for proceduret   - mag, k, phos now all normal  - CXR - cardomegally    #PAF  - rate controlled  - not on AC  - HasBled risk 4-5.8%  - will continue to monitor off AC    #MUKESH on CKD3 - improving  - likely AIN vs infectious given urine WBC clumps  - given symptom of increased frequency, will treat for UTI   - ceftriaxone 1g q24hr  - BUN/Cr < 2   - d/c PPI  - f/u urine culture    #HTN  - chronic - controlled at home  - asymptomatic   - IV hydralazine for SBP > 180  - hold amlodipine and metoprolol for AV mable activity     #Depression  - continue mirtazapine  - do not carry desvenlafaxine, may bring from home if hospital stay prolonged  - continue buspirone    #DVT PPx  - IMPROVE Score 1  - bed rest for time being given bradycardia, will order SCDs    # Mild Pulmonary HTN    #LVH secondary to HN  - follow BP    #GOC  - full code for time being (see detailed discussion above)    #Dispo  - likely will be discharged to home when medically stable

## 2021-04-06 NOTE — CONSULT NOTE ADULT - PROBLEM SELECTOR RECOMMENDATION 3
worsening of severity of AS  probably contributing to her symptoms ,will reassess PPM placement , to assess class

## 2021-04-06 NOTE — PROGRESS NOTE ADULT - SUBJECTIVE AND OBJECTIVE BOX
SUBJECTIVE:    CHIEF COMPLAINT:  Patient is a 89y old  Female who presents with a chief complaint of complete heart block (2021 10:09)      HPI:  89F w/ PMHx of paroxysmal Afib, LBBB, MI, aortic stenosis, DVT, HLD, mild dementia was sent in by cardiologist for complete heart block. Per pt, she has been feeling SOB x 1 week. SOB worsened by exertion. Denies chest pain, orthopnea, PND, lower extremity swelling, cough, fever, chills. Denies dizziness and lightheadedness. Due to symptoms, she went to get checked up by cardiologist and was found to have complete heart block and sent in. Pt reports at baseline, she walks around her neighborhood occasionally with her cane/walker. Has not been able to walk with her SOB. Per daughter, pt has not been eating or drinking much.     Interval HPI and Overnight Events: Pt in CCU. Resting comfortably. External pacemaker pads in place. No SOB at rest.    REVIEW OF SYSTEMS:  CONSTITUTIONAL: No weakness, fevers or chills  EYES/ENT: No visual changes;  No vertigo or throat pain   NECK: No pain or stiffness  RESPIRATORY: No cough, wheezing, hemoptysis; No shortness of breath  CARDIOVASCULAR: No chest pain or palpitations  GASTROINTESTINAL: No abdominal or epigastric pain. No nausea, vomiting, or hematemesis; No diarrhea or constipation. No melena or hematochezia.  GENITOURINARY: No dysuria, frequency or hematuria  NEUROLOGICAL: No numbness or weakness  SKIN: No itching, burning, rashes, or lesions   All other review of systems is negative unless indicated above    OBJECTIVE    Vital Signs Last 24 Hrs  T(C): 36.3 (2021 16:00), Max: 36.8 (2021 16:44)  T(F): 97.4 (2021 16:00), Max: 98.3 (2021 16:44)  HR: 45 (2021 16:00) (37 - 93)  BP: 105/58 (2021 16:00) (105/58 - 196/46)  BP(mean): 70 (2021 15:00) (68 - 94)  RR: 18 (2021 16:00) (14 - 22)  SpO2: 99% (2021 16:00) (94% - 100%)    MEDICATIONS  (STANDING):  ARIPiprazole 2 milliGRAM(s) Oral daily  atorvastatin 10 milliGRAM(s) Oral at bedtime  busPIRone 30 milliGRAM(s) Oral once  ceFAZolin   IVPB 2000 milliGRAM(s) IV Intermittent once  cefTRIAXone Injectable. 1000 milliGRAM(s) IV Push every 24 hours  desvenlafaxine  milliGRAM(s) Oral daily  hydrALAZINE 10 milliGRAM(s) Oral once  mirtazapine 45 milliGRAM(s) Oral at bedtime      LABS:                         11.5   6.02  )-----------( 178      ( 2021 05:56 )             35.2     04-06    140  |  111<H>  |  19  ----------------------------<  94  4.4   |  26  |  1.48<H>    Ca    8.6      2021 05:56  Phos  3.6     04-06  Mg     2.6     04-06    TPro  7.9  /  Alb  4.0  /  TBili  0.7  /  DBili  x   /  AST  20  /  ALT  23  /  AlkPhos  86  04-05    Urinalysis Basic - ( 2021 18:37 )  Color: Yellow / Appearance: Clear / S.010 / pH: x  Gluc: x / Ketone: Trace  / Bili: Negative / Urobili: Negative mg/dL   Blood: x / Protein: 30 mg/dL / Nitrite: Negative   Leuk Esterase: Moderate / RBC: 6-10 /HPF / WBC 6-10   Sq Epi: x / Non Sq Epi: Occasional / Bacteria: Occasional      PT/INR - ( 2021 05:56 )   PT: 13.5 sec;   INR: 1.17 ratio    PTT - ( 2021 16:58 )  PTT:30.1 sec    CARDIAC MARKERS ( 2021 19:36 )  0.019 ng/mL / x     / x     / x     / x      CARDIAC MARKERS ( 2021 16:58 )  0.020 ng/mL / x     / x     / x     / x        THYROID PANEL   04- @ 05:56  TSH 1.81    RADIOLOGY:  Xray Chest 1 View- PORTABLE-Urgent (Xray Chest 1 View- PORTABLE-Urgent .) (21 @ 17:45) >    IMPRESSION: Cardiomegaly.  No evidence of active chest disease.    EK Lead ECG (21 @ 16:44) >    Diagnosis Line Sinus bradycardia with complete heart block and Junctional bradycardia  Left axis deviation  Left ventricular hypertrophy with QRS widening and repolarization abnormality  Possible Lateral infarct , age undetermined  Abnormal ECG  When compared with ECG of 2021 16:44,  Junctional rhythm has replaced Idioventricular rhythm    TTE Echo Complete w/o Contrast w/ Doppler (21 @ 09:32) >  There is severe calcification of mitral valve . The leaflet opening is   mildly restricted.   Mean transmitral gradient is 4 mmHg; this finding is consistent with mild   mitral stenosis.   Mild (1+) mitral regurgitation is present.   Significant fibrocalcific changes noted to the aortic valveleaflets with   restriction in leaflet excursion. Peak and mean transaortic gradients are   70 and 40 mmHg respectively; this finding is consistent with moderate to   severe aortic stenosis. The gradients may be overestimated due to a   hyperdynamic ventricle.   JENN by continuity equation is 1.2 cm2.   The tricuspid valve leaflets are thin and pliable; valve motion is normal.   Mild (1+) tricuspid valve regurgitation is present.   Mild pulmonary hypertension.   The left atrium is mildly dilated.   Mild concentric left ventricular hypertrophy is present.   Estimated left ventricular ejection fraction is >70 %.   Normal appearing right ventricle structure and function.

## 2021-04-06 NOTE — DIETITIAN INITIAL EVALUATION ADULT. - MALNUTRITION
severe malnutrition in chronic illness severe malnutrition in chronic illness r/t decreased PO intake 2/2 dementia/depression/heart disease AEB severe muscle/mod fat wasting; meeting <50% of estimated nutr needs

## 2021-04-06 NOTE — PATIENT PROFILE ADULT - ARE SIGNIFICANT INDICATORS COMPLETE.
Information Dr. Cruz wants you to know:    Your opinion matters! Thank you for choosing Dr.Michael Cruz at [facility].     In the next few weeks you may receive a Press Ganey survey regarding your most recent clinic visit with us.  Please take a few moments out of your day to accurately evaluate your visit.  We strive to provide you with the best medical care and hope you are happy with our services 100% of the time.  We consider any rating lower than a 9/10 unacceptable. If you believe you would rate our clinic less than this please let us know how we can improve.  Again, thank you for your time and we look forward to your next visit.       Clinic Policy:  Cancellation and No Show: If you must cancel a visit, please give 24 hours notice so we can accommodate other patients waiting to be seen. As a courtesy our automated system will place a reminder call to you 48 hours prior to your appointment time. Any appointment cancelled less than 2 hours prior to start time will be considered a “No Show”. Any patient arriving 10 minutes after their scheduled appointment may need to re-schedule their appointment and be considered a \"No Show\".      You can cancel your appointment by calling our office at [phone] during normal business hours of 7:30 am to 5:00 pm or online via your [employer] account.      Forms (FMLA/ Disability):  Please complete your portion of any forms prior to bringing them into the office. This includes adding a telephone number where you can be reached during normal business hours. Please allow 7-14 days for any form to be completed. Some FMLA forms will need appointment to be completed.     Medication Requests:  Please plan ahead. We need up to 2 business days notice for refills to be completed. Please contact your preferred pharmacy for your refills. The pharmacy will contact the office for the refills.     Messages:  Messages left for Dr.Michael Cruz will be returned  within 24 business hours or sooner.     Test results:  Our goal is to report all test results ordered by Dr. Hilario Cruz within 7-14 days. If you do not receive your test results either by phone or [facility] message within 14 days after your visit with our office, please call our office at [phone] and ask to speak with a member of our care team or send your care team a message via your [employer] account.     [employer] Registration:  If you are not registered for a [employer] account, please ask your  to send you the link via e-mail or you can call [phone] and receive registration information.     Office Hours for Dr. Cruz:  Monday: 9am- 6pm  Tuesday-Thursday: 8am-5pm  Friday: 7am-11am    Your Opinion Matters To Us  If you receive a patient satisfaction survey in the mail, please complete and return it in the postage-paid envelope.    We truly value and appreciate your feedback.           Yes

## 2021-04-07 PROCEDURE — 33208 INSRT HEART PM ATRIAL & VENT: CPT | Mod: KX

## 2021-04-07 PROCEDURE — 93010 ELECTROCARDIOGRAM REPORT: CPT

## 2021-04-07 PROCEDURE — 71045 X-RAY EXAM CHEST 1 VIEW: CPT | Mod: 26

## 2021-04-07 PROCEDURE — 99232 SBSQ HOSP IP/OBS MODERATE 35: CPT

## 2021-04-07 RX ORDER — CEFAZOLIN SODIUM 1 G
2000 VIAL (EA) INJECTION ONCE
Refills: 0 | Status: COMPLETED | OUTPATIENT
Start: 2021-04-07 | End: 2021-04-07

## 2021-04-07 RX ADMIN — ARIPIPRAZOLE 2 MILLIGRAM(S): 15 TABLET ORAL at 08:21

## 2021-04-07 RX ADMIN — ATORVASTATIN CALCIUM 10 MILLIGRAM(S): 80 TABLET, FILM COATED ORAL at 21:43

## 2021-04-07 RX ADMIN — DESVENLAFAXINE 100 MILLIGRAM(S): 50 TABLET, EXTENDED RELEASE ORAL at 08:21

## 2021-04-07 RX ADMIN — CEFTRIAXONE 1000 MILLIGRAM(S): 500 INJECTION, POWDER, FOR SOLUTION INTRAMUSCULAR; INTRAVENOUS at 21:43

## 2021-04-07 RX ADMIN — Medication 100 MILLIGRAM(S): at 13:26

## 2021-04-07 RX ADMIN — MIRTAZAPINE 45 MILLIGRAM(S): 45 TABLET, ORALLY DISINTEGRATING ORAL at 21:43

## 2021-04-07 RX ADMIN — Medication 30 MILLIGRAM(S): at 08:20

## 2021-04-07 NOTE — CHART NOTE - NSCHARTNOTEFT_GEN_A_CORE
Micra AV pacemaker implant aborted due to tortuosity of vessels, will proceed with traditional pacemaker. Micra AV pacemaker implant aborted due to tortuosity of vessels, will proceed with traditional pacemaker.  Right groin access for Micra, sheath removed, suture in place  Left groin access for TVP removed post procedure, manual pressure applied, hemostasis achieved.

## 2021-04-07 NOTE — PROCEDURE NOTE - ADDITIONAL PROCEDURE DETAILS
Antibiotics complete  CXR  EKG  Pressure dressing Attempted Micra PPM implant aborted due to tortuous vessels.  Right groin access obtained for Micra procedure, sheath removed and suture placed.  Left groin access for TVP placed, removed at end of procedure, manual pressure held and hemostasis achieved  Patient was hemodynamically stable throughout    Post Procedure:  Antibiotics complete  EKG  CXR  Pressure dressing to right chest wall incision

## 2021-04-07 NOTE — PROGRESS NOTE ADULT - ASSESSMENT
89F w/ PMHx of paroxysmal Afib, LBBB, MI, aortic stenosis, DVT, HTN, HLD, mild dementia was sent in by cardiologist for complete heart block.     #Complete heart block  - in sinus at this moment with HR in 50's  - Continue on CICU   - trop negative  - per EP: discontinue AV mable blocking agents  -,pacer pads in place  - May give dopamine if symptomatic  - Pacemaker today   - NPO for proceduret   - mag, k, phos now all normal  - CXR - cardomegally    #PAF  - rate controlled  - not on AC  - HasBled risk 4-5.8%  - will continue to monitor off AC    #MUKESH on CKD3 - improving  - likely AIN vs infectious given urine WBC clumps  - given symptom of increased frequency, will treat for UTI   - ceftriaxone 1g q24hr  - BUN/Cr < 2   - d/c PPI  - urine culture was not done. 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

## 2021-04-07 NOTE — PROGRESS NOTE ADULT - NUTRITIONAL ASSESSMENT
This patient has been assessed with a concern for Malnutrition and has been determined to have a diagnosis/diagnoses of Severe protein-calorie malnutrition.    This patient is being managed with:   Diet NPO-  Except Medications     Special Instructions for Nursing:  Except Medications  Entered: Apr 6 2021  2:03PM    
This patient has been assessed with a concern for Malnutrition and has been determined to have a diagnosis/diagnoses of Severe protein-calorie malnutrition.    This patient is being managed with:   Diet NPO after Midnight-     NPO Start Date: 06-Apr-2021   NPO Start Time: 23:59  Entered: Apr 6 2021  4:48PM    Diet DASH/TLC-  Sodium & Cholesterol Restricted  Entered: Apr 6 2021  4:47PM

## 2021-04-07 NOTE — PROGRESS NOTE ADULT - SUBJECTIVE AND OBJECTIVE BOX
SUBJECTIVE:    CHIEF COMPLAINT:  Patient is a 89y old  Female who presents with a chief complaint of complete heart block (2021 16:18)      HPI:  89F w/ PMHx of paroxysmal Afib, LBBB, MI, aortic stenosis, DVT, HLD, mild dementia was sent in by cardiologist for complete heart block. Per pt, she has been feeling SOB x 1 week. SOB worsened by exertion. Denies chest pain, orthopnea, PND, lower extremity swelling, cough, fever, chills. Denies dizziness and lightheadedness. Due to symptoms, she went to get checked up by cardiologist and was found to have complete heart block and sent in. Pt reports at baseline, she walks around her neighborhood occasionally with her cane/walker. Has not been able to walk with her SOB. Per daughter, pt has not been eating or drinking much.     Interval HPI and Overnight Events: Pacemaker not done last  night due to case load. On schedule for today    REVIEW OF SYSTEMS:  CONSTITUTIONAL: No weakness, fevers or chills  EYES/ENT: No visual changes;  No vertigo or throat pain   NECK: No pain or stiffness  RESPIRATORY: No cough, wheezing, hemoptysis; No shortness of breath  CARDIOVASCULAR: No chest pain or palpitations  GASTROINTESTINAL: No abdominal or epigastric pain. No nausea, vomiting, or hematemesis; No diarrhea or constipation. No melena or hematochezia.  GENITOURINARY: No dysuria, frequency or hematuria  NEUROLOGICAL: No numbness or weakness  SKIN: No itching, burning, rashes, or lesions   All other review of systems is negative unless indicated above    OBJECTIVE    Vital Signs Last 24 Hrs  T(C): 36.1 (2021 06:34), Max: 36.7 (2021 00:31)  T(F): 96.9 (2021 06:34), Max: 98 (2021 00:31)  HR: 66 (2021 09:00) (41 - 93)  BP: 187/41 (2021 07:00) (105/58 - 187/41)  BP(mean): 78 (2021 07:00) (60 - 95)  RR: 17 (2021 09:00) (15 - 24)  SpO2: 94% (2021 09:00) (90% - 100%)    MEDICATIONS  (STANDING):  ARIPiprazole 2 milliGRAM(s) Oral daily  atorvastatin 10 milliGRAM(s) Oral at bedtime  cefTRIAXone Injectable. 1000 milliGRAM(s) IV Push every 24 hours  desvenlafaxine  milliGRAM(s) Oral daily  hydrALAZINE 10 milliGRAM(s) Oral once  mirtazapine 45 milliGRAM(s) Oral at bedtime      LABS:                         11.5   6.02  )-----------( 178      ( 2021 05:56 )             35.2     04-06    140  |  111<H>  |  19  ----------------------------<  94  4.4   |  26  |  1.48<H>    Ca    8.6      2021 05:56  Phos  3.6     04-06  Mg     2.6     04-06    TPro  7.9  /  Alb  4.0  /  TBili  0.7  /  DBili  x   /  AST  20  /  ALT  23  /  AlkPhos  86  04-05    Urinalysis Basic - ( 2021 18:37 )    Color: Yellow / Appearance: Clear / S.010 / pH: x  Gluc: x / Ketone: Trace  / Bili: Negative / Urobili: Negative mg/dL   Blood: x / Protein: 30 mg/dL / Nitrite: Negative   Leuk Esterase: Moderate / RBC: 6-10 /HPF / WBC 6-10   Sq Epi: x / Non Sq Epi: Occasional / Bacteria: Occasional    PT/INR - ( 2021 05:56 )   PT: 13.5 sec;   INR: 1.17 ratio         PTT - ( 2021 16:58 )  PTT:30.1 sec  CARDIAC MARKERS ( 2021 19:36 )  0.019 ng/mL / x     / x     / x     / x      CARDIAC MARKERS ( 2021 16:58 )  0.020 ng/mL / x     / x     / x     / x

## 2021-04-07 NOTE — PACU DISCHARGE NOTE - COMMENTS
Report given to Danay [facility] CCU; pt placed on Z[employer] monitor for transport. Pt transported via stretcher to CCU by transporter, accompanied by RN.

## 2021-04-08 ENCOUNTER — TRANSCRIPTION ENCOUNTER (OUTPATIENT)
Age: 86
End: 2021-04-08

## 2021-04-08 ENCOUNTER — NON-APPOINTMENT (OUTPATIENT)
Age: 86
End: 2021-04-08

## 2021-04-08 VITALS — RESPIRATION RATE: 22 BRPM | OXYGEN SATURATION: 96 % | HEART RATE: 70 BPM

## 2021-04-08 LAB
ANION GAP SERPL CALC-SCNC: 7 MMOL/L — SIGNIFICANT CHANGE UP (ref 5–17)
BASOPHILS # BLD AUTO: 0.04 K/UL — SIGNIFICANT CHANGE UP (ref 0–0.2)
BASOPHILS NFR BLD AUTO: 0.6 % — SIGNIFICANT CHANGE UP (ref 0–2)
BUN SERPL-MCNC: 24 MG/DL — HIGH (ref 7–23)
CALCIUM SERPL-MCNC: 8.4 MG/DL — LOW (ref 8.5–10.1)
CHLORIDE SERPL-SCNC: 111 MMOL/L — HIGH (ref 96–108)
CO2 SERPL-SCNC: 24 MMOL/L — SIGNIFICANT CHANGE UP (ref 22–31)
CREAT SERPL-MCNC: 1.44 MG/DL — HIGH (ref 0.5–1.3)
EOSINOPHIL # BLD AUTO: 0.1 K/UL — SIGNIFICANT CHANGE UP (ref 0–0.5)
EOSINOPHIL NFR BLD AUTO: 1.6 % — SIGNIFICANT CHANGE UP (ref 0–6)
GLUCOSE SERPL-MCNC: 84 MG/DL — SIGNIFICANT CHANGE UP (ref 70–99)
HCT VFR BLD CALC: 35.2 % — SIGNIFICANT CHANGE UP (ref 34.5–45)
HGB BLD-MCNC: 11.2 G/DL — LOW (ref 11.5–15.5)
IMM GRANULOCYTES NFR BLD AUTO: 0.3 % — SIGNIFICANT CHANGE UP (ref 0–1.5)
LYMPHOCYTES # BLD AUTO: 0.78 K/UL — LOW (ref 1–3.3)
LYMPHOCYTES # BLD AUTO: 12.2 % — LOW (ref 13–44)
MCHC RBC-ENTMCNC: 29.9 PG — SIGNIFICANT CHANGE UP (ref 27–34)
MCHC RBC-ENTMCNC: 31.8 GM/DL — LOW (ref 32–36)
MCV RBC AUTO: 94.1 FL — SIGNIFICANT CHANGE UP (ref 80–100)
MONOCYTES # BLD AUTO: 0.71 K/UL — SIGNIFICANT CHANGE UP (ref 0–0.9)
MONOCYTES NFR BLD AUTO: 11.1 % — SIGNIFICANT CHANGE UP (ref 2–14)
NEUTROPHILS # BLD AUTO: 4.73 K/UL — SIGNIFICANT CHANGE UP (ref 1.8–7.4)
NEUTROPHILS NFR BLD AUTO: 74.2 % — SIGNIFICANT CHANGE UP (ref 43–77)
PLATELET # BLD AUTO: 149 K/UL — LOW (ref 150–400)
POTASSIUM SERPL-MCNC: 4.1 MMOL/L — SIGNIFICANT CHANGE UP (ref 3.5–5.3)
POTASSIUM SERPL-SCNC: 4.1 MMOL/L — SIGNIFICANT CHANGE UP (ref 3.5–5.3)
RBC # BLD: 3.74 M/UL — LOW (ref 3.8–5.2)
RBC # FLD: 13.2 % — SIGNIFICANT CHANGE UP (ref 10.3–14.5)
SODIUM SERPL-SCNC: 142 MMOL/L — SIGNIFICANT CHANGE UP (ref 135–145)
WBC # BLD: 6.38 K/UL — SIGNIFICANT CHANGE UP (ref 3.8–10.5)
WBC # FLD AUTO: 6.38 K/UL — SIGNIFICANT CHANGE UP (ref 3.8–10.5)

## 2021-04-08 PROCEDURE — 93010 ELECTROCARDIOGRAM REPORT: CPT

## 2021-04-08 PROCEDURE — 99233 SBSQ HOSP IP/OBS HIGH 50: CPT

## 2021-04-08 PROCEDURE — 99239 HOSP IP/OBS DSCHRG MGMT >30: CPT

## 2021-04-08 RX ADMIN — ARIPIPRAZOLE 2 MILLIGRAM(S): 15 TABLET ORAL at 10:03

## 2021-04-08 RX ADMIN — DESVENLAFAXINE 100 MILLIGRAM(S): 50 TABLET, EXTENDED RELEASE ORAL at 10:03

## 2021-04-08 NOTE — PHYSICAL THERAPY INITIAL EVALUATION ADULT - PLANNED THERAPY INTERVENTIONS, PT EVAL
patient education R shoulder precautions s/p PPM implantation 4/7/21/balance training/bed mobility training/gait training/postural re-education/strengthening/transfer training

## 2021-04-08 NOTE — PROGRESS NOTE ADULT - PROBLEM SELECTOR PLAN 2
Outpatient evaluation for TAVR; patient should f/u with Dr Palla within 1-2 weeks of discharge for initiation of further discussions regarding management.

## 2021-04-08 NOTE — DISCHARGE NOTE PROVIDER - CARE PROVIDER_API CALL
Luis Felipe Palmer)  Family Medicine  [address], [address] [address]  Phone: [phone]  Fax: [phone]  Follow Up Time:     Palla, Venugopal R (MD)  Cardiovascular Disease; Internal Medicine  [address], [place] [id number]  Phone: [phone]  Fax: [phone]  Follow Up Time:

## 2021-04-08 NOTE — DISCHARGE NOTE NURSING/CASE MANAGEMENT/SOCIAL WORK - PATIENT PORTAL LINK FT
You can access the FollowMyHealth Patient Portal offered by [facility] by registering at the following website: [email or web address]. By joining [employer]’s FollowMyHealth portal, you will also be able to view your health information using other applications (apps) compatible with our system.
show

## 2021-04-08 NOTE — PHYSICAL THERAPY INITIAL EVALUATION ADULT - RANGE OF MOTION EXAMINATION, REHAB EVAL
TRACIEE shoulder AROM tested below 90 degrees s/p R PPM implant/shoulder precautions ,she is CHELSI Millard

## 2021-04-08 NOTE — DISCHARGE NOTE PROVIDER - CARE PROVIDERS DIRECT ADDRESSES
,[email or web address]

## 2021-04-08 NOTE — PHYSICAL THERAPY INITIAL EVALUATION ADULT - LEVEL OF INDEPENDENCE: SUPINE/SIT, REHAB EVAL
Add 94813 Cpt? (Important Note: In 2017 The Use Of 79808 Is Being Tracked By Cms To Determine Future Global Period Reimbursement For Global Periods): no
Detail Level: Detailed
Body Location Override (Optional - Billing Will Still Be Based On Selected Body Map Location If Applicable): L distal posterior arm
contact guard/minimum assist (75% patients effort)

## 2021-04-08 NOTE — PHYSICAL THERAPY INITIAL EVALUATION ADULT - PRECAUTIONS/LIMITATIONS, REHAB EVAL
+PPM R CW implanted 4/7/21  R shoulder precautions until incision fully healed (no overhead lifting,no heavy pulling/pushing/lifting RUE)

## 2021-04-08 NOTE — PROGRESS NOTE ADULT - REASON FOR ADMISSION
complete heart block

## 2021-04-08 NOTE — PROGRESS NOTE ADULT - SUBJECTIVE AND OBJECTIVE BOX
REASON FOR VISIT: AV Block    HPI:  89 year old woman with a history of paroxysmal AF, LBBB, MI, aortic stenosis, DVT, HLD, aortic stenosis, mild dementia admitted on 21 with complete AV block (symptomatic); now s/p PPM implant on .    21:  Feels "better."  No new complaints.    MEDICATIONS  (STANDING):  ARIPiprazole 2 milliGRAM(s) Oral daily  atorvastatin 10 milliGRAM(s) Oral at bedtime  cefTRIAXone Injectable. 1000 milliGRAM(s) IV Push every 24 hours  desvenlafaxine  milliGRAM(s) Oral daily  hydrALAZINE 10 milliGRAM(s) Oral once  mirtazapine 45 milliGRAM(s) Oral at bedtime    Home Medications:  Abilify 2 mg oral tablet: 1 tab(s) orally once a day (2021 21:27)  alendronate 70 mg oral tablet: 1 tab(s) orally once a week (2021 21:27)  amLODIPine 5 mg oral tablet: 1 tab(s) orally once a day (2021 21:27)  atorvastatin 10 mg oral tablet: 1 tab(s) orally once a day (at bedtime) (2021 21:27)  busPIRone 30 mg oral tablet: 1 tab(s) orally once a day (in the evening) (2021 21:27)  Metoprolol Tartrate 25 mg oral tablet: 0.5 tab(s) orally once a day (in the evening) (2021 21:27)  mirtazapine 45 mg oral tablet: 1 tab(s) orally once a day (at bedtime) (2021 21:27)  pantoprazole 20 mg oral delayed release tablet: 1 tab(s) orally once a day (2021 21:27)  Pristiq 100 mg oral tablet, extended release: 1 tab(s) orally once a day (2021 21:27)    Vital Signs Last 24 Hrs  T(C): 36.6 (2021 06:00), Max: 37.1 (2021 00:00)  T(F): 97.8 (2021 06:00), Max: 98.8 (2021 00:00)  HR: 61 (2021 08:00) (45 - 78)  BP: 148/50 (2021 08:00) (120/58 - 174/85)  BP(mean): 76 (2021 08:00) (69 - 109)  RR: 22 (2021 08:00) (16 - 26)  SpO2: 100% (2021 08:00) (90% - 100%)    PHYSICAL EXAM:  Constitutional: NAD, awake and alert, appears frail  Respiratory: Breath sounds are clear bilaterally, No wheezing, rales or rhonchi  Cardiovascular: S1 and S2, regular rate and rhythm, systolic murmur; R-sided PPM implant  Gastrointestinal: Bowel Sounds present, soft, nontender.   Extremities: No edema.    LABS:              11.2   6.38  )-----------( 149      ( 2021 06:37 )             35.2     142  |  111<H>  |  24<H>  ----------------------------<  84  4.1   |  24  |  1.44<H>    Cardiology Summary  Carotid US 2015: Mild disease bilaterally   EK21, sinus rhythm with complete heart block existing basleine LBBB rate 39 BPM   Echo: 20, Moderate AI, severe AS (63p, 37m, and JENN 0.8 cm2). mild to moderate MS. LVH with minimal anteroseptal hypokinesis., normal LV function, no pulmonary hypertension, enlarged LA size, mild mitral regurgitation LVEF 54%.   Cardiac Cath: 2014, Nonobstructive coronary disease with normal LV function     Tele:  SR with electronic ventricular pacing    TTE Echo Complete w/o Contrast w/ Doppler (21 @ 09:32):   Mild mitral stenosis.  Mild mitral regurgitation.   Moderate to severe aortic stenosis.  JENN by continuity equation is 1.2 cm2.   Mild tricuspid valve regurgitation.   Mild pulmonary hypertension.   The left atrium is mildly dilated.   Mild concentric left ventricular hypertrophy is present.   Estimated left ventricular ejection fraction is >70 %.   Normal appearing right ventricle structure and function.

## 2021-04-08 NOTE — PROGRESS NOTE ADULT - SUBJECTIVE AND OBJECTIVE BOX
HPI:  89F w/ PMHx of paroxysmal Afib, LBBB, MI, aortic stenosis, DVT, HLD, mild dementia was sent in by cardiologist for complete heart block. Per pt, she has been feeling SOB x 2 weeks. SOB worsened by exertion. Denies chest pain, orthopnea, PND, lower extremity swelling, cough, fever, chills. Denies dizziness and lightheadedness. Due to symptoms, she went to get checked up by cardio and was found to have complete heart block and sent in. Pt reports at baseline, she walks around her neighborhood occasionally with her cane/walker. Has not been able to walk with her SOB. Per daughter, pt has not been eating or drinking much.     4/6/2021:  Seen at bedside, feeling well. Denies any CP, palpitations, SOB, dizziness, lightheadedness.  No events noted overnight.  Patient A&Ox2(person and place)  TELE: CHB 30-40s    4/8/21: s/p single chamber, right sided PPM implant, POD #1.  Micra AV was aborted due to tortuosity of vessels, temporary wire was placed and removed.  TELE: v-pacing with underlying complete heart block 60-70 bpm      MEDICATIONS  (STANDING):  ARIPiprazole 2 milliGRAM(s) Oral daily  atorvastatin 10 milliGRAM(s) Oral at bedtime  cefTRIAXone Injectable. 1000 milliGRAM(s) IV Push every 24 hours  desvenlafaxine  milliGRAM(s) Oral daily  hydrALAZINE 10 milliGRAM(s) Oral once  mirtazapine 45 milliGRAM(s) Oral at bedtime    MEDICATIONS  (PRN):     Allergies    No Known Allergies    Intolerances    codeine (Other)      REVIEW OF SYSTEMS:    CONSTITUTIONAL: No weakness, fevers or chills  EYES/ENT: No visual changes;  No vertigo or throat pain   NECK: No pain or stiffness  RESPIRATORY: No cough, wheezing, hemoptysis; No shortness of breath  CARDIOVASCULAR: No chest pain or palpitations  GASTROINTESTINAL: No abdominal or epigastric pain. No nausea, vomiting, or hematemesis; No diarrhea or constipation. No melena or hematochezia.  GENITOURINARY: No dysuria, frequency or hematuria  NEUROLOGICAL: No numbness or weakness  SKIN: No itching, burning, rashes, or lesions   All other review of systems is negative unless indicated above    Vital Signs Last 24 Hrs  T(C): 36.6 (08 Apr 2021 06:00), Max: 37.1 (08 Apr 2021 00:00)  T(F): 97.8 (08 Apr 2021 06:00), Max: 98.8 (08 Apr 2021 00:00)  HR: 61 (08 Apr 2021 08:00) (45 - 78)  BP: 148/50 (08 Apr 2021 08:00) (120/58 - 174/85)  BP(mean): 76 (08 Apr 2021 08:00) (69 - 109)  RR: 22 (08 Apr 2021 08:00) (16 - 26)  SpO2: 100% (08 Apr 2021 08:00) (90% - 100%)                          11.2   6.38  )-----------( 149      ( 08 Apr 2021 06:37 )             35.2   04-08    142  |  111<H>  |  24<H>  ----------------------------<  84  4.1   |  24  |  1.44<H>    Ca    8.4<L>      08 Apr 2021 06:37      PHYSICAL EXAM:    General: Frail elderly female in NAD  Neurology: A&Ox3, nonfocal, CHOU x 4  HEENT: NC/AT, neck supple, no JVD, EOMI, PERRL  Respiratory: CTA B/L  CV: RRR, S1S2, + systolic murmur III/VI, no rubs or gallops  Abdominal: Soft, NT, ND +BS  Extremities: No edema, + peripheral pulses.  Right groin stitch removed, site is soft, no bleeding or hematoma noted.  Skin: No rashes    < from: Xray Chest 1 View- PORTABLE-Urgent (04.07.21 @ 16:16) >  EXAM:  XR CHEST PORTABLE URGENT 1V                          PROCEDURE DATE:  04/07/2021      INTERPRETATION:  AP chest on April 7, 2021 at 3:57 PM. Patient had pacemaker implantation.    Heart enlargement and prominent aorta again noted. Lungs remain clear.    Above findings similar to April 5.    Present film shows a single wire right-sided pacemaker.    IMPRESSION: Pacemaker placement. No acute chest finding.        < from: TTE Echo Complete w/o Contrast w/ Doppler (04.06.21 @ 09:32) >   Impression     Summary     There is severe calcification of mitral valve . The leaflet opening is   mildly restricted.   Mean transmitral gradient is 4 mmHg; this finding is consistent with mild   mitral stenosis.   Mild (1+) mitral regurgitation is present.   Significant fibrocalcific changes noted to the aortic valveleaflets with   restriction in leaflet excursion. Peak and mean transaortic gradients are   70 and 40 mmHg respectively; this finding is consistent with moderate to   severe aortic stenosis. The gradients may be overestimated due to a   hyperdynamic ventricle.   JENN by continuity equation is 1.2 cm2.   The tricuspid valve leaflets are thin and pliable; valve motion is normal.   Mild (1+) tricuspid valve regurgitation is present.   Mild pulmonary hypertension.   The left atrium is mildly dilated.   Mild concentric left ventricular hypertrophy is present.   Estimated left ventricular ejection fraction is >70 %.   Normal appearing right ventricle structure and function.         HPI:  89F w/ PMHx of paroxysmal Afib, LBBB, MI, aortic stenosis, DVT, HLD, mild dementia was sent in by cardiologist for complete heart block. Per pt, she has been feeling SOB x 2 weeks. SOB worsened by exertion. Denies chest pain, orthopnea, PND, lower extremity swelling, cough, fever, chills. Denies dizziness and lightheadedness. Due to symptoms, she went to get checked up by cardio and was found to have complete heart block and sent in. Pt reports at baseline, she walks around her neighborhood occasionally with her cane/walker. Has not been able to walk with her SOB. Per daughter, pt has not been eating or drinking much.     4/6/2021:  Seen at bedside, feeling well. Denies any CP, palpitations, SOB, dizziness, lightheadedness.  No events noted overnight.  Patient A&Ox2(person and place)  TELE: CHB 30-40s    4/8/21: s/p single chamber, right sided PPM implant, POD #1.  Micra AV was aborted due to tortuosity of vessels, temporary wire was placed and removed.  TELE: v-pacing with underlying complete heart block 60-70 bpm  EKG: v-pacing 61 bpm      MEDICATIONS  (STANDING):  ARIPiprazole 2 milliGRAM(s) Oral daily  atorvastatin 10 milliGRAM(s) Oral at bedtime  cefTRIAXone Injectable. 1000 milliGRAM(s) IV Push every 24 hours  desvenlafaxine  milliGRAM(s) Oral daily  hydrALAZINE 10 milliGRAM(s) Oral once  mirtazapine 45 milliGRAM(s) Oral at bedtime    MEDICATIONS  (PRN):     Allergies    No Known Allergies    Intolerances    codeine (Other)      REVIEW OF SYSTEMS:    CONSTITUTIONAL: No weakness, fevers or chills  EYES/ENT: No visual changes;  No vertigo or throat pain   NECK: No pain or stiffness  RESPIRATORY: No cough, wheezing, hemoptysis; No shortness of breath  CARDIOVASCULAR: No chest pain or palpitations  GASTROINTESTINAL: No abdominal or epigastric pain. No nausea, vomiting, or hematemesis; No diarrhea or constipation. No melena or hematochezia.  GENITOURINARY: No dysuria, frequency or hematuria  NEUROLOGICAL: No numbness or weakness  SKIN: No itching, burning, rashes, or lesions   All other review of systems is negative unless indicated above    Vital Signs Last 24 Hrs  T(C): 36.6 (08 Apr 2021 06:00), Max: 37.1 (08 Apr 2021 00:00)  T(F): 97.8 (08 Apr 2021 06:00), Max: 98.8 (08 Apr 2021 00:00)  HR: 61 (08 Apr 2021 08:00) (45 - 78)  BP: 148/50 (08 Apr 2021 08:00) (120/58 - 174/85)  BP(mean): 76 (08 Apr 2021 08:00) (69 - 109)  RR: 22 (08 Apr 2021 08:00) (16 - 26)  SpO2: 100% (08 Apr 2021 08:00) (90% - 100%)                          11.2   6.38  )-----------( 149      ( 08 Apr 2021 06:37 )             35.2   04-08    142  |  111<H>  |  24<H>  ----------------------------<  84  4.1   |  24  |  1.44<H>    Ca    8.4<L>      08 Apr 2021 06:37      PHYSICAL EXAM:    General: Frail elderly female in NAD  Neurology: A&Ox3, nonfocal, CHOU x 4  HEENT: NC/AT, neck supple, no JVD, EOMI, PERRL  Respiratory: CTA B/L  CV: RRR, S1S2, + systolic murmur III/VI, no rubs or gallops  Abdominal: Soft, NT, ND +BS  Extremities: No edema, + peripheral pulses.  Right groin stitch removed, site is soft, no bleeding or hematoma noted.  Skin: No rashes    < from: Xray Chest 1 View- PORTABLE-Urgent (04.07.21 @ 16:16) >  EXAM:  XR CHEST PORTABLE URGENT 1V                          PROCEDURE DATE:  04/07/2021      INTERPRETATION:  AP chest on April 7, 2021 at 3:57 PM. Patient had pacemaker implantation.    Heart enlargement and prominent aorta again noted. Lungs remain clear.    Above findings similar to April 5.    Present film shows a single wire right-sided pacemaker.    IMPRESSION: Pacemaker placement. No acute chest finding.        < from: TTE Echo Complete w/o Contrast w/ Doppler (04.06.21 @ 09:32) >   Impression     Summary     There is severe calcification of mitral valve . The leaflet opening is   mildly restricted.   Mean transmitral gradient is 4 mmHg; this finding is consistent with mild   mitral stenosis.   Mild (1+) mitral regurgitation is present.   Significant fibrocalcific changes noted to the aortic valveleaflets with   restriction in leaflet excursion. Peak and mean transaortic gradients are   70 and 40 mmHg respectively; this finding is consistent with moderate to   severe aortic stenosis. The gradients may be overestimated due to a   hyperdynamic ventricle.   JENN by continuity equation is 1.2 cm2.   The tricuspid valve leaflets are thin and pliable; valve motion is normal.   Mild (1+) tricuspid valve regurgitation is present.   Mild pulmonary hypertension.   The left atrium is mildly dilated.   Mild concentric left ventricular hypertrophy is present.   Estimated left ventricular ejection fraction is >70 %.   Normal appearing right ventricle structure and function.

## 2021-04-08 NOTE — DISCHARGE NOTE PROVIDER - NSDCPNSUBOBJ_GEN_ALL_CORE
SUBJECTIVE:    CHIEF COMPLAINT:  Patient is a 89y old  Female who presents with a chief complaint of complete heart block (08 Apr 2021 09:43)      HPI:  89F w/ PMHx of paroxysmal Afib, LBBB, MI, aortic stenosis, DVT, HLD, mild dementia was sent in by cardiologist for complete heart block. Per pt, she has been feeling SOB x 1 weeks. SOB worsened by exertion. Denies chest pain, orthopnea, PND, lower extremity swelling, cough, fever, chills. Denies dizziness and lightheadedness. Due to symptoms, she went to get checked up by cardio and was found to have complete heart block and sent in. Pt reports at baseline, she walks around her neighborhood occasionally with her cane/walker. Has not been able to walk with her SOB. Per daughter, pt has not been eating or drinking much.           Interval HPI and Overnight Events:  tolerated PPM placement    REVIEW OF SYSTEMS:  CONSTITUTIONAL: No weakness, fevers or chills  EYES/ENT: No visual changes;  No vertigo or throat pain   NECK: No pain or stiffness  RESPIRATORY: No cough, wheezing, hemoptysis; No shortness of breath  CARDIOVASCULAR: No chest pain or palpitations  GASTROINTESTINAL: No abdominal or epigastric pain. No nausea, vomiting, or hematemesis; No diarrhea or constipation. No melena or hematochezia.  GENITOURINARY: No dysuria, frequency or hematuria  NEUROLOGICAL: No numbness or weakness  SKIN: No itching, burning, rashes, or lesions   All other review of systems is negative unless indicated above    OBJECTIVE    PHYSICAL EXAM:  Vital Signs Last 24 Hrs  T(C): 36.6 (08 Apr 2021 06:00), Max: 37.1 (08 Apr 2021 00:00)  T(F): 97.8 (08 Apr 2021 06:00), Max: 98.8 (08 Apr 2021 00:00)  HR: 70 (08 Apr 2021 12:00) (59 - 89)  BP: 129/54 (08 Apr 2021 11:00) (120/58 - 174/85)  BP(mean): 71 (08 Apr 2021 11:00) (69 - 109)  RR: 22 (08 Apr 2021 12:00) (14 - 26)  SpO2: 96% (08 Apr 2021 12:00) (90% - 100%)  Constitutional: NAD, awake and alert, well-developed  HEENT: PERR, EOMI, Normal Hearing, MMM  Neck: Soft and supple, No LAD, No JVD  Respiratory: Breath sounds are clear bilaterally, No wheezing, rales or rhonchi  Cardiovascular: S1 and S2, regular rate and rhythm, no Murmurs, gallops or rubs  Gastrointestinal: Bowel Sounds present, soft, nontender, nondistended, no guarding, no rebound  Extremities: No peripheral edema  Vascular: 2+ peripheral pulses  Neurological: A/O x 3, no focal deficits  Musculoskeletal: 5/5 strength b/l upper and lower extremities  Skin: No rashes    MEDICATIONS  (STANDING):  ARIPiprazole 2 milliGRAM(s) Oral daily  atorvastatin 10 milliGRAM(s) Oral at bedtime  cefTRIAXone Injectable. 1000 milliGRAM(s) IV Push every 24 hours  desvenlafaxine  milliGRAM(s) Oral daily  hydrALAZINE 10 milliGRAM(s) Oral once  mirtazapine 45 milliGRAM(s) Oral at bedtime      LABS:                         11.2   6.38  )-----------( 149      ( 08 Apr 2021 06:37 )             35.2     04-08    142  |  111<H>  |  24<H>  ----------------------------<  84  4.1   |  24  |  1.44<H>    Ca    8.4<L>      08 Apr 2021 06:37      Assessment and Plan:   · Assessment    89F w/ PMHx of paroxysmal Afib, LBBB, MI, aortic stenosis, DVT, HTN, HLD, mild dementia was sent in by cardiologist for complete heart block.     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

## 2021-04-08 NOTE — PHYSICAL THERAPY INITIAL EVALUATION ADULT - LEVEL OF INDEPENDENCE: SIT/STAND, REHAB EVAL
bed height HIGHER in CCU ,pt is petite and difficult for pt to plant B feet when seated at EOB due to short stature,increased bed height/minimum assist (75% patients effort)

## 2021-04-08 NOTE — PHYSICAL THERAPY INITIAL EVALUATION ADULT - LIVES WITH, PROFILE
pt has her own ground level apt in daughters home,pt will ascend 6-7 steps to 1st floor of home when dining with daughter & her family using HRx1

## 2021-04-08 NOTE — PHYSICAL THERAPY INITIAL EVALUATION ADULT - DISCHARGE DISPOSITION, PT EVAL
has [facility] ; daughter is primary caretaker days ,would benefit from HHA nights to allow daughter to get needed rest/home w/ assist/home w/ home PT

## 2021-04-08 NOTE — PROGRESS NOTE ADULT - ASSESSMENT
89 year old female with history of HTN on Metoprolol, PAF not on OAC, dementia, LBBB with hemodynamically stable complete heart block.    A/P: s/p single chamber transvenous PPM implant, POD #1  MICRA AV was aborted due to tortuous vessels,  She required temporary pacing wire via right groin during procedure.  Right groin site is soft, stitch removed this AM.  Post op instructions reviewed with patient.  Beta blocker can be resumed.  Follow up as outpatient in 2 weeks as outpatient in EP clinic.  Pt needs to get OOB to chair and ambulate.  Device interrogation this morning reveals normal function.  Medtronic Marge, VVIR @ 60 bpm.  RV threshold 0.5V @ 0.4ms  Stable from EP standpoint.   89 year old female with history of HTN on Metoprolol, PAF not on OAC, dementia, LBBB with hemodynamically stable complete heart block.    A/P: s/p single chamber transvenous PPM implant, POD #1  MICRA AV was aborted due to tortuous vessels,  She required transcutaneous pacing and temporary pacing wire during procedure.  Right groin site is soft, stitch removed this AM.  Post op instructions reviewed with patient.  Beta blocker can be resumed.  Follow up as outpatient in 2 weeks as outpatient in EP clinic.  Pt needs to get OOB to chair and ambulate.  Device interrogation this morning reveals normal function.  [employer]onic [employer], VVIR @ 60 bpm.  RV threshold 0.5V @ 0.4ms  Stable from EP standpoint.

## 2021-04-08 NOTE — DISCHARGE NOTE PROVIDER - NSDCCPTREATMENT_GEN_ALL_CORE_FT
PRINCIPAL PROCEDURE  Procedure: Insertion, pacemaker, single chamber, permanent  Findings and Treatment:

## 2021-04-08 NOTE — DISCHARGE NOTE PROVIDER - NSDCMRMEDTOKEN_GEN_ALL_CORE_FT
Abilify 2 mg oral tablet: 1 tab(s) orally once a day  alendronate 70 mg oral tablet: 1 tab(s) orally once a week  amLODIPine 5 mg oral tablet: 1 tab(s) orally once a day  atorvastatin 10 mg oral tablet: 1 tab(s) orally once a day (at bedtime)  busPIRone 30 mg oral tablet: 1 tab(s) orally once a day (in the evening)  Metoprolol Tartrate 25 mg oral tablet: 0.5 tab(s) orally once a day (in the evening)  mirtazapine 45 mg oral tablet: 1 tab(s) orally once a day (at bedtime)  pantoprazole 20 mg oral delayed release tablet: 1 tab(s) orally once a day  Pristiq 100 mg oral tablet, extended release: 1 tab(s) orally once a day

## 2021-04-08 NOTE — DISCHARGE NOTE PROVIDER - NSDCCPCAREPLAN_GEN_ALL_CORE_FT
PRINCIPAL DISCHARGE DIAGNOSIS  Diagnosis: Symptomatic bradycardia  Assessment and Plan of Treatment:       SECONDARY DISCHARGE DIAGNOSES  Diagnosis: Complete heart block  Assessment and Plan of Treatment:

## 2021-04-08 NOTE — DISCHARGE NOTE PROVIDER - HOSPITAL COURSE
Patient admitted from cardiology office for complete heart block. Admitted to CCU. pacemaker pads placed. Pt ruled out for MI. Seen by cardiology and EP. underwent placement of pacemaker. HR improved. bb and ca channel blocker held on admission. Cleared for resumption after pacemaker. Able to ambulate with walker with PT

## 2021-04-08 NOTE — PHYSICAL THERAPY INITIAL EVALUATION ADULT - LEVEL OF INDEPENDENCE: SIT/SUPINE, REHAB EVAL
out of bed to chair at bedside with daughter visiting,TT in place,PO beverages provided ,all personal needs met

## 2021-04-08 NOTE — PHYSICAL THERAPY INITIAL EVALUATION ADULT - GENERAL OBSERVATIONS, REHAB EVAL
recumbent in bed semi-reclined with BLEs flexed up ,kyphotic posture,moderate fwd head alignment ,PPM site R CW clean /dry,Dermabond intact with scant/minimal bruising nestor-incisional ; pt wearing NC O2 2L/min ,arousable,[place],dozes off readily when not engaged ; daughter at bedside for support provided bulk of background info

## 2021-04-08 NOTE — DISCHARGE NOTE PROVIDER - DETAILS OF MALNUTRITION DIAGNOSIS/DIAGNOSES
This patient has been assessed with a concern for Malnutrition and was treated during this hospitalization for the following Nutrition diagnosis/diagnoses:     -  04/06/2021: Severe protein-calorie malnutrition

## 2021-04-08 NOTE — PHYSICAL THERAPY INITIAL EVALUATION ADULT - PERSONAL SAFETY AND JUDGMENT, REHAB EVAL
per daughter gets up 3-4x nightly for variety of reasons and daughter gets poor nights sleep as afraid pt will fall/at risk behaviors demonstrated

## 2021-04-09 ENCOUNTER — NON-APPOINTMENT (OUTPATIENT)
Age: 86
End: 2021-04-09

## 2021-04-09 LAB
CULTURE RESULTS: NO GROWTH — SIGNIFICANT CHANGE UP
SPECIMEN SOURCE: SIGNIFICANT CHANGE UP

## 2021-04-14 ENCOUNTER — APPOINTMENT (OUTPATIENT)
Dept: FAMILY MEDICINE | Facility: CLINIC | Age: 86
End: 2021-04-14
Payer: MEDICARE

## 2021-04-14 ENCOUNTER — NON-APPOINTMENT (OUTPATIENT)
Age: 86
End: 2021-04-14

## 2021-04-14 VITALS
HEIGHT: 63 IN | HEART RATE: 88 BPM | WEIGHT: 98 LBS | SYSTOLIC BLOOD PRESSURE: 115 MMHG | BODY MASS INDEX: 17.36 KG/M2 | DIASTOLIC BLOOD PRESSURE: 75 MMHG | TEMPERATURE: 97.9 F | OXYGEN SATURATION: 96 %

## 2021-04-14 PROCEDURE — 99496 TRANSJ CARE MGMT HIGH F2F 7D: CPT

## 2021-04-14 NOTE — PLAN
[FreeTextEntry1] : Advised at length\par Await echo from Dr Lucero\par Have Dr. Lucero send copy of EKG\par Continue present meds

## 2021-04-14 NOTE — HEALTH RISK ASSESSMENT
[Monthly or less (1 pt)] : Monthly or less (1 point) [1 or 2 (0 pts)] : 1 or 2 (0 points) [Never (0 pts)] : Never (0 points) [No] : In the past 12 months have you used drugs other than those required for medical reasons? No [Any fall with injury in past year] : Patient reported fall with injury in the past year [0] : 2) Feeling down, depressed, or hopeless: Not at all (0) [] : No [Audit-CScore] : 1 [[id number]] : 0

## 2021-04-14 NOTE — PHYSICAL EXAM
[Normal] : no respiratory distress, lungs were clear to auscultation bilaterally and no accessory muscle use [Normal Rate] : normal rate  [Regular Rhythm] : with a regular rhythm [de-identified] : 3/6 systolic murmur

## 2021-04-14 NOTE — HISTORY OF PRESENT ILLNESS
[Post-hospitalization from ___ Hospital] : Post-hospitalization from [unfilled] Hospital [Admitted on: ___] : The patient was admitted on [unfilled] [Discharged on ___] : discharged on [unfilled] [Discharge Summary] : discharge summary [Discharge Med List] : discharge medication list [Med Reconciliation] : medication reconciliation has been completed [FreeTextEntry2] : Pt is here for a follow up from the hospital.\par Admitted with complete heart block. Temporary ext pacemaker patches applied. THen taken to OR for single chamber pacemaker placement. Pt tolerated well. Saw Dr. Lucero 2 days ago. Wants patient to go for echocardiogram. \par Still with mild BARNETT.

## 2021-04-20 DIAGNOSIS — Z96.643 PRESENCE OF ARTIFICIAL HIP JOINT, BILATERAL: ICD-10-CM

## 2021-04-20 DIAGNOSIS — R00.1 BRADYCARDIA, UNSPECIFIED: ICD-10-CM

## 2021-04-20 DIAGNOSIS — I12.9 HYPERTENSIVE CHRONIC KIDNEY DISEASE WITH STAGE 1 THROUGH STAGE 4 CHRONIC KIDNEY DISEASE, OR UNSPECIFIED CHRONIC KIDNEY DISEASE: ICD-10-CM

## 2021-04-20 DIAGNOSIS — E43 UNSPECIFIED SEVERE PROTEIN-CALORIE MALNUTRITION: ICD-10-CM

## 2021-04-20 DIAGNOSIS — N18.30 CHRONIC KIDNEY DISEASE, STAGE 3 UNSPECIFIED: ICD-10-CM

## 2021-04-20 DIAGNOSIS — I44.7 LEFT BUNDLE-BRANCH BLOCK, UNSPECIFIED: ICD-10-CM

## 2021-04-20 DIAGNOSIS — I27.20 PULMONARY HYPERTENSION, UNSPECIFIED: ICD-10-CM

## 2021-04-20 DIAGNOSIS — Z86.718 PERSONAL HISTORY OF OTHER VENOUS THROMBOSIS AND EMBOLISM: ICD-10-CM

## 2021-04-20 DIAGNOSIS — F03.90 UNSPECIFIED DEMENTIA WITHOUT BEHAVIORAL DISTURBANCE: ICD-10-CM

## 2021-04-20 DIAGNOSIS — I44.2 ATRIOVENTRICULAR BLOCK, COMPLETE: ICD-10-CM

## 2021-04-20 DIAGNOSIS — I35.0 NONRHEUMATIC AORTIC (VALVE) STENOSIS: ICD-10-CM

## 2021-04-20 DIAGNOSIS — Z79.82 LONG TERM (CURRENT) USE OF ASPIRIN: ICD-10-CM

## 2021-04-20 DIAGNOSIS — E78.5 HYPERLIPIDEMIA, UNSPECIFIED: ICD-10-CM

## 2021-04-20 DIAGNOSIS — N39.0 URINARY TRACT INFECTION, SITE NOT SPECIFIED: ICD-10-CM

## 2021-04-20 DIAGNOSIS — I48.0 PAROXYSMAL ATRIAL FIBRILLATION: ICD-10-CM

## 2021-04-20 DIAGNOSIS — N17.9 ACUTE KIDNEY FAILURE, UNSPECIFIED: ICD-10-CM

## 2021-04-20 DIAGNOSIS — F32.9 MAJOR DEPRESSIVE DISORDER, SINGLE EPISODE, UNSPECIFIED: ICD-10-CM

## 2021-04-20 DIAGNOSIS — I25.2 OLD MYOCARDIAL INFARCTION: ICD-10-CM

## 2021-04-23 ENCOUNTER — NON-APPOINTMENT (OUTPATIENT)
Age: 86
End: 2021-04-23

## 2021-04-23 ENCOUNTER — APPOINTMENT (OUTPATIENT)
Dept: ELECTROPHYSIOLOGY | Facility: CLINIC | Age: 86
End: 2021-04-23
Payer: MEDICARE

## 2021-04-23 VITALS
OXYGEN SATURATION: 100 % | DIASTOLIC BLOOD PRESSURE: 74 MMHG | BODY MASS INDEX: 18.43 KG/M2 | WEIGHT: 104 LBS | SYSTOLIC BLOOD PRESSURE: 148 MMHG | HEIGHT: 63 IN | RESPIRATION RATE: 14 BRPM | HEART RATE: 80 BPM

## 2021-04-23 PROCEDURE — 93279 PRGRMG DEV EVAL PM/LDLS PM: CPT

## 2021-04-23 PROCEDURE — 99024 POSTOP FOLLOW-UP VISIT: CPT

## 2021-05-04 ENCOUNTER — APPOINTMENT (OUTPATIENT)
Dept: ELECTROPHYSIOLOGY | Facility: CLINIC | Age: 86
End: 2021-05-04
Payer: MEDICARE

## 2021-05-04 ENCOUNTER — NON-APPOINTMENT (OUTPATIENT)
Age: 86
End: 2021-05-04

## 2021-05-04 VITALS
HEART RATE: 73 BPM | SYSTOLIC BLOOD PRESSURE: 148 MMHG | BODY MASS INDEX: 18.43 KG/M2 | OXYGEN SATURATION: 99 % | RESPIRATION RATE: 14 BRPM | WEIGHT: 104 LBS | HEIGHT: 63 IN | DIASTOLIC BLOOD PRESSURE: 74 MMHG

## 2021-05-04 DIAGNOSIS — Z95.0 PRESENCE OF CARDIAC PACEMAKER: ICD-10-CM

## 2021-05-04 PROCEDURE — 93279 PRGRMG DEV EVAL PM/LDLS PM: CPT

## 2021-07-12 ENCOUNTER — RX RENEWAL (OUTPATIENT)
Age: 86
End: 2021-07-12

## 2021-07-13 ENCOUNTER — APPOINTMENT (OUTPATIENT)
Dept: ORTHOPEDIC SURGERY | Facility: CLINIC | Age: 86
End: 2021-07-13
Payer: MEDICARE

## 2021-07-13 ENCOUNTER — RX RENEWAL (OUTPATIENT)
Age: 86
End: 2021-07-13

## 2021-07-13 PROCEDURE — 73110 X-RAY EXAM OF WRIST: CPT | Mod: LT

## 2021-07-13 PROCEDURE — 99212 OFFICE O/P EST SF 10 MIN: CPT

## 2021-07-15 ENCOUNTER — APPOINTMENT (OUTPATIENT)
Dept: FAMILY MEDICINE | Facility: CLINIC | Age: 86
End: 2021-07-15
Payer: MEDICARE

## 2021-07-15 VITALS
OXYGEN SATURATION: 97 % | HEIGHT: 63 IN | SYSTOLIC BLOOD PRESSURE: 166 MMHG | HEART RATE: 87 BPM | TEMPERATURE: 97.9 F | WEIGHT: 104 LBS | BODY MASS INDEX: 18.43 KG/M2 | DIASTOLIC BLOOD PRESSURE: 85 MMHG

## 2021-07-15 DIAGNOSIS — R32 UNSPECIFIED URINARY INCONTINENCE: ICD-10-CM

## 2021-07-15 PROCEDURE — 81003 URINALYSIS AUTO W/O SCOPE: CPT | Mod: QW

## 2021-07-15 PROCEDURE — 99213 OFFICE O/P EST LOW 20 MIN: CPT | Mod: 25

## 2021-07-16 LAB
BILIRUB UR QL STRIP: NORMAL
CLARITY UR: CLEAR
COLLECTION METHOD: NORMAL
GLUCOSE UR-MCNC: NORMAL
HCG UR QL: 0.2 EU/DL
HGB UR QL STRIP.AUTO: NORMAL
KETONES UR-MCNC: NORMAL
LEUKOCYTE ESTERASE UR QL STRIP: ABNORMAL
NITRITE UR QL STRIP: NORMAL
PH UR STRIP: 7
PROT UR STRIP-MCNC: NORMAL
SP GR UR STRIP: 1.01

## 2021-07-21 LAB — BACTERIA UR CULT: NORMAL

## 2021-07-21 RX ORDER — PANTOPRAZOLE 20 MG/1
20 TABLET, DELAYED RELEASE ORAL
Qty: 90 | Refills: 0 | Status: ACTIVE | COMMUNITY
Start: 2019-11-26 | End: 1900-01-01

## 2021-07-21 NOTE — HISTORY OF PRESENT ILLNESS
[FreeTextEntry8] : Lary Fulton is a 89 year old female accompanied by her grand daughter presenting with urinary incontinence. Symptoms ongoing for about 2 years, worsening over the last few months. No bowel incontinence.

## 2021-07-22 ENCOUNTER — APPOINTMENT (OUTPATIENT)
Dept: UROLOGY | Facility: CLINIC | Age: 86
End: 2021-07-22
Payer: MEDICARE

## 2021-07-22 VITALS
WEIGHT: 104 LBS | TEMPERATURE: 97.2 F | BODY MASS INDEX: 18.43 KG/M2 | HEIGHT: 63 IN | DIASTOLIC BLOOD PRESSURE: 61 MMHG | OXYGEN SATURATION: 94 % | HEART RATE: 85 BPM | SYSTOLIC BLOOD PRESSURE: 91 MMHG

## 2021-07-22 DIAGNOSIS — N39.41 URGE INCONTINENCE: ICD-10-CM

## 2021-07-22 PROCEDURE — 99203 OFFICE O/P NEW LOW 30 MIN: CPT

## 2021-07-22 NOTE — HISTORY OF PRESENT ILLNESS
[FreeTextEntry1] : This patient presents with a complaint of nocturia and urge incontinence at night worsening over 2 years. She has no other complaints and denies any other urinary symptoms. Her post void residual is minimal.

## 2021-07-22 NOTE — ASSESSMENT
[FreeTextEntry1] : Labs are ordered. Patient will be placed on a mild diuretic and oxybutynin for her symptoms. She will return to the office for a urodynamics study.

## 2021-07-23 LAB
APPEARANCE: CLEAR
BACTERIA: NEGATIVE
BILIRUBIN URINE: NEGATIVE
BLOOD URINE: NEGATIVE
COLOR: NORMAL
GLUCOSE QUALITATIVE U: NEGATIVE
HYALINE CASTS: 0 /LPF
KETONES URINE: NEGATIVE
LEUKOCYTE ESTERASE URINE: NEGATIVE
MICROSCOPIC-UA: NORMAL
NITRITE URINE: NEGATIVE
PH URINE: 7
PROTEIN URINE: NORMAL
RED BLOOD CELLS URINE: 0 /HPF
SPECIFIC GRAVITY URINE: 1.01
SQUAMOUS EPITHELIAL CELLS: 0 /HPF
UROBILINOGEN URINE: NORMAL
WHITE BLOOD CELLS URINE: 1 /HPF

## 2021-07-25 LAB
BACTERIA UR CULT: NORMAL
URINE CYTOLOGY: NORMAL

## 2021-08-02 ENCOUNTER — TRANSCRIPTION ENCOUNTER (OUTPATIENT)
Age: 86
End: 2021-08-02

## 2021-08-03 ENCOUNTER — APPOINTMENT (OUTPATIENT)
Dept: ELECTROPHYSIOLOGY | Facility: CLINIC | Age: 86
End: 2021-08-03

## 2021-08-08 ENCOUNTER — RX RENEWAL (OUTPATIENT)
Age: 86
End: 2021-08-08

## 2021-08-16 ENCOUNTER — APPOINTMENT (OUTPATIENT)
Dept: ORTHOPEDIC SURGERY | Facility: CLINIC | Age: 86
End: 2021-08-16
Payer: MEDICARE

## 2021-08-16 VITALS
HEIGHT: 60 IN | WEIGHT: 98 LBS | DIASTOLIC BLOOD PRESSURE: 76 MMHG | SYSTOLIC BLOOD PRESSURE: 148 MMHG | BODY MASS INDEX: 19.24 KG/M2 | HEART RATE: 88 BPM

## 2021-08-16 PROCEDURE — 99213 OFFICE O/P EST LOW 20 MIN: CPT

## 2021-08-16 PROCEDURE — 73110 X-RAY EXAM OF WRIST: CPT | Mod: LT

## 2021-08-16 NOTE — ASSESSMENT
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 Risks and benefits of continued closed treatment versus surgical intervention for hardware removal/ORIF were discussed at length with the patient and her daughter they would like to continue conservative treatment at this time with the addition of a bone stimulator. Followup in 5-6 weeks for repeat x-rays and evaluation.

## 2021-08-16 NOTE — PHYSICAL EXAM
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

## 2021-08-16 NOTE — PHYSICAL EXAM
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

## 2021-08-16 NOTE — ASSESSMENT
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 Risks and benefits of continued closed treatment versus surgical intervention for hardware removal/ORIF were discussed at length with the patient and her daughter they would like to continue conservative treatment at this time. She may continue light activity as tolerated and follow up p.r.n.

## 2021-08-16 NOTE — HISTORY OF PRESENT ILLNESS
[FreeTextEntry1] : 89-year-old female presents for evaluation of left wrist pain. She denies specific trauma or inciting event. She complains of point tenderness at the distal radius that is worse with activity and improved with rest.\par \par 07/13/2021: Patient presents for reevaluation of her left wrist pain. She reports that she has had a worsening of the pain over the last month or so without any significant injury or inciting event. She was seen by Dr. Bell for evaluation and discussion for possible treatment of her nonunion distal radius fracture and was advised at that time to continue observation due to no significant pain.  She reports now that she has had increasing pain and she presents to discuss options for treatment.

## 2021-08-26 ENCOUNTER — APPOINTMENT (OUTPATIENT)
Dept: UROLOGY | Facility: CLINIC | Age: 86
End: 2021-08-26
Payer: MEDICARE

## 2021-08-26 VITALS
BODY MASS INDEX: 19.24 KG/M2 | SYSTOLIC BLOOD PRESSURE: 93 MMHG | WEIGHT: 98 LBS | DIASTOLIC BLOOD PRESSURE: 60 MMHG | HEIGHT: 60 IN | OXYGEN SATURATION: 96 % | HEART RATE: 92 BPM

## 2021-08-26 DIAGNOSIS — R35.1 NOCTURIA: ICD-10-CM

## 2021-08-26 PROCEDURE — 51797 INTRAABDOMINAL PRESSURE TEST: CPT

## 2021-08-26 PROCEDURE — 51741 ELECTRO-UROFLOWMETRY FIRST: CPT

## 2021-08-26 PROCEDURE — 51728 CYSTOMETROGRAM W/VP: CPT

## 2021-08-26 PROCEDURE — 51784 ANAL/URINARY MUSCLE STUDY: CPT

## 2021-08-26 PROCEDURE — 51798 US URINE CAPACITY MEASURE: CPT | Mod: 59

## 2021-08-28 PROBLEM — R35.1 NOCTURIA: Status: ACTIVE | Noted: 2021-07-22

## 2021-08-30 RX ORDER — NYSTATIN 100000 [USP'U]/G
100000 CREAM TOPICAL TWICE DAILY
Qty: 1 | Refills: 0 | Status: ACTIVE | COMMUNITY
Start: 2019-05-02 | End: 1900-01-01

## 2021-09-16 ENCOUNTER — APPOINTMENT (OUTPATIENT)
Dept: GASTROENTEROLOGY | Facility: CLINIC | Age: 86
End: 2021-09-16

## 2021-09-20 ENCOUNTER — APPOINTMENT (OUTPATIENT)
Dept: ORTHOPEDIC SURGERY | Facility: CLINIC | Age: 86
End: 2021-09-20
Payer: MEDICARE

## 2021-09-20 PROCEDURE — 73110 X-RAY EXAM OF WRIST: CPT | Mod: LT

## 2021-09-20 PROCEDURE — 99212 OFFICE O/P EST SF 10 MIN: CPT

## 2021-09-20 NOTE — HISTORY OF PRESENT ILLNESS
[FreeTextEntry1] : 89-year-old female presents for evaluation of left wrist pain. She denies specific trauma or inciting event. She complains of point tenderness at the distal radius that is worse with activity and improved with rest.\par \par 07/13/2021: Patient presents for reevaluation of her left wrist pain. She reports that she has had a worsening of the pain over the last month or so without any significant injury or inciting event. She was seen by Dr. Bell for evaluation and discussion for possible treatment of her nonunion distal radius fracture and was advised at that time to continue observation due to no significant pain.  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 She continues to have mild to moderate activity related pain at the fracture siteand would like to discuss further treatment options.\par \par 09/20/2021: Patient presents for reevalaution of her left wrist radial shaft nonunion. She continues to have pain

## 2021-09-20 NOTE — PHYSICAL EXAM
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

## 2021-09-21 ENCOUNTER — APPOINTMENT (OUTPATIENT)
Dept: CARDIOLOGY | Facility: CLINIC | Age: 86
End: 2021-09-21

## 2021-10-31 ENCOUNTER — RX RENEWAL (OUTPATIENT)
Age: 86
End: 2021-10-31

## 2021-11-01 ENCOUNTER — APPOINTMENT (OUTPATIENT)
Dept: ELECTROPHYSIOLOGY | Facility: CLINIC | Age: 86
End: 2021-11-01
Payer: MEDICARE

## 2021-11-02 ENCOUNTER — NON-APPOINTMENT (OUTPATIENT)
Age: 86
End: 2021-11-02

## 2021-11-02 PROCEDURE — 93296 REM INTERROG EVL PM/IDS: CPT

## 2021-11-02 PROCEDURE — 93294 REM INTERROG EVL PM/LDLS PM: CPT | Mod: NC

## 2021-12-06 ENCOUNTER — APPOINTMENT (OUTPATIENT)
Dept: ORTHOPEDIC SURGERY | Facility: CLINIC | Age: 86
End: 2021-12-06
Payer: MEDICARE

## 2021-12-06 DIAGNOSIS — S52.602K: ICD-10-CM

## 2021-12-06 DIAGNOSIS — S52.502K: ICD-10-CM

## 2021-12-06 PROCEDURE — 73090 X-RAY EXAM OF FOREARM: CPT | Mod: LT

## 2021-12-06 PROCEDURE — 99213 OFFICE O/P EST LOW 20 MIN: CPT

## 2021-12-07 NOTE — HISTORY OF PRESENT ILLNESS
[FreeTextEntry1] : 89-year-old female presents for evaluation of left wrist pain. She denies specific trauma or inciting event. She complains of point tenderness at the distal radius that is worse with activity and improved with rest.\par \par 07/13/2021: Patient presents for reevaluation of her left wrist pain. She reports that she has had a worsening of the pain over the last month or so without any significant injury or inciting event. She was seen by Dr. Bell for evaluation and discussion for possible treatment of her nonunion distal radius fracture and was advised at that time to continue observation due to no significant pain.  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 She continues to have pain.\par \par 12/06/2021: Patient presents for fu of her left wrist radial shaft non-uninon. She reports marked improvement in her symptoms s/p use of the bone stim. She denies pain at this time. She denies use of a wrist brace. She arrives in a wheelchair today. She presents for repeat xrays.

## 2021-12-07 NOTE — ASSESSMENT
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 \par She is healing clinically. She has been recommended continued use of the bone stim at this time, and will follow up on an as needed basis.

## 2021-12-07 NOTE — PHYSICAL EXAM
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

## 2021-12-13 ENCOUNTER — RX RENEWAL (OUTPATIENT)
Age: 86
End: 2021-12-13

## 2021-12-28 ENCOUNTER — APPOINTMENT (OUTPATIENT)
Dept: FAMILY MEDICINE | Facility: CLINIC | Age: 86
End: 2021-12-28

## 2021-12-28 ENCOUNTER — INPATIENT (INPATIENT)
Facility: HOSPITAL | Age: 86
LOS: 1 days | Discharge: HOME HEALTH | DRG: 177 | End: 2021-12-30
Attending: FAMILY MEDICINE | Admitting: FAMILY MEDICINE
Payer: MEDICARE

## 2021-12-28 ENCOUNTER — TRANSCRIPTION ENCOUNTER (OUTPATIENT)
Age: 86
End: 2021-12-28

## 2021-12-28 VITALS
HEART RATE: 85 BPM | SYSTOLIC BLOOD PRESSURE: 151 MMHG | OXYGEN SATURATION: 92 % | DIASTOLIC BLOOD PRESSURE: 58 MMHG | TEMPERATURE: 98 F | RESPIRATION RATE: 18 BRPM | HEIGHT: 60 IN

## 2021-12-28 DIAGNOSIS — Z98.890 OTHER SPECIFIED POSTPROCEDURAL STATES: Chronic | ICD-10-CM

## 2021-12-28 DIAGNOSIS — Z96.643 PRESENCE OF ARTIFICIAL HIP JOINT, BILATERAL: Chronic | ICD-10-CM

## 2021-12-28 DIAGNOSIS — U07.1 COVID-19: ICD-10-CM

## 2021-12-28 DIAGNOSIS — S69.90XA UNSPECIFIED INJURY OF UNSPECIFIED WRIST, HAND AND FINGER(S), INITIAL ENCOUNTER: Chronic | ICD-10-CM

## 2021-12-28 LAB
ALBUMIN SERPL ELPH-MCNC: 3.3 G/DL — SIGNIFICANT CHANGE UP (ref 3.3–5)
ALP SERPL-CCNC: 67 U/L — SIGNIFICANT CHANGE UP (ref 40–120)
ALT FLD-CCNC: 16 U/L — SIGNIFICANT CHANGE UP (ref 12–78)
ANION GAP SERPL CALC-SCNC: 8 MMOL/L — SIGNIFICANT CHANGE UP (ref 5–17)
AST SERPL-CCNC: 22 U/L — SIGNIFICANT CHANGE UP (ref 15–37)
BASE EXCESS BLDV CALC-SCNC: 2.6 MMOL/L — SIGNIFICANT CHANGE UP
BASOPHILS # BLD AUTO: 0.03 K/UL — SIGNIFICANT CHANGE UP (ref 0–0.2)
BASOPHILS NFR BLD AUTO: 0.4 % — SIGNIFICANT CHANGE UP (ref 0–2)
BILIRUB SERPL-MCNC: 0.5 MG/DL — SIGNIFICANT CHANGE UP (ref 0.2–1.2)
BUN SERPL-MCNC: 26 MG/DL — HIGH (ref 7–23)
CALCIUM SERPL-MCNC: 8.9 MG/DL — SIGNIFICANT CHANGE UP (ref 8.5–10.1)
CHLORIDE SERPL-SCNC: 99 MMOL/L — SIGNIFICANT CHANGE UP (ref 96–108)
CO2 BLDV-SCNC: 28 MMOL/L — HIGH (ref 22–26)
CO2 SERPL-SCNC: 27 MMOL/L — SIGNIFICANT CHANGE UP (ref 22–31)
CREAT SERPL-MCNC: 1.45 MG/DL — HIGH (ref 0.5–1.3)
D DIMER BLD IA.RAPID-MCNC: 286 NG/ML DDU — HIGH
EOSINOPHIL # BLD AUTO: 0.02 K/UL — SIGNIFICANT CHANGE UP (ref 0–0.5)
EOSINOPHIL NFR BLD AUTO: 0.3 % — SIGNIFICANT CHANGE UP (ref 0–6)
FLUAV AG NPH QL: SIGNIFICANT CHANGE UP
FLUBV AG NPH QL: SIGNIFICANT CHANGE UP
GAS PNL BLDV: SIGNIFICANT CHANGE UP
GLUCOSE SERPL-MCNC: 130 MG/DL — HIGH (ref 70–99)
HCO3 BLDV-SCNC: 27 MMOL/L — SIGNIFICANT CHANGE UP (ref 22–29)
HCT VFR BLD CALC: 36.4 % — SIGNIFICANT CHANGE UP (ref 34.5–45)
HGB BLD-MCNC: 12.2 G/DL — SIGNIFICANT CHANGE UP (ref 11.5–15.5)
IMM GRANULOCYTES NFR BLD AUTO: 0.4 % — SIGNIFICANT CHANGE UP (ref 0–1.5)
LDH SERPL L TO P-CCNC: 157 U/L — SIGNIFICANT CHANGE UP (ref 84–241)
LYMPHOCYTES # BLD AUTO: 0.42 K/UL — LOW (ref 1–3.3)
LYMPHOCYTES # BLD AUTO: 5.3 % — LOW (ref 13–44)
MAGNESIUM SERPL-MCNC: 2.1 MG/DL — SIGNIFICANT CHANGE UP (ref 1.6–2.6)
MCHC RBC-ENTMCNC: 30.5 PG — SIGNIFICANT CHANGE UP (ref 27–34)
MCHC RBC-ENTMCNC: 33.5 GM/DL — SIGNIFICANT CHANGE UP (ref 32–36)
MCV RBC AUTO: 91 FL — SIGNIFICANT CHANGE UP (ref 80–100)
MONOCYTES # BLD AUTO: 0.85 K/UL — SIGNIFICANT CHANGE UP (ref 0–0.9)
MONOCYTES NFR BLD AUTO: 10.7 % — SIGNIFICANT CHANGE UP (ref 2–14)
NEUTROPHILS # BLD AUTO: 6.63 K/UL — SIGNIFICANT CHANGE UP (ref 1.8–7.4)
NEUTROPHILS NFR BLD AUTO: 82.9 % — HIGH (ref 43–77)
PCO2 BLDV: 41 MMHG — SIGNIFICANT CHANGE UP (ref 39–42)
PH BLDV: 7.43 — SIGNIFICANT CHANGE UP (ref 7.32–7.43)
PHOSPHATE SERPL-MCNC: 3.5 MG/DL — SIGNIFICANT CHANGE UP (ref 2.5–4.5)
PLATELET # BLD AUTO: 208 K/UL — SIGNIFICANT CHANGE UP (ref 150–400)
PO2 BLDV: 52 MMHG — SIGNIFICANT CHANGE UP
POTASSIUM SERPL-MCNC: 3.7 MMOL/L — SIGNIFICANT CHANGE UP (ref 3.5–5.3)
POTASSIUM SERPL-SCNC: 3.7 MMOL/L — SIGNIFICANT CHANGE UP (ref 3.5–5.3)
PROT SERPL-MCNC: 7 GM/DL — SIGNIFICANT CHANGE UP (ref 6–8.3)
RBC # BLD: 4 M/UL — SIGNIFICANT CHANGE UP (ref 3.8–5.2)
RBC # FLD: 13 % — SIGNIFICANT CHANGE UP (ref 10.3–14.5)
RSV RNA NPH QL NAA+NON-PROBE: SIGNIFICANT CHANGE UP
SAO2 % BLDV: 85.6 % — SIGNIFICANT CHANGE UP
SARS-COV-2 RNA SPEC QL NAA+PROBE: DETECTED
SODIUM SERPL-SCNC: 134 MMOL/L — LOW (ref 135–145)
WBC # BLD: 7.98 K/UL — SIGNIFICANT CHANGE UP (ref 3.8–10.5)
WBC # FLD AUTO: 7.98 K/UL — SIGNIFICANT CHANGE UP (ref 3.8–10.5)

## 2021-12-28 PROCEDURE — 99285 EMERGENCY DEPT VISIT HI MDM: CPT | Mod: CS

## 2021-12-28 PROCEDURE — 83735 ASSAY OF MAGNESIUM: CPT

## 2021-12-28 PROCEDURE — 36415 COLL VENOUS BLD VENIPUNCTURE: CPT

## 2021-12-28 PROCEDURE — 99223 1ST HOSP IP/OBS HIGH 75: CPT

## 2021-12-28 PROCEDURE — 93010 ELECTROCARDIOGRAM REPORT: CPT

## 2021-12-28 PROCEDURE — 93005 ELECTROCARDIOGRAM TRACING: CPT

## 2021-12-28 PROCEDURE — 83036 HEMOGLOBIN GLYCOSYLATED A1C: CPT

## 2021-12-28 PROCEDURE — 86140 C-REACTIVE PROTEIN: CPT

## 2021-12-28 PROCEDURE — 83615 LACTATE (LD) (LDH) ENZYME: CPT

## 2021-12-28 PROCEDURE — 84100 ASSAY OF PHOSPHORUS: CPT

## 2021-12-28 PROCEDURE — 85379 FIBRIN DEGRADATION QUANT: CPT

## 2021-12-28 PROCEDURE — 71045 X-RAY EXAM CHEST 1 VIEW: CPT | Mod: 26

## 2021-12-28 PROCEDURE — 82728 ASSAY OF FERRITIN: CPT

## 2021-12-28 RX ORDER — ACETAMINOPHEN 500 MG
650 TABLET ORAL EVERY 4 HOURS
Refills: 0 | Status: DISCONTINUED | OUTPATIENT
Start: 2021-12-28 | End: 2021-12-30

## 2021-12-28 RX ORDER — PANTOPRAZOLE SODIUM 20 MG/1
40 TABLET, DELAYED RELEASE ORAL
Refills: 0 | Status: DISCONTINUED | OUTPATIENT
Start: 2021-12-28 | End: 2021-12-30

## 2021-12-28 RX ORDER — ALBUTEROL 90 UG/1
2 AEROSOL, METERED ORAL EVERY 4 HOURS
Refills: 0 | Status: DISCONTINUED | OUTPATIENT
Start: 2021-12-28 | End: 2021-12-30

## 2021-12-28 RX ORDER — DESVENLAFAXINE 50 MG/1
1 TABLET, EXTENDED RELEASE ORAL
Qty: 0 | Refills: 0 | DISCHARGE

## 2021-12-28 RX ORDER — ARIPIPRAZOLE 15 MG/1
2 TABLET ORAL DAILY
Refills: 0 | Status: DISCONTINUED | OUTPATIENT
Start: 2021-12-28 | End: 2021-12-30

## 2021-12-28 RX ORDER — METOPROLOL TARTRATE 50 MG
25 TABLET ORAL DAILY
Refills: 0 | Status: DISCONTINUED | OUTPATIENT
Start: 2021-12-28 | End: 2021-12-30

## 2021-12-28 RX ORDER — AMLODIPINE BESYLATE 2.5 MG/1
5 TABLET ORAL DAILY
Refills: 0 | Status: DISCONTINUED | OUTPATIENT
Start: 2021-12-28 | End: 2021-12-30

## 2021-12-28 RX ORDER — DEXAMETHASONE 0.5 MG/5ML
6 ELIXIR ORAL DAILY
Refills: 0 | Status: DISCONTINUED | OUTPATIENT
Start: 2021-12-29 | End: 2021-12-30

## 2021-12-28 RX ORDER — IPRATROPIUM/ALBUTEROL SULFATE 18-103MCG
3 AEROSOL WITH ADAPTER (GRAM) INHALATION ONCE
Refills: 0 | Status: COMPLETED | OUTPATIENT
Start: 2021-12-28 | End: 2021-12-28

## 2021-12-28 RX ORDER — MIRTAZAPINE 45 MG/1
45 TABLET, ORALLY DISINTEGRATING ORAL AT BEDTIME
Refills: 0 | Status: DISCONTINUED | OUTPATIENT
Start: 2021-12-28 | End: 2021-12-30

## 2021-12-28 RX ORDER — ATORVASTATIN CALCIUM 80 MG/1
10 TABLET, FILM COATED ORAL AT BEDTIME
Refills: 0 | Status: DISCONTINUED | OUTPATIENT
Start: 2021-12-28 | End: 2021-12-30

## 2021-12-28 RX ORDER — ONDANSETRON 8 MG/1
4 TABLET, FILM COATED ORAL EVERY 6 HOURS
Refills: 0 | Status: DISCONTINUED | OUTPATIENT
Start: 2021-12-28 | End: 2021-12-30

## 2021-12-28 RX ORDER — GUAIFENESIN/DEXTROMETHORPHAN 600MG-30MG
10 TABLET, EXTENDED RELEASE 12 HR ORAL EVERY 4 HOURS
Refills: 0 | Status: DISCONTINUED | OUTPATIENT
Start: 2021-12-28 | End: 2021-12-30

## 2021-12-28 RX ORDER — ENOXAPARIN SODIUM 100 MG/ML
40 INJECTION SUBCUTANEOUS DAILY
Refills: 0 | Status: DISCONTINUED | OUTPATIENT
Start: 2021-12-28 | End: 2021-12-30

## 2021-12-28 RX ORDER — LANOLIN ALCOHOL/MO/W.PET/CERES
3 CREAM (GRAM) TOPICAL AT BEDTIME
Refills: 0 | Status: DISCONTINUED | OUTPATIENT
Start: 2021-12-28 | End: 2021-12-30

## 2021-12-28 RX ADMIN — Medication 3 MILLILITER(S): at 16:57

## 2021-12-28 RX ADMIN — Medication 15 MILLIGRAM(S): at 22:33

## 2021-12-28 RX ADMIN — MIRTAZAPINE 45 MILLIGRAM(S): 45 TABLET, ORALLY DISINTEGRATING ORAL at 22:34

## 2021-12-28 RX ADMIN — ATORVASTATIN CALCIUM 10 MILLIGRAM(S): 80 TABLET, FILM COATED ORAL at 22:33

## 2021-12-28 RX ADMIN — Medication 40 MILLIGRAM(S): at 16:57

## 2021-12-28 NOTE — ED PROVIDER NOTE - ATTENDING CONTRIBUTION TO CARE
Dr. Hudson: I have personally performed a face to face bedside history and physical examination of this patient. I have discussed the history, examination, review of systems, assessment and plan of management with the resident. I have reviewed the electronic medical record and amended it to reflect my history, review of systems, physical exam, assessment and plan.

## 2021-12-28 NOTE — ED ADULT TRIAGE NOTE - HEIGHT IN INCHES
Pt w/ PMHx HTN on Amlodipine, seizure d/o on Phenytoin p/w slurred speech and L facial droop, onset 5pm, witnessed by family. No ext weakness noted. Pt was also noted to be drooling. 0 Pt w/ PMHx HTN on Amlodipine (5 or 10 mg), seizure d/o on Phenytoin (100 mg TID, last seizure 4/2020) p/w slurred speech and L facial droop, onset 5pm, witnessed by family. No ext weakness noted. Pt was also noted to be drooling. Pt w/ 1 month of cough. Denies CP, SOB, f/c, HA, neck pain, numbness/tingling. Pt not vaccinated against COVID19, pt states home. No hx malignancy Pt w/ PMHx HTN on Amlodipine (5 or 10 mg), seizure d/o on Phenytoin (100 mg TID, last seizure 4/2020) p/w slurred speech and L facial droop, onset 5pm, witnessed by family. No ext weakness noted. Pt was also noted to be drooling. Pt w/ 1 month of cough. Denies CP, SOB, f/c, HA, neck pain, numbness/tingling, weakness other than facial droop. Pt not vaccinated against COVID19, pt states home. No hx malignancy. No f/c, GI, or  sx. No prior hx CVA

## 2021-12-28 NOTE — ED PROVIDER NOTE - OBJECTIVE STATEMENT
89F PMH HTN, prior smoker (?no formal dx of reactive airway dz but has home nebs), Complete heart block s/p PPM, presents to the ED with cough and sob for the past 2 days, states sxs improved with nebs at home. Denies fevers/chills, chest pain, abd pain, n/v/d, orthopnea/leg swelling. Didn't take any antipyretics pta. Most history obtained form daughter Alvina [phone], states that pt recently got over fevers/chills/body aches/v/d, symptoms improved and then on 12/26 pt has been having cough/sob (denies new fevers/chills/body aches/v/d).     Meds: ASA/ metoprolol/busiprone/nortryptoline/atorvastatin/mirtazipine/nebs

## 2021-12-28 NOTE — ED ADULT NURSE NOTE - OBJECTIVE STATEMENT
89 yr old female aao x 2, came in with flu like symptoms, started this morning, pt daughter brought her in for SOB, PT saturation 94% room air. no acute distress noted. call bell with in pt reach, all safety maintain.

## 2021-12-28 NOTE — ED PROVIDER NOTE - PROGRESS NOTE DETAILS
Pippa CARDENAS for Dr. Hudson: 89 y/o female with a PMHx of HTN, HLD, CAD, paroxysmal Afib presents to the ED with SOB, cough Pippa CARDENAS for Dr. Hudson: 89 y/o female with a PMHx of HTN, HLD, CAD, paroxysmal Afib presents to the ED with SOB, cough worsening past ocuple of days. Symptoms largely improved with home nebs. Pt denies fever, chills chest pain, loss of appetite, orthopnea/leg swelling. Cough is productive of white sputum. Pt has have had flu like symptoms (f/c, body aches, diarrhea). Symptoms resolved before Hair. Recent COVID test done, results unknown. Pt not COVID vaccinated. PCP: PRABHU Palmer.   On exam, patient in mild respiratory discomfort but non toxic. Lungs: Diffuse expiratory wheeze, no increased work of breathing. Abdomen benign, no leg edema or tenderness. pt states symptoms relieved with medications given in ED. Will continue to monitor, observe, reassess.

## 2021-12-28 NOTE — H&P ADULT - NSHPLABSRESULTS_GEN_ALL_CORE
.                      12.2   7.98  )-----------( 208      ( 28 Dec 2021 16:51 )             36.4     134<L>  |  99  |  26<H>  ----------------------------<  130<H>  3.7   |  27  |  1.45<H>    Ca    8.9      28 Dec 2021 16:51    TPro  7.0  /  Alb  3.3  /  TBili  0.5  /  DBili  x   /  AST  22  /  ALT  16  /  AlkPhos  67  12-28    CXR  IMPRESSION:  Unremarkable frontal chest x ray

## 2021-12-28 NOTE — H&P ADULT - HISTORY OF PRESENT ILLNESS
89F PMH HTN, prior smoker with no formal dx of reactive airway dz but has home nebulizer machine, Complete heart block s/p PPM, presents to the ED with cough and sob for the  2 days PTA, stated sxs improved with nebs at home. Denied fevers/chills, chest pain, abd pain, n/v/d, orthopnea/leg swelling. Didn't take any antipyretics pta. Most history obtained form daughter Alvina [phone], states that pt recently got over fevers/chills/body aches/v/d, symptoms improved and then on 12/26 pt has been having cough/sob (denies new fevers/chills/body aches/v/d).   Pt has never had covid vaccine despite pleading by PMD with the patient and especially her daughter who is an avid "anti-vaxer" . She was advised of the dangerous nature of covid 19 and the risks to a 89 year old.      Active Problems  Abdominal pain, acute (789.00,338.19) (R10.9)  Acute left-sided low back pain without sciatica (724.2) (M54.5)  Aortic valve disorder (424.1) (I35.9)  CAD (coronary artery disease), native coronary artery (414.01) (I25.10)  Calculus of gallbladder with chronic cholecystitis with obstruction (574.11) (K80.11)  Cardiac pacemaker in situ (V45.01) (Z95.0)  Cholelithiasis (574.20) (K80.20)  Chronic depressive disorder (301.12) (F32.9)  Chronic kidney disease, stage 3b (585.3) (N18.32)  Closed fracture of shaft of left radius with ulna with routine healing, subsequent  encounter (V54.12) (S52.202D,S52.302D)  Common bile duct stone (574.50) (K80.50)  Complete heart block by electrocardiogram (426.0) (I44.2)  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)  BARNETT (dyspnea on exertion) (786.09) (R06.00)  Dysphagia, idiopathic (787.20) (R13.10)  Essential hypertension (401.9) (I10)  HUGO (generalized anxiety disorder) (300.02) (F41.1)  Gastric ulcer (531.90) (K25.9)  Hematochezia not due to hemorrhage from anus (578.1) (K92.1)  History of depression (V11.8) (Z86.59)  Hyperlipidemia (272.4) (E78.5)  Influenza vaccine administered (V04.81) (Z23)  Intertriginous candidiasis (112.3) (B37.2)  Left bundle branch block (LBBB) (426.3) (I44.7)  Left knee pain, unspecified chronicity (719.46) (M25.562)  Localized osteoarthritis of left knee (715.36) (M17.12)  Major depression, recurrent, chronic (296.30) (F33.9)  Mild dementia (294.20) (F03.90)  Other closed extra-articular fracture of distal end of left radius with nonunion, subsequent  encounter (733.82) (S52.552K)  Pain in both wrists (719.43) (M25.531,M25.532)  PVD (peripheral vascular disease) (443.9) (I73.9)  Radius and ulna distal fracture, left, closed, with nonunion, subsequent encounter  (733.82) (S52.502K,S52.602K)  Severe aortic stenosis (424.1) (I35.0)  Sick sinus syndrome (427.81) (I49.5)  Sundowning (F05)  Visual hallucinations (368.16) (R44.1)  Weight loss (783.21) (R63.4)  Wound infection (958.3) (T14.8XXA,L08.9)  Wound infection (958.3) (T14.8XXA,L08.9)  Wrist fracture, right (814.00) (S62.101A)    Past Medical History  History of Accidental fall, initial encounter (E888.9) (W19.XXXA)  History of Aspiration of vomitus (933.1) (T17.910A)  History of Bilateral edema of lower extremity (782.3) (R60.0)  CAD (coronary artery disease), native coronary artery (414.01) (I25.10)  History of Closed fracture of shaft of left radius and ulna, initial encounter (813.23)  (S52.202A,S52.302A)  Essential hypertension (401.9) (I10)  History of deep venous thrombosis (V12.51) (Z86.718)  History of depression (V11.8) (Z86.59)  History of non-ST elevation myocardial infarction (NSTEMI) (412) (I25.2)  History of pharyngitis (V12.69) (Z87.09)  History of shortness of breath (V13.89) (Z87.898)  Hyperlipidemia (272.4) (E78.5)  History of Pain of thigh, unspecified laterality (729.5) (M79.659)  History of Preop cardiovascular exam (V72.81) (Z01.810)

## 2021-12-28 NOTE — ED PROVIDER NOTE - PHYSICAL EXAMINATION
GENERAL: no acute distress, non-toxic appearing  HEENT: normal conjunctiva, oral mucosa moist  CARDIAC: regular rate and paced rhythm, bp reassuring  PULM: mild diffuse exp wheezes, moving good air, sats 92% on RA, no incr wob  GI: abdomen nondistended, soft, nontender  : no suprapubic tenderness  NEURO: alert and oriented x 2-3, normal speech, moving all extremities without lateralization  MSK: no visible deformities, no peripheral edema, calf tenderness/redness/swelling  SKIN: no visible rashes  PSYCH: appropriate mood and affect

## 2021-12-28 NOTE — H&P ADULT - ASSESSMENT
8F PMH HTN, prior smoker,  Complete heart block s/p PPM, Depression presents for URI and COVID 19    Assessment  Acute Covid 19 URI  Depression  Mild Hypoxia  CAD  CHB with pacemaker  Hyperlipidemia  Mild Dementia  Osteoporosis  Stage 4 Renal Disease based on Cr and age    Plan:  Admit to medical bed  Respiratory isolation  Continue abilify, mirtazapine, buspar  Continue Metoprolol and amlodipine  Continue atorvastatin  O2 via NC  Follow O2 sats  IV Steroids  Hold remdesivir based on eGFR  ID Consult

## 2021-12-28 NOTE — H&P ADULT - NSHPREVIEWOFSYSTEMS_GEN_ALL_CORE
CONSTITUTIONAL: No weakness,   EYES/ENT: No visual changes;  No vertigo or throat pain   NECK: No pain or stiffness  RESPIRATORY: No  hemoptysis;   CARDIOVASCULAR: No chest pain or palpitations  GASTROINTESTINAL: No abdominal or epigastric pain. No nausea, vomiting, or hematemesis; No diarrhea or constipation. No melena or hematochezia.  GENITOURINARY: No dysuria, frequency or hematuria  NEUROLOGICAL: No numbness or weakness  SKIN: No itching, burning, rashes, or lesions   All other review of systems is negative unless indicated above

## 2021-12-28 NOTE — ED PROVIDER NOTE - CLINICAL SUMMARY MEDICAL DECISION MAKING FREE TEXT BOX
Campos, PGY3: Concern for mild COPD exac, low suspicion 2/2 bact pna/ACS/HF/PE. Will do labs, cxr, nebs, steroids. Likely tba given 92% on RA.

## 2021-12-29 LAB
A1C WITH ESTIMATED AVERAGE GLUCOSE RESULT: 5.5 % — SIGNIFICANT CHANGE UP (ref 4–5.6)
CRP SERPL-MCNC: 64 MG/L — HIGH
ESTIMATED AVERAGE GLUCOSE: 111 MG/DL — SIGNIFICANT CHANGE UP (ref 68–114)
FERRITIN SERPL-MCNC: 265 NG/ML — HIGH (ref 15–150)

## 2021-12-29 PROCEDURE — 99233 SBSQ HOSP IP/OBS HIGH 50: CPT

## 2021-12-29 PROCEDURE — 93010 ELECTROCARDIOGRAM REPORT: CPT

## 2021-12-29 RX ADMIN — Medication 15 MILLIGRAM(S): at 09:07

## 2021-12-29 RX ADMIN — Medication 6 MILLIGRAM(S): at 09:07

## 2021-12-29 RX ADMIN — Medication 3 MILLIGRAM(S): at 21:12

## 2021-12-29 RX ADMIN — ENOXAPARIN SODIUM 40 MILLIGRAM(S): 100 INJECTION SUBCUTANEOUS at 09:07

## 2021-12-29 RX ADMIN — Medication 15 MILLIGRAM(S): at 21:12

## 2021-12-29 RX ADMIN — ARIPIPRAZOLE 2 MILLIGRAM(S): 15 TABLET ORAL at 09:08

## 2021-12-29 RX ADMIN — PANTOPRAZOLE SODIUM 40 MILLIGRAM(S): 20 TABLET, DELAYED RELEASE ORAL at 05:05

## 2021-12-29 RX ADMIN — AMLODIPINE BESYLATE 5 MILLIGRAM(S): 2.5 TABLET ORAL at 09:07

## 2021-12-29 RX ADMIN — Medication 25 MILLIGRAM(S): at 09:07

## 2021-12-29 RX ADMIN — MIRTAZAPINE 45 MILLIGRAM(S): 45 TABLET, ORALLY DISINTEGRATING ORAL at 21:12

## 2021-12-29 RX ADMIN — ATORVASTATIN CALCIUM 10 MILLIGRAM(S): 80 TABLET, FILM COATED ORAL at 21:12

## 2021-12-29 NOTE — DIETITIAN INITIAL EVALUATION ADULT. - OTHER INFO
89F PMH HTN, prior smoker with no formal dx of reactive airway dz but has home nebulizer machine, Complete heart block s/p PPM, presents to the ED with cough and sob for the  2 days PTA, stated sxs improved with nebs at home. Denied fevers/chills, chest pain, abd pain, n/v/d, orthopnea/leg swelling. Didn't take any antipyretics pta. Most history obtained form daughter Alvina [phone], states that pt recently got over fevers/chills/body aches/v/d, symptoms improved and then on 12/26 pt has been having cough/sob (denies new fevers/chills/body aches/v/d).   Pt has never had covid vaccine despite pleading by PMD with the patient and especially her daughter who is an avid "anti-vaxer" . She was advised of the dangerous nature of covid 19 and the risks to a 89 year old. 89F PMH HTN, prior smoker with no formal dx of reactive airway dz but has home nebulizer machine, Complete heart block s/p PPM, presents to the ED with cough and sob for the  2 days PTA, stated sxs improved with nebs at home. Denied fevers/chills, chest pain, abd pain, n/v/d, orthopnea/leg swelling. Didn't take any antipyretics pta. Most history obtained form daughter Alvina [phone], states that pt recently got over fevers/chills/body aches/v/d, symptoms improved and then on 12/26 pt has been having cough/sob (denies new fevers/chills/body aches/v/d).   Pt has never had covid vaccine despite pleading by PMD with the patient and especially her daughter who is an avid "anti-vaxer" . She was advised of the dangerous nature of covid 19 and the risks to a 89 year old.  PT had lunch on table, but did not eat much, mostly chocolate ice  cream.   Pt to receive Ensure TID  No GI issues reported  Pt quite thin, at risk for severe malnutrition

## 2021-12-29 NOTE — DIETITIAN INITIAL EVALUATION ADULT. - MALNUTRITION
Pt meets criteria for severe protein-calorie malnutrition in context of chronic disease.  PO intake < 75% nutritional needs (est) > one month Pt meets criteria for severe protein-calorie malnutrition in context of chronic disease

## 2021-12-29 NOTE — DIETITIAN INITIAL EVALUATION ADULT. - ADD RECOMMEND
Record PO intake in EMR after each meal (nursing.) Feed assist.  Check weight.  Tray set-up.  Monitor PO intake, tolerance, labs and weight.

## 2021-12-29 NOTE — DIETITIAN INITIAL EVALUATION ADULT. - NAME AND PHONE
Zeynep Garner RDN, CDN, [facility]      [phone]   [email or web address]

## 2021-12-29 NOTE — DIETITIAN NUTRITION RISK NOTIFICATION - ADDITIONAL COMMENTS/DIETITIAN RECOMMENDATIONS
Change diet to regular to maximize intake  Add Ensure enlive 8 oz tid  Add Gelatein bid  Record PO intake in EMR after each meal (nursing.)   New weight  Feed assist  Monitor PO intake, tolerance, labs and weight.

## 2021-12-29 NOTE — DIETITIAN NUTRITION RISK NOTIFICATION - TREATMENT: THE FOLLOWING DIET HAS BEEN RECOMMENDED
Diet, Regular:   DASH/TLC {Sodium & Cholesterol Restricted} (DASH) (12-28-21 @ 21:45) [Active]

## 2021-12-29 NOTE — DIETITIAN NUTRITION RISK NOTIFICATION - FINDINGS BASED ON COMPREHENSIVE NUTRITION ASSESSMENT, CONSULTATION PERFORMED ON
Che calling     Re: insulin glargine (Lantus Solostar U-100 Insulin) 100 unit/mL (3 mL) inpn     Checking on this as written it is only a 2 day supply     Pls give them a call at [phone] 29-Dec-2021

## 2021-12-29 NOTE — DIETITIAN INITIAL EVALUATION ADULT. - PERTINENT MEDS FT
MEDICATIONS  (STANDING):  amLODIPine   Tablet 5 milliGRAM(s) Oral daily  ARIPiprazole 2 milliGRAM(s) Oral daily  atorvastatin 10 milliGRAM(s) Oral at bedtime  busPIRone 15 milliGRAM(s) Oral two times a day  dexAMETHasone     Tablet 6 milliGRAM(s) Oral daily  enoxaparin Injectable 40 milliGRAM(s) SubCutaneous daily  metoprolol tartrate 25 milliGRAM(s) Oral daily  mirtazapine 45 milliGRAM(s) Oral at bedtime  pantoprazole    Tablet 40 milliGRAM(s) Oral before breakfast    MEDICATIONS  (PRN):  acetaminophen     Tablet .. 650 milliGRAM(s) Oral every 4 hours PRN Temp greater or equal to 38C (100.4F), Mild Pain (1 - 3), Moderate Pain (4 - 6)  ALBUTerol    90 MICROgram(s) HFA Inhaler 2 Puff(s) Inhalation every 4 hours PRN Shortness of Breath and/or Wheezing  guaifenesin/dextromethorphan Oral Liquid 10 milliLiter(s) Oral every 4 hours PRN Cough  melatonin 3 milliGRAM(s) Oral at bedtime PRN Insomnia  ondansetron Injectable 4 milliGRAM(s) IV Push every 6 hours PRN Nausea and/or Vomiting

## 2021-12-30 ENCOUNTER — TRANSCRIPTION ENCOUNTER (OUTPATIENT)
Age: 86
End: 2021-12-30

## 2021-12-30 VITALS
RESPIRATION RATE: 18 BRPM | HEART RATE: 62 BPM | OXYGEN SATURATION: 92 % | TEMPERATURE: 98 F | SYSTOLIC BLOOD PRESSURE: 118 MMHG | DIASTOLIC BLOOD PRESSURE: 49 MMHG

## 2021-12-30 PROCEDURE — 99239 HOSP IP/OBS DSCHRG MGMT >30: CPT

## 2021-12-30 RX ORDER — DEXAMETHASONE 0.5 MG/5ML
1 ELIXIR ORAL
Qty: 7 | Refills: 0
Start: 2021-12-30 | End: 2022-01-05

## 2021-12-30 RX ORDER — ALBUTEROL 90 UG/1
2 AEROSOL, METERED ORAL
Qty: 8.5 | Refills: 0
Start: 2021-12-30

## 2021-12-30 RX ORDER — ACETAMINOPHEN 500 MG
2 TABLET ORAL
Qty: 0 | Refills: 0 | DISCHARGE
Start: 2021-12-30

## 2021-12-30 RX ORDER — GUAIFENESIN/DEXTROMETHORPHAN 600MG-30MG
10 TABLET, EXTENDED RELEASE 12 HR ORAL
Qty: 0 | Refills: 0 | DISCHARGE
Start: 2021-12-30

## 2021-12-30 RX ADMIN — ENOXAPARIN SODIUM 40 MILLIGRAM(S): 100 INJECTION SUBCUTANEOUS at 09:52

## 2021-12-30 RX ADMIN — Medication 6 MILLIGRAM(S): at 09:51

## 2021-12-30 RX ADMIN — ARIPIPRAZOLE 2 MILLIGRAM(S): 15 TABLET ORAL at 09:51

## 2021-12-30 RX ADMIN — PANTOPRAZOLE SODIUM 40 MILLIGRAM(S): 20 TABLET, DELAYED RELEASE ORAL at 05:00

## 2021-12-30 RX ADMIN — Medication 15 MILLIGRAM(S): at 11:06

## 2021-12-30 RX ADMIN — Medication 25 MILLIGRAM(S): at 09:51

## 2021-12-30 RX ADMIN — AMLODIPINE BESYLATE 5 MILLIGRAM(S): 2.5 TABLET ORAL at 09:51

## 2021-12-30 NOTE — DISCHARGE NOTE NURSING/CASE MANAGEMENT/SOCIAL WORK - NSDCPEFALRISK_GEN_ALL_CORE
For information on Fall & Injury Prevention, visit: [email or web address] OR  [email or web address] OR  https://www.cdc.gov/steadi/patient.html

## 2021-12-30 NOTE — DISCHARGE NOTE NURSING/CASE MANAGEMENT/SOCIAL WORK - PATIENT PORTAL LINK FT
You can access the FollowMyHealth Patient Portal offered by [facility] by registering at the following website: [email or web address]. By joining [employer]’s FollowMyHealth portal, you will also be able to view your health information using other applications (apps) compatible with our system.

## 2021-12-30 NOTE — DISCHARGE NOTE PROVIDER - DETAILS OF MALNUTRITION DIAGNOSIS/DIAGNOSES
This patient has been assessed with a concern for Malnutrition and was treated during this hospitalization for the following Nutrition diagnosis/diagnoses:     -  12/29/2021: Severe protein-calorie malnutrition   -  12/29/2021: Underweight (BMI < 19)

## 2021-12-30 NOTE — DISCHARGE NOTE PROVIDER - NSDCFUADDINST_GEN_ALL_CORE_FT
Stay home for 10 days due to contagion and danger to others if leaves the house. Other family members who are not vaccinated should be quarantined at home for total of 10-14 days from onset of patient symptoms

## 2021-12-30 NOTE — PROGRESS NOTE ADULT - SUBJECTIVE AND OBJECTIVE BOX
SUBJECTIVE:    CHIEF COMPLAINT:  Patient is a 89y old  Female who presents with a chief complaint of Cough (29 Dec 2021 17:01)      HPI:   89F PMH HTN, prior smoker with no formal dx of reactive airway dz but has home nebulizer machine, Complete heart block s/p PPM, presents to the ED with cough and sob for the  2 days PTA, stated sxs improved with nebs at home. Denied fevers/chills, chest pain, abd pain, n/v/d, orthopnea/leg swelling. Didn't take any antipyretics pta. Most history obtained form daughter Alvina [phone], states that pt recently got over fevers/chills/body aches/v/d, symptoms improved and then on 12/26 pt has been having cough/sob (denies new fevers/chills/body aches/v/d).   Pt has never had covid vaccine despite pleading by PMD with the patient and especially her daughter who is an avid "anti-vaxer" . She was advised of the dangerous nature of covid 19 and the risks to a 89 year old.      Active Problems  Abdominal pain, acute (789.00,338.19) (R10.9)  Acute left-sided low back pain without sciatica (724.2) (M54.5)  Aortic valve disorder (424.1) (I35.9)  CAD (coronary artery disease), native coronary artery (414.01) (I25.10)  Calculus of gallbladder with chronic cholecystitis with obstruction (574.11) (K80.11)  Cardiac pacemaker in situ (V45.01) (Z95.0)  Cholelithiasis (574.20) (K80.20)  Chronic depressive disorder (301.12) (F32.9)  Chronic kidney disease, stage 3b (585.3) (N18.32)  Closed fracture of shaft of left radius with ulna with routine healing, subsequent  encounter (V54.12) (S52.202D,S52.302D)  Common bile duct stone (574.50) (K80.50)  Complete heart block by electrocardiogram (426.0) (I44.2)  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)  BARNETT (dyspnea on exertion) (786.09) (R06.00)  Dysphagia, idiopathic (787.20) (R13.10)  Essential hypertension (401.9) (I10)  HUGO (generalized anxiety disorder) (300.02) (F41.1)  Gastric ulcer (531.90) (K25.9)  Hematochezia not due to hemorrhage from anus (578.1) (K92.1)  History of depression (V11.8) (Z86.59)  Hyperlipidemia (272.4) (E78.5)  Influenza vaccine administered (V04.81) (Z23)  Intertriginous candidiasis (112.3) (B37.2)  Left bundle branch block (LBBB) (426.3) (I44.7)  Left knee pain, unspecified chronicity (719.46) (M25.562)  Localized osteoarthritis of left knee (715.36) (M17.12)  Major depression, recurrent, chronic (296.30) (F33.9)  Mild dementia (294.20) (F03.90)  Other closed extra-articular fracture of distal end of left radius with nonunion, subsequent  encounter (733.82) (S52.552K)  Pain in both wrists (719.43) (M25.531,M25.532)  PVD (peripheral vascular disease) (443.9) (I73.9)  Radius and ulna distal fracture, left, closed, with nonunion, subsequent encounter  (733.82) (S52.502K,S52.602K)  Severe aortic stenosis (424.1) (I35.0)  Sick sinus syndrome (427.81) (I49.5)  Sundowning (F05)  Visual hallucinations (368.16) (R44.1)  Weight loss (783.21) (R63.4)  Wound infection (958.3) (T14.8XXA,L08.9)  Wound infection (958.3) (T14.8XXA,L08.9)  Wrist fracture, right (814.00) (S62.101A)    Past Medical History  History of Accidental fall, initial encounter (E888.9) (W19.XXXA)  History of Aspiration of vomitus (933.1) (T17.910A)  History of Bilateral edema of lower extremity (782.3) (R60.0)  CAD (coronary artery disease), native coronary artery (414.01) (I25.10)  History of Closed fracture of shaft of left radius and ulna, initial encounter (813.23)  (S52.202A,S52.302A)  Essential hypertension (401.9) (I10)  History of deep venous thrombosis (V12.51) (Z86.718)  History of depression (V11.8) (Z86.59)  History of non-ST elevation myocardial infarction (NSTEMI) (412) (I25.2)  History of pharyngitis (V12.69) (Z87.09)  History of shortness of breath (V13.89) (Z87.898)  Hyperlipidemia (272.4) (E78.5)  History of Pain of thigh, unspecified laterality (729.5) (M79.659)  History of Preop cardiovascular exam (V72.81) (Z01.810)     (28 Dec 2021 20:42)      Interval HPI and Overnight Events: Pt doing well. O2 sats mid to high 90's on room air    REVIEW OF SYSTEMS:  CONSTITUTIONAL: No weakness, fevers or chills  EYES/ENT: No visual changes;  No vertigo or throat pain   NECK: No pain or stiffness  RESPIRATORY: No  wheezing, hemoptysis; No shortness of breath, cough improved  CARDIOVASCULAR: No chest pain or palpitations  GASTROINTESTINAL: No abdominal or epigastric pain. No nausea, vomiting, or hematemesis; No diarrhea or constipation. No melena or hematochezia.  GENITOURINARY: No dysuria, frequency or hematuria  NEUROLOGICAL: No numbness or weakness  SKIN: No itching, burning, rashes, or lesions   All other review of systems is negative unless indicated above    OBJECTIVE    Vital Signs Last 24 Hrs  T(C): 36.4 (30 Dec 2021 08:29), Max: 36.9 (29 Dec 2021 15:16)  T(F): 97.6 (30 Dec 2021 08:29), Max: 98.5 (29 Dec 2021 15:16)  HR: 62 (30 Dec 2021 08:29) (62 - 83)  BP: 118/49 (30 Dec 2021 08:29) (118/49 - 144/70)  BP(mean): --  RR: 18 (30 Dec 2021 08:29) (18 - 18)  SpO2: 92% (30 Dec 2021 08:29) (92% - 97%)    MEDICATIONS  (STANDING):  amLODIPine   Tablet 5 milliGRAM(s) Oral daily  ARIPiprazole 2 milliGRAM(s) Oral daily  atorvastatin 10 milliGRAM(s) Oral at bedtime  busPIRone 15 milliGRAM(s) Oral two times a day  dexAMETHasone     Tablet 6 milliGRAM(s) Oral daily  enoxaparin Injectable 40 milliGRAM(s) SubCutaneous daily  metoprolol tartrate 25 milliGRAM(s) Oral daily  mirtazapine 45 milliGRAM(s) Oral at bedtime  pantoprazole    Tablet 40 milliGRAM(s) Oral before breakfast      LABS:                         12.2   7.98  )-----------( 208      ( 28 Dec 2021 16:51 )             36.4     12-28    134<L>  |  99  |  26<H>  ----------------------------<  130<H>  3.7   |  27  |  1.45<H>    Ca    8.9      28 Dec 2021 16:51  Phos  3.5     12-28  Mg     2.1     12-28    TPro  7.0  /  Alb  3.3  /  TBili  0.5  /  DBili  x   /  AST  22  /  ALT  16  /  AlkPhos  67  12-28      
SUBJECTIVE:    CHIEF COMPLAINT:  Patient is a 89y old  Female who presents with a chief complaint of Cough (29 Dec 2021 12:30)      HPI:   89F PMH HTN, prior smoker with no formal dx of reactive airway dz but has home nebulizer machine, Complete heart block s/p PPM, presents to the ED with cough and sob for the  2 days PTA, stated sxs improved with nebs at home. Denied fevers/chills, chest pain, abd pain, n/v/d, orthopnea/leg swelling. Didn't take any antipyretics pta. Most history obtained form daughter Alvina [phone], states that pt recently got over fevers/chills/body aches/v/d, symptoms improved and then on 12/26 pt has been having cough/sob (denies new fevers/chills/body aches/v/d).   Pt has never had covid vaccine despite pleading by PMD with the patient and especially her daughter who is an avid "anti-vaxer" . She was advised of the dangerous nature of covid 19 and the risks to a 89 year old.      Active Problems  Abdominal pain, acute (789.00,338.19) (R10.9)  Acute left-sided low back pain without sciatica (724.2) (M54.5)  Aortic valve disorder (424.1) (I35.9)  CAD (coronary artery disease), native coronary artery (414.01) (I25.10)  Calculus of gallbladder with chronic cholecystitis with obstruction (574.11) (K80.11)  Cardiac pacemaker in situ (V45.01) (Z95.0)  Cholelithiasis (574.20) (K80.20)  Chronic depressive disorder (301.12) (F32.9)  Chronic kidney disease, stage 3b (585.3) (N18.32)  Closed fracture of shaft of left radius with ulna with routine healing, subsequent  encounter (V54.12) (S52.202D,S52.302D)  Common bile duct stone (574.50) (K80.50)  Complete heart block by electrocardiogram (426.0) (I44.2)  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)  BARNETT (dyspnea on exertion) (786.09) (R06.00)  Dysphagia, idiopathic (787.20) (R13.10)  Essential hypertension (401.9) (I10)  HUGO (generalized anxiety disorder) (300.02) (F41.1)  Gastric ulcer (531.90) (K25.9)  Hematochezia not due to hemorrhage from anus (578.1) (K92.1)  History of depression (V11.8) (Z86.59)  Hyperlipidemia (272.4) (E78.5)  Influenza vaccine administered (V04.81) (Z23)  Intertriginous candidiasis (112.3) (B37.2)  Left bundle branch block (LBBB) (426.3) (I44.7)  Left knee pain, unspecified chronicity (719.46) (M25.562)  Localized osteoarthritis of left knee (715.36) (M17.12)  Major depression, recurrent, chronic (296.30) (F33.9)  Mild dementia (294.20) (F03.90)  Other closed extra-articular fracture of distal end of left radius with nonunion, subsequent  encounter (733.82) (S52.552K)  Pain in both wrists (719.43) (M25.531,M25.532)  PVD (peripheral vascular disease) (443.9) (I73.9)  Radius and ulna distal fracture, left, closed, with nonunion, subsequent encounter  (733.82) (S52.502K,S52.602K)  Severe aortic stenosis (424.1) (I35.0)  Sick sinus syndrome (427.81) (I49.5)  Sundowning (F05)  Visual hallucinations (368.16) (R44.1)  Weight loss (783.21) (R63.4)  Wound infection (958.3) (T14.8XXA,L08.9)  Wound infection (958.3) (T14.8XXA,L08.9)  Wrist fracture, right (814.00) (S62.101A)    Past Medical History  History of Accidental fall, initial encounter (E888.9) (W19.XXXA)  History of Aspiration of vomitus (933.1) (T17.910A)  History of Bilateral edema of lower extremity (782.3) (R60.0)  CAD (coronary artery disease), native coronary artery (414.01) (I25.10)  History of Closed fracture of shaft of left radius and ulna, initial encounter (813.23)  (S52.202A,S52.302A)  Essential hypertension (401.9) (I10)  History of deep venous thrombosis (V12.51) (Z86.718)  History of depression (V11.8) (Z86.59)  History of non-ST elevation myocardial infarction (NSTEMI) (412) (I25.2)  History of pharyngitis (V12.69) (Z87.09)  History of shortness of breath (V13.89) (Z87.898)  Hyperlipidemia (272.4) (E78.5)  History of Pain of thigh, unspecified laterality (729.5) (M79.659)  History of Preop cardiovascular exam (V72.81) (Z01.810)    Interval HPI and Overnight Events: No overnight events. O2 sat  on room air 89-90 but increases to 96 with cough or deep breath    REVIEW OF SYSTEMS:  CONSTITUTIONAL: No weakness, fevers or chills  EYES/ENT: No visual changes;  No vertigo or throat pain   NECK: No pain or stiffness  RESPIRATORY: No cough, wheezing, hemoptysis; No shortness of breath  CARDIOVASCULAR: No chest pain or palpitations  GASTROINTESTINAL: No abdominal or epigastric pain. No nausea, vomiting, or hematemesis; No diarrhea or constipation. No melena or hematochezia.  GENITOURINARY: No dysuria, frequency or hematuria  NEUROLOGICAL: No numbness or weakness  SKIN: No itching, burning, rashes, or lesions   All other review of systems is negative unless indicated above    OBJECTIVE    Vital Signs Last 24 Hrs  T(C): 36.9 (29 Dec 2021 15:16), Max: 37.4 (28 Dec 2021 17:08)  T(F): 98.5 (29 Dec 2021 15:16), Max: 99.4 (28 Dec 2021 17:08)  HR: 64 (29 Dec 2021 15:16) (64 - 85)  BP: 120/62 (29 Dec 2021 15:16) (115/50 - 154/65)  BP(mean): 90 (28 Dec 2021 20:54) (90 - 90)  RR: 18 (29 Dec 2021 15:16) (17 - 18)  SpO2: 96% (29 Dec 2021 15:16) (94% - 100%)    MEDICATIONS  (STANDING):  amLODIPine   Tablet 5 milliGRAM(s) Oral daily  ARIPiprazole 2 milliGRAM(s) Oral daily  atorvastatin 10 milliGRAM(s) Oral at bedtime  busPIRone 15 milliGRAM(s) Oral two times a day  dexAMETHasone     Tablet 6 milliGRAM(s) Oral daily  enoxaparin Injectable 40 milliGRAM(s) SubCutaneous daily  metoprolol tartrate 25 milliGRAM(s) Oral daily  mirtazapine 45 milliGRAM(s) Oral at bedtime  pantoprazole    Tablet 40 milliGRAM(s) Oral before breakfast      LABS:                         12.2   7.98  )-----------( 208      ( 28 Dec 2021 16:51 )             36.4     12-28    134<L>  |  99  |  26<H>  ----------------------------<  130<H>  3.7   |  27  |  1.45<H>    Ca    8.9      28 Dec 2021 16:51  Phos  3.5     12-28  Mg     2.1     12-28    TPro  7.0  /  Alb  3.3  /  TBili  0.5  /  DBili  x   /  AST  22  /  ALT  16  /  AlkPhos  67  12-28

## 2021-12-30 NOTE — DISCHARGE NOTE PROVIDER - CARE PROVIDER_API CALL
Luis Felipe Palmer)  Family Medicine  [address], [address], [place] [address]  Phone: [phone]  Fax: [phone]  Follow Up Time:

## 2021-12-30 NOTE — DISCHARGE NOTE NURSING/CASE MANAGEMENT/SOCIAL WORK - NSSCCONTNUM_GEN_ALL_CORE
[phone]

## 2021-12-30 NOTE — PROGRESS NOTE ADULT - ASSESSMENT
8F PMH HTN, prior smoker,  Complete heart block s/p PPM, Depression presents for URI and COVID 19    Assessment  Acute Covid 19 URI  Depression  Mild Hypoxia  CAD  CHB with pacemaker  Hyperlipidemia  Mild Dementia  Osteoporosis  Stage 4 Renal Disease based on Cr and age    Plan:  Continue medical bed  Respiratory isolation  Continue abilify, mirtazapine, buspar  Continue Metoprolol and amlodipine  Continue atorvastatin  O2 via NC prn  Follow O2 sats  IV Steroids  Hold remdesivir based on eGFR  ID Consult if decompensates  
 8F PMH HTN, prior smoker,  Complete heart block s/p PPM, Depression presents for URI and COVID 19    Assessment  Acute Covid 19 URI  Depression  Mild Hypoxia resolved  CAD  CHB with pacemaker  Hyperlipidemia  Mild Dementia  Osteoporosis  Stage 4 Renal Disease based on Cr and age    Plan:  Continue Respiratory isolation  Continue abilify, mirtazapine, buspar  Continue Metoprolol and amlodipine  Continue atorvastatin  Off O2   changed to PO Steroids  Anticipate discharge home today if home care in place

## 2021-12-30 NOTE — DISCHARGE NOTE PROVIDER - NSDCMRMEDTOKEN_GEN_ALL_CORE_FT
Abilify 2 mg oral tablet: 1 tab(s) orally once a day  acetaminophen 325 mg oral tablet: 2 tab(s) orally every 4 hours, As needed, Temp greater or equal to 38C (100.4F), Mild Pain (1 - 3), Moderate Pain (4 - 6)  albuterol 90 mcg/inh inhalation aerosol: 2 puff(s) inhaled every 4 hours, As needed, Shortness of Breath and/or Wheezing  alendronate 70 mg oral tablet: 1 tab(s) orally once a week  amLODIPine 5 mg oral tablet: 1 tab(s) orally once a day  atorvastatin 10 mg oral tablet: 1 tab(s) orally once a day (at bedtime)  busPIRone 30 mg oral tablet: 1 tab(s) orally once a day (in the evening)  dexamethasone 6 mg oral tablet: 1 tab(s) orally once a day  guaifenesin-dextromethorphan 100 mg-10 mg/5 mL oral liquid: 10 milliliter(s) orally every 4 hours, As needed, Cough  Metoprolol Tartrate 25 mg oral tablet: 0.5 tab(s) orally once a day (in the evening)  mirtazapine 45 mg oral tablet: 1 tab(s) orally once a day (at bedtime)  pantoprazole 20 mg oral delayed release tablet: 1 tab(s) orally once a day

## 2021-12-30 NOTE — PROGRESS NOTE ADULT - NUTRITIONAL ASSESSMENT
This patient has been assessed with a concern for Malnutrition and has been determined to have a diagnosis/diagnoses of Severe protein-calorie malnutrition and Underweight (BMI < 19).    This patient is being managed with:   Diet Regular-  Low Sodium  Prosource Gelatein Plus     Qty per Day:  2  Supplement Feeding Modality:  Oral  Ensure Enlive Cans or Servings Per Day:  2       Frequency:  Two Times a day  Entered: Dec 29 2021  5:00PM    
This patient has been assessed with a concern for Malnutrition and has been determined to have a diagnosis/diagnoses of Severe protein-calorie malnutrition and Underweight (BMI < 19).    This patient is being managed with:   Diet Regular-  Low Sodium  Prosource Gelatein Plus     Qty per Day:  2  Supplement Feeding Modality:  Oral  Ensure Enlive Cans or Servings Per Day:  2       Frequency:  Two Times a day  Entered: Dec 29 2021  5:00PM

## 2021-12-30 NOTE — DISCHARGE NOTE PROVIDER - HOSPITAL COURSE
Pt admitted with covid 19 and mild hypoxia. Treated with O2 to start and dexamethasone. Not a candidate for remdesivir due to stage 4 renal Kidney disease. Treated with albuteral MDI. Condition improved. Weaned off O2. O2 sats remained in 90's and felt to be safe for discharge back home with home care. Daughter agreeable and will  patient.

## 2022-01-01 ENCOUNTER — NON-APPOINTMENT (OUTPATIENT)
Age: 87
End: 2022-01-01

## 2022-01-01 ENCOUNTER — APPOINTMENT (OUTPATIENT)
Dept: ELECTROPHYSIOLOGY | Facility: CLINIC | Age: 87
End: 2022-01-01
Payer: MEDICARE

## 2022-01-01 ENCOUNTER — APPOINTMENT (OUTPATIENT)
Dept: ELECTROPHYSIOLOGY | Facility: CLINIC | Age: 87
End: 2022-01-01

## 2022-01-01 ENCOUNTER — APPOINTMENT (OUTPATIENT)
Dept: FAMILY MEDICINE | Facility: CLINIC | Age: 87
End: 2022-01-01
Payer: MEDICARE

## 2022-01-01 ENCOUNTER — APPOINTMENT (OUTPATIENT)
Dept: FAMILY MEDICINE | Facility: CLINIC | Age: 87
End: 2022-01-01

## 2022-01-01 ENCOUNTER — RX RENEWAL (OUTPATIENT)
Age: 87
End: 2022-01-01

## 2022-01-01 ENCOUNTER — APPOINTMENT (OUTPATIENT)
Dept: UROLOGY | Facility: CLINIC | Age: 87
End: 2022-01-01

## 2022-01-01 VITALS — BODY MASS INDEX: 18.36 KG/M2 | WEIGHT: 94 LBS

## 2022-01-01 VITALS
SYSTOLIC BLOOD PRESSURE: 118 MMHG | BODY MASS INDEX: 17.18 KG/M2 | DIASTOLIC BLOOD PRESSURE: 60 MMHG | HEIGHT: 60 IN | WEIGHT: 87.5 LBS | TEMPERATURE: 97.2 F

## 2022-01-01 VITALS
HEIGHT: 60 IN | DIASTOLIC BLOOD PRESSURE: 80 MMHG | TEMPERATURE: 97.8 F | HEART RATE: 82 BPM | BODY MASS INDEX: 17.08 KG/M2 | WEIGHT: 87 LBS | SYSTOLIC BLOOD PRESSURE: 130 MMHG | OXYGEN SATURATION: 86 %

## 2022-01-01 VITALS
SYSTOLIC BLOOD PRESSURE: 126 MMHG | WEIGHT: 87 LBS | TEMPERATURE: 97.5 F | OXYGEN SATURATION: 96 % | HEART RATE: 72 BPM | DIASTOLIC BLOOD PRESSURE: 82 MMHG | BODY MASS INDEX: 16.99 KG/M2

## 2022-01-01 VITALS
DIASTOLIC BLOOD PRESSURE: 51 MMHG | BODY MASS INDEX: 19.63 KG/M2 | SYSTOLIC BLOOD PRESSURE: 83 MMHG | OXYGEN SATURATION: 100 % | TEMPERATURE: 97.2 F | HEIGHT: 60 IN | WEIGHT: 100 LBS | HEART RATE: 81 BPM

## 2022-01-01 DIAGNOSIS — H60.90 UNSPECIFIED OTITIS EXTERNA, UNSPECIFIED EAR: ICD-10-CM
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
## 2022-01-01 DIAGNOSIS — I10 ESSENTIAL (PRIMARY) HYPERTENSION: ICD-10-CM

## 2022-01-01 DIAGNOSIS — L08.9 PERSONAL HISTORY OF OTHER INFECTIOUS AND PARASITIC DISEASES: ICD-10-CM

## 2022-01-01 DIAGNOSIS — S52.302D UNSPECIFIED FRACTURE OF SHAFT OF LEFT ULNA, SUBSEQUENT ENCOUNTER FOR CLOSED FRACTURE WITH ROUTINE HEALING: ICD-10-CM

## 2022-01-01 DIAGNOSIS — S52.202D UNSPECIFIED FRACTURE OF SHAFT OF LEFT ULNA, SUBSEQUENT ENCOUNTER FOR CLOSED FRACTURE WITH ROUTINE HEALING: ICD-10-CM

## 2022-01-01 DIAGNOSIS — M54.50 LOW BACK PAIN, UNSPECIFIED: ICD-10-CM

## 2022-01-01 DIAGNOSIS — Z86.19 PERSONAL HISTORY OF OTHER INFECTIOUS AND PARASITIC DISEASES: ICD-10-CM

## 2022-01-01 DIAGNOSIS — S62.101A FRACTURE OF UNSPECIFIED CARPAL BONE, RIGHT WRIST, INITIAL ENCOUNTER FOR CLOSED FRACTURE: ICD-10-CM

## 2022-01-01 DIAGNOSIS — H60.312 DIFFUSE OTITIS EXTERNA, LEFT EAR: ICD-10-CM

## 2022-01-01 DIAGNOSIS — R63.4 ABNORMAL WEIGHT LOSS: ICD-10-CM

## 2022-01-01 DIAGNOSIS — L89.323 PRESSURE ULCER OF LEFT BUTTOCK, STAGE 3: Chronic | ICD-10-CM

## 2022-01-01 DIAGNOSIS — F32.A DEPRESSION, UNSPECIFIED: ICD-10-CM

## 2022-01-01 DIAGNOSIS — M25.531 PAIN IN RIGHT WRIST: ICD-10-CM

## 2022-01-01 DIAGNOSIS — S52.552K: ICD-10-CM

## 2022-01-01 DIAGNOSIS — M25.532 PAIN IN RIGHT WRIST: ICD-10-CM

## 2022-01-01 DIAGNOSIS — Z00.00 ENCOUNTER FOR GENERAL ADULT MEDICAL EXAMINATION W/OUT ABNORMAL FINDINGS: ICD-10-CM

## 2022-01-01 LAB
25(OH)D3 SERPL-MCNC: 41.8 NG/ML
ALBUMIN SERPL ELPH-MCNC: 3.9 G/DL
ALBUMIN SERPL ELPH-MCNC: 4.2 G/DL
ALP BLD-CCNC: 70 U/L
ALP BLD-CCNC: 97 U/L
ALT SERPL-CCNC: 13 U/L
ALT SERPL-CCNC: 13 U/L
ANION GAP SERPL CALC-SCNC: 14 MMOL/L
ANION GAP SERPL CALC-SCNC: 15 MMOL/L
AST SERPL-CCNC: 16 U/L
AST SERPL-CCNC: 18 U/L
BASOPHILS # BLD AUTO: 0.07 K/UL
BASOPHILS # BLD AUTO: 0.08 K/UL
BASOPHILS NFR BLD AUTO: 1.1 %
BASOPHILS NFR BLD AUTO: 1.3 %
BILIRUB SERPL-MCNC: 0.2 MG/DL
BILIRUB SERPL-MCNC: 0.3 MG/DL
BILIRUB UR QL STRIP: NORMAL
BUN SERPL-MCNC: 27 MG/DL
BUN SERPL-MCNC: 38 MG/DL
CALCIUM SERPL-MCNC: 9.2 MG/DL
CALCIUM SERPL-MCNC: 9.3 MG/DL
CHLORIDE SERPL-SCNC: 101 MMOL/L
CHLORIDE SERPL-SCNC: 102 MMOL/L
CHOLEST SERPL-MCNC: 120 MG/DL
CHOLEST SERPL-MCNC: 134 MG/DL
CK SERPL-CCNC: 51 U/L
CO2 SERPL-SCNC: 22 MMOL/L
CO2 SERPL-SCNC: 24 MMOL/L
CREAT SERPL-MCNC: 1.58 MG/DL
CREAT SERPL-MCNC: 1.94 MG/DL
EGFR: 23 ML/MIN/1.73M2
EGFR: 29 ML/MIN/1.73M2
EOSINOPHIL # BLD AUTO: 0.16 K/UL
EOSINOPHIL # BLD AUTO: 0.17 K/UL
EOSINOPHIL NFR BLD AUTO: 2.5 %
EOSINOPHIL NFR BLD AUTO: 2.7 %
GLUCOSE SERPL-MCNC: 108 MG/DL
GLUCOSE SERPL-MCNC: 80 MG/DL
GLUCOSE UR-MCNC: NORMAL
HCG UR QL: 0.2 EU/DL
HCT VFR BLD CALC: 36.1 %
HCT VFR BLD CALC: 36.6 %
HDLC SERPL-MCNC: 55 MG/DL
HDLC SERPL-MCNC: 63 MG/DL
HGB BLD-MCNC: 11.3 G/DL
HGB BLD-MCNC: 11.7 G/DL
HGB UR QL STRIP.AUTO: NORMAL
IMM GRANULOCYTES NFR BLD AUTO: 0.3 %
IMM GRANULOCYTES NFR BLD AUTO: 0.3 %
KETONES UR-MCNC: ABNORMAL
LDLC SERPL CALC-MCNC: 48 MG/DL
LDLC SERPL CALC-MCNC: 49 MG/DL
LEUKOCYTE ESTERASE UR QL STRIP: ABNORMAL
LYMPHOCYTES # BLD AUTO: 1.15 K/UL
LYMPHOCYTES # BLD AUTO: 1.22 K/UL
LYMPHOCYTES NFR BLD AUTO: 18.2 %
LYMPHOCYTES NFR BLD AUTO: 19.2 %
MAN DIFF?: NORMAL
MAN DIFF?: NORMAL
MCHC RBC-ENTMCNC: 30.3 PG
MCHC RBC-ENTMCNC: 31 PG
MCHC RBC-ENTMCNC: 31.3 GM/DL
MCHC RBC-ENTMCNC: 32 GM/DL
MCV RBC AUTO: 94.8 FL
MCV RBC AUTO: 98.9 FL
MONOCYTES # BLD AUTO: 0.51 K/UL
MONOCYTES # BLD AUTO: 0.6 K/UL
MONOCYTES NFR BLD AUTO: 8 %
MONOCYTES NFR BLD AUTO: 9.5 %
NEUTROPHILS # BLD AUTO: 4.3 K/UL
NEUTROPHILS # BLD AUTO: 4.37 K/UL
NEUTROPHILS NFR BLD AUTO: 68.2 %
NEUTROPHILS NFR BLD AUTO: 68.7 %
NITRITE UR QL STRIP: NORMAL
NONHDLC SERPL-MCNC: 64 MG/DL
NONHDLC SERPL-MCNC: 71 MG/DL
PH UR STRIP: 7
PLATELET # BLD AUTO: 244 K/UL
PLATELET # BLD AUTO: 270 K/UL
POTASSIUM SERPL-SCNC: 4.7 MMOL/L
POTASSIUM SERPL-SCNC: 4.7 MMOL/L
PROT SERPL-MCNC: 6.5 G/DL
PROT SERPL-MCNC: 6.9 G/DL
PROT UR STRIP-MCNC: NORMAL
RBC # BLD: 3.65 M/UL
RBC # BLD: 3.86 M/UL
RBC # FLD: 13.3 %
RBC # FLD: 13.4 %
SODIUM SERPL-SCNC: 136 MMOL/L
SODIUM SERPL-SCNC: 141 MMOL/L
SP GR UR STRIP: 1.01
T3FREE SERPL-MCNC: 2.39 PG/ML
T4 FREE SERPL-MCNC: 1.3 NG/DL
TRIGL SERPL-MCNC: 108 MG/DL
TRIGL SERPL-MCNC: 80 MG/DL
TSH SERPL-ACNC: 2.02 UIU/ML
WBC # FLD AUTO: 6.31 K/UL
WBC # FLD AUTO: 6.36 K/UL

## 2022-01-01 PROCEDURE — 99213 OFFICE O/P EST LOW 20 MIN: CPT

## 2022-01-01 PROCEDURE — 36415 COLL VENOUS BLD VENIPUNCTURE: CPT

## 2022-01-01 PROCEDURE — 93294 REM INTERROG EVL PM/LDLS PM: CPT

## 2022-01-01 PROCEDURE — 99214 OFFICE O/P EST MOD 30 MIN: CPT | Mod: 25

## 2022-01-01 PROCEDURE — 93296 REM INTERROG EVL PM/IDS: CPT

## 2022-01-01 PROCEDURE — 93000 ELECTROCARDIOGRAM COMPLETE: CPT

## 2022-01-01 PROCEDURE — 81003 URINALYSIS AUTO W/O SCOPE: CPT | Mod: QW

## 2022-01-01 PROCEDURE — G0439: CPT

## 2022-01-01 RX ORDER — ALENDRONATE SODIUM 70 MG/1
70 TABLET ORAL
Qty: 12 | Refills: 0 | Status: ACTIVE | COMMUNITY
Start: 2018-05-09 | End: 1900-01-01

## 2022-01-01 RX ORDER — DOXYCYCLINE HYCLATE 100 MG/1
100 CAPSULE ORAL TWICE DAILY
Qty: 14 | Refills: 0 | Status: DISCONTINUED | COMMUNITY
Start: 2021-04-23 | End: 2022-01-01

## 2022-01-01 RX ORDER — NEOMYCIN SULFATE, POLYMYXIN B SULFATE, HYDROCORTISONE 3.5; 10000; 1 MG/ML; [USP'U]/ML; MG/ML
1 SOLUTION/ DROPS AURICULAR (OTIC) 4 TIMES DAILY
Qty: 1 | Refills: 0 | Status: ACTIVE | COMMUNITY
Start: 2022-01-01 | End: 1900-01-01

## 2022-01-01 RX ORDER — QUETIAPINE FUMARATE 25 MG/1
25 TABLET ORAL
Qty: 90 | Refills: 0 | Status: DISCONTINUED | COMMUNITY
Start: 2022-04-05

## 2022-01-04 ENCOUNTER — NON-APPOINTMENT (OUTPATIENT)
Age: 87
End: 2022-01-04

## 2022-01-10 DIAGNOSIS — U07.1 COVID-19: ICD-10-CM

## 2022-01-10 DIAGNOSIS — E78.5 HYPERLIPIDEMIA, UNSPECIFIED: ICD-10-CM

## 2022-01-10 DIAGNOSIS — I25.10 ATHEROSCLEROTIC HEART DISEASE OF NATIVE CORONARY ARTERY WITHOUT ANGINA PECTORIS: ICD-10-CM

## 2022-01-10 DIAGNOSIS — M81.0 AGE-RELATED OSTEOPOROSIS WITHOUT CURRENT PATHOLOGICAL FRACTURE: ICD-10-CM

## 2022-01-10 DIAGNOSIS — Z87.891 PERSONAL HISTORY OF NICOTINE DEPENDENCE: ICD-10-CM

## 2022-01-10 DIAGNOSIS — E43 UNSPECIFIED SEVERE PROTEIN-CALORIE MALNUTRITION: ICD-10-CM

## 2022-01-10 DIAGNOSIS — Z95.0 PRESENCE OF CARDIAC PACEMAKER: ICD-10-CM

## 2022-01-10 DIAGNOSIS — I48.0 PAROXYSMAL ATRIAL FIBRILLATION: ICD-10-CM

## 2022-01-10 DIAGNOSIS — M19.90 UNSPECIFIED OSTEOARTHRITIS, UNSPECIFIED SITE: ICD-10-CM

## 2022-01-10 DIAGNOSIS — R09.02 HYPOXEMIA: ICD-10-CM

## 2022-01-10 DIAGNOSIS — F03.90 UNSPECIFIED DEMENTIA WITHOUT BEHAVIORAL DISTURBANCE: ICD-10-CM

## 2022-01-10 DIAGNOSIS — I25.2 OLD MYOCARDIAL INFARCTION: ICD-10-CM

## 2022-01-10 DIAGNOSIS — Z86.718 PERSONAL HISTORY OF OTHER VENOUS THROMBOSIS AND EMBOLISM: ICD-10-CM

## 2022-01-10 DIAGNOSIS — N18.4 CHRONIC KIDNEY DISEASE, STAGE 4 (SEVERE): ICD-10-CM

## 2022-01-10 DIAGNOSIS — I13.10 HYPERTENSIVE HEART AND CHRONIC KIDNEY DISEASE WITHOUT HEART FAILURE, WITH STAGE 1 THROUGH STAGE 4 CHRONIC KIDNEY DISEASE, OR UNSPECIFIED CHRONIC KIDNEY DISEASE: ICD-10-CM

## 2022-01-10 DIAGNOSIS — F32.A DEPRESSION, UNSPECIFIED: ICD-10-CM

## 2022-01-12 ENCOUNTER — APPOINTMENT (OUTPATIENT)
Dept: FAMILY MEDICINE | Facility: CLINIC | Age: 87
End: 2022-01-12
Payer: MEDICARE

## 2022-01-12 VITALS
HEART RATE: 86 BPM | WEIGHT: 8 LBS | OXYGEN SATURATION: 92 % | TEMPERATURE: 95.3 F | HEIGHT: 60 IN | DIASTOLIC BLOOD PRESSURE: 58 MMHG | BODY MASS INDEX: 1.57 KG/M2 | SYSTOLIC BLOOD PRESSURE: 146 MMHG

## 2022-01-12 PROCEDURE — 99495 TRANSJ CARE MGMT MOD F2F 14D: CPT | Mod: CS

## 2022-01-13 NOTE — PHYSICAL EXAM
[Normal] : normal rate, regular rhythm, normal S1 and S2 and no murmur heard [61351 - Moderate Complexity requires multiple possible diagnoses and/or the management options, moderate complexity of the medical data (tests, etc.) to be reviewed, and moderate risk of significant complications, morbidity, and/or mortality as well as co] : Moderate Complexity

## 2022-01-13 NOTE — PLAN
[FreeTextEntry1] : Continue conservative measures\par OTC meds\par Cough Rx\par Fluids\par Follow O2 sat at home\par F/u if not improved

## 2022-01-13 NOTE — HISTORY OF PRESENT ILLNESS
[Post-hospitalization from ___ Hospital] : Post-hospitalization from [unfilled] Hospital [Admitted on: ___] : The patient was admitted on [unfilled] [Discharged on ___] : discharged on [unfilled] [Discharge Summary] : discharge summary [Pertinent Labs] : pertinent labs [FreeTextEntry2] : Pt. admitted to [facility] for COVID 19 infection. Mild hypoxia treated with N/C O2. Supportive care and albuterol MDI prn\par Condition improved and discharged home. States cough has persisted but is better than when she was in the hospital.  No one in the family has been vaccinated. Son in another hospital on a ventilator.

## 2022-01-13 NOTE — PHYSICAL EXAM
[Normal] : normal rate, regular rhythm, normal S1 and S2 and no murmur heard [97745 - Moderate Complexity requires multiple possible diagnoses and/or the management options, moderate complexity of the medical data (tests, etc.) to be reviewed, and moderate risk of significant complications, morbidity, and/or mortality as well as co] : Moderate Complexity

## 2022-01-13 NOTE — HISTORY OF PRESENT ILLNESS
[Post-hospitalization from ___ Hospital] : Post-hospitalization from [unfilled] Hospital [Admitted on: ___] : The patient was admitted on [unfilled] [Discharged on ___] : discharged on [unfilled] [Discharge Summary] : discharge summary [Pertinent Labs] : pertinent labs [FreeTextEntry2] : Pt. admitted to [facility] for COVID 19 infection. Mild hypoxia treated with N/C O2. Supportive care and albuterol MDI prn\par Condition improved and discharged home. States cough has persisted but is better than when she was in the hospital.  No one in the family has been vaccinated. Son in another hospital on a ventilator.

## 2022-01-24 ENCOUNTER — APPOINTMENT (OUTPATIENT)
Dept: FAMILY MEDICINE | Facility: CLINIC | Age: 87
End: 2022-01-24

## 2022-01-25 ENCOUNTER — APPOINTMENT (OUTPATIENT)
Dept: FAMILY MEDICINE | Facility: CLINIC | Age: 87
End: 2022-01-25
Payer: MEDICARE

## 2022-01-25 ENCOUNTER — APPOINTMENT (OUTPATIENT)
Dept: RADIOLOGY | Facility: CLINIC | Age: 87
End: 2022-01-25
Payer: MEDICARE

## 2022-01-25 ENCOUNTER — OUTPATIENT (OUTPATIENT)
Dept: OUTPATIENT SERVICES | Facility: HOSPITAL | Age: 87
LOS: 1 days | End: 2022-01-25
Payer: MEDICARE

## 2022-01-25 VITALS
BODY MASS INDEX: 19.63 KG/M2 | DIASTOLIC BLOOD PRESSURE: 58 MMHG | SYSTOLIC BLOOD PRESSURE: 113 MMHG | TEMPERATURE: 97.1 F | WEIGHT: 100 LBS | OXYGEN SATURATION: 83 % | HEIGHT: 60 IN | HEART RATE: 90 BPM

## 2022-01-25 DIAGNOSIS — S69.90XA UNSPECIFIED INJURY OF UNSPECIFIED WRIST, HAND AND FINGER(S), INITIAL ENCOUNTER: Chronic | ICD-10-CM

## 2022-01-25 DIAGNOSIS — Z98.890 OTHER SPECIFIED POSTPROCEDURAL STATES: Chronic | ICD-10-CM
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
## 2022-01-25 DIAGNOSIS — Z96.643 PRESENCE OF ARTIFICIAL HIP JOINT, BILATERAL: Chronic | ICD-10-CM

## 2022-01-25 PROCEDURE — 99213 OFFICE O/P EST LOW 20 MIN: CPT

## 2022-01-25 PROCEDURE — 73502 X-RAY EXAM HIP UNI 2-3 VIEWS: CPT | Mod: 26,RT

## 2022-01-25 PROCEDURE — 73502 X-RAY EXAM HIP UNI 2-3 VIEWS: CPT

## 2022-01-30 ENCOUNTER — RX RENEWAL (OUTPATIENT)
Age: 87
End: 2022-01-30

## 2022-01-31 ENCOUNTER — APPOINTMENT (OUTPATIENT)
Dept: ELECTROPHYSIOLOGY | Facility: CLINIC | Age: 87
End: 2022-01-31
Payer: MEDICARE

## 2022-02-01 ENCOUNTER — NON-APPOINTMENT (OUTPATIENT)
Age: 87
End: 2022-02-01

## 2022-02-01 PROCEDURE — 93294 REM INTERROG EVL PM/LDLS PM: CPT

## 2022-02-01 PROCEDURE — 93296 REM INTERROG EVL PM/IDS: CPT

## 2022-02-01 NOTE — HISTORY OF PRESENT ILLNESS
[FreeTextEntry8] : Patient presenting with fall, brought in by daughter (caregiver). Fall was unwitnessed, patient notified daughter afterwards that she fell. No LOC, no AMS, no head strike as per patient. Admits to right hip/groin pain.

## 2022-02-03 ENCOUNTER — APPOINTMENT (OUTPATIENT)
Dept: FAMILY MEDICINE | Facility: CLINIC | Age: 87
End: 2022-02-03
Payer: MEDICARE

## 2022-02-03 DIAGNOSIS — S76.111A STRAIN OF RIGHT QUADRICEPS MUSCLE, FASCIA AND TENDON, INITIAL ENCOUNTER: ICD-10-CM

## 2022-02-03 PROCEDURE — 99213 OFFICE O/P EST LOW 20 MIN: CPT | Mod: 95

## 2022-02-03 NOTE — PROGRESS NOTE ADULT - SUBJECTIVE AND OBJECTIVE BOX
Patient arrives to ED with c/o \"super sharp\" intermittent migraine x1.5 week. Talking tylenol/ibuprofen with minimal relief. States she has been able to drive while having these migraines.     -numbness and tingling. Photophobia with headaches. 9/10.    Patient is a 89y old  Female who presents with a chief complaint of septic shock with bacteremia with cholangitis (27 Nov 2018 13:14)      SUBJECTIVE / OVERNIGHT EVENTS: No overnight events. Feels well, tolerating po diet, no abd pain, nausea, vomiting, cp, sob. Wants to go home.     MEDICATIONS  (STANDING):  amLODIPine   Tablet 5 milliGRAM(s) Oral daily  ARIPiprazole 5 milliGRAM(s) Oral daily  atorvastatin 10 milliGRAM(s) Oral at bedtime  heparin  Injectable 5000 Unit(s) SubCutaneous every 12 hours  levoFLOXacin IVPB      levoFLOXacin IVPB 500 milliGRAM(s) IV Intermittent every 24 hours  metoprolol tartrate 12.5 milliGRAM(s) Oral two times a day  mirtazapine 45 milliGRAM(s) Oral at bedtime  nortriptyline 25 milliGRAM(s) Oral daily    MEDICATIONS  (PRN):  acetaminophen   Tablet .. 650 milliGRAM(s) Oral every 6 hours PRN Temp greater or equal to 38C (100.4F)      Vital Signs Last 24 Hrs  T(C): 37 (28 Nov 2018 04:02), Max: 37.4 (27 Nov 2018 21:24)  T(F): 98.6 (28 Nov 2018 04:02), Max: 99.4 (27 Nov 2018 21:24)  HR: 90 (28 Nov 2018 04:02) (90 - 94)  BP: 148/72 (28 Nov 2018 04:02) (148/72 - 158/90)  BP(mean): --  RR: 18 (28 Nov 2018 04:02) (18 - 20)  SpO2: 98% (28 Nov 2018 04:02) (94% - 98%)  CAPILLARY BLOOD GLUCOSE        I&O's Summary    27 Nov 2018 07:01  -  28 Nov 2018 07:00  --------------------------------------------------------  IN: 560 mL / OUT: 1225 mL / NET: -665 mL    28 Nov 2018 07:01  -  28 Nov 2018 11:12  --------------------------------------------------------  IN: 220 mL / OUT: 0 mL / NET: 220 mL        \PHYSICAL EXAM:  GENERAL: NAD, well-developed  HEAD:  Atraumatic, Normocephalic  NECK: Supple, No JVD  CHEST/LUNG: Clear to auscultation bilaterally; No wheeze  HEART: Regular rate and rhythm; No murmurs, rubs, or gallops  ABDOMEN: Soft, Nontender, Nondistended; Bowel sounds present  EXTREMITIES:  2+ Peripheral Pulses, No clubbing, cyanosis, or edema  PSYCH: AAOx3  NEUROLOGY: non-focal  SKIN: No rashes or lesions      LABS:                        11.3   8.62  )-----------( 191      ( 28 Nov 2018 08:15 )             32.8     11-28    141  |  106  |  8   ----------------------------<  100<H>  3.3<L>   |  22  |  0.77    Ca    8.5      28 Nov 2018 06:18  Phos  3.7     11-27  Mg     1.9     11-27    TPro  5.8<L>  /  Alb  2.8<L>  /  TBili  0.3  /  DBili  x   /  AST  13  /  ALT  62<H>  /  AlkPhos  115  11-28    PT/INR - ( 27 Nov 2018 07:20 )   PT: 13.9 sec;   INR: 1.20 ratio         PTT - ( 27 Nov 2018 01:10 )  PTT:30.0 sec          Culture - Blood (collected 27 Nov 2018 09:26)  Source: .Blood Blood-Venous  Preliminary Report (28 Nov 2018 10:01):    No growth to date.    Culture - Blood (collected 27 Nov 2018 09:26)  Source: .Blood Blood-Peripheral  Preliminary Report (28 Nov 2018 10:01):    No growth to date.          RADIOLOGY & ADDITIONAL TESTS:    tele reviewed:  sinus 70-80s    Imaging Personally Reviewed:    Consultant(s) Notes Reviewed:  GI and ID     Care Discussed with Consultants/Other Providers:

## 2022-02-03 NOTE — HISTORY OF PRESENT ILLNESS
[Home] : at home, [unfilled] , at the time of the visit. [Medical Office: ([facility])___] : at the medical office located in  [Verbal consent obtained from patient] : the patient, [unfilled] [FreeTextEntry4] : Sherrie Snow [FreeTextEntry6] : Pt had fall.Xray of right hip negative. Seen by ortho. Still with some pain to thigh.\par Concerned that may have missed something\par also death in the family and pt with poor eating and drinking. Taking meds.

## 2022-02-03 NOTE — PHYSICAL EXAM
[Normal] : no acute distress, well nourished, well developed and well-appearing [No JVD] : no jugular venous distention [No Respiratory Distress] : no respiratory distress  [Soft] : abdomen soft [Non Tender] : non-tender [Grossly Normal Strength/Tone] : grossly normal strength/tone [de-identified] : FROM of right hip. Pain to right thigh on lwering to bed after daughet lifts leg. No pain on ROM of hip. pain to quad stress

## 2022-02-03 NOTE — PLAN
[FreeTextEntry1] : Rest\par Warm compresses\par Ice packs prn pain\par Tylenol\par Encouraged po intake\par Continue antidepressants.

## 2022-02-16 ENCOUNTER — APPOINTMENT (OUTPATIENT)
Dept: FAMILY MEDICINE | Facility: CLINIC | Age: 87
End: 2022-02-16
Payer: MEDICARE

## 2022-02-16 PROCEDURE — 99213 OFFICE O/P EST LOW 20 MIN: CPT | Mod: 95

## 2022-02-16 NOTE — PHYSICAL EXAM
[Normal] : no acute distress, well nourished, well developed and well-appearing [Normal Sclera/Conjunctiva] : normal sclera/conjunctiva [Normal Outer Ear/Nose] : the outer ears and nose were normal in appearance [No JVD] : no jugular venous distention [No Respiratory Distress] : no respiratory distress  [de-identified] : Visible pressure ulcer visible to central gluteal crease at region of the coccyx. aMild surounding erythema with

## 2022-02-16 NOTE — PLAN
[FreeTextEntry1] : Pt recommended to shift weight off decubitus. Need to use barrier. Frequent checks If not improving will need to see wound care specialist

## 2022-02-16 NOTE — HISTORY OF PRESENT ILLNESS
[Home] : at home, [unfilled] , at the time of the visit. [Medical Office: ([facility])___] : at the medical office located in  [Verbal consent obtained from patient] : the patient, [unfilled] [FreeTextEntry4] : Sherrie Snow [FreeTextEntry6] : C/o open sore to buttock

## 2022-03-23 ENCOUNTER — NON-APPOINTMENT (OUTPATIENT)
Age: 87
End: 2022-03-23

## 2022-03-28 ENCOUNTER — APPOINTMENT (OUTPATIENT)
Dept: FAMILY MEDICINE | Facility: CLINIC | Age: 87
End: 2022-03-28
Payer: MEDICARE

## 2022-03-28 PROCEDURE — 99213 OFFICE O/P EST LOW 20 MIN: CPT | Mod: 95

## 2022-03-28 NOTE — HISTORY OF PRESENT ILLNESS
[FreeTextEntry6] : Having increased waking at night, halucinations, agitation and wandering at night\par Trying to get extra care

## 2022-03-28 NOTE — PHYSICAL EXAM
[Normal Sclera/Conjunctiva] : normal sclera/conjunctiva [Normal Outer Ear/Nose] : the outer ears and nose were normal in appearance [No Respiratory Distress] : no respiratory distress  [Normal] : affect was normal and insight and judgment were intact [Normal Rate] : normal rate  [de-identified] : Unable to examine due to telehealth visit

## 2022-03-31 NOTE — ED PROVIDER NOTE - SCRIBE NAME
Erick Shah Xeljanz Counseling: I discussed with the patient the risks of Xeljanz therapy including increased risk of infection, liver issues, headache, diarrhea, or cold symptoms. Live vaccines should be avoided. They were instructed to call if they have any problems.

## 2022-04-04 ENCOUNTER — NON-APPOINTMENT (OUTPATIENT)
Age: 87
End: 2022-04-04

## 2022-05-02 PROBLEM — L89.323: Chronic | Status: ACTIVE | Noted: 2022-01-01

## 2022-05-02 PROBLEM — M25.531 PAIN IN BOTH WRISTS: Status: RESOLVED | Noted: 2020-11-27 | Resolved: 2022-01-01

## 2022-05-02 PROBLEM — Z86.19 HISTORY OF WOUND INFECTION: Status: RESOLVED | Noted: 2021-04-23 | Resolved: 2022-01-01

## 2022-05-02 PROBLEM — M54.50 ACUTE LEFT-SIDED LOW BACK PAIN WITHOUT SCIATICA: Status: RESOLVED | Noted: 2020-02-19 | Resolved: 2022-01-01

## 2022-05-02 PROBLEM — S52.202D CLOSED FRACTURE OF SHAFT OF LEFT RADIUS WITH ULNA WITH ROUTINE HEALING, SUBSEQUENT ENCOUNTER: Status: RESOLVED | Noted: 2018-03-16 | Resolved: 2022-01-01

## 2022-05-02 PROBLEM — S62.101A WRIST FRACTURE, RIGHT: Status: RESOLVED | Noted: 2020-11-27 | Resolved: 2022-01-01

## 2022-05-02 PROBLEM — S52.552K: Status: RESOLVED | Noted: 2021-02-17 | Resolved: 2022-01-01
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
## 2022-05-02 NOTE — HISTORY OF PRESENT ILLNESS
[FreeTextEntry8] : pt is here for blood work from Priya Scott\par Follow up on cardiac meds\par Follow up on depression\par Meds seem to be working

## 2022-05-13 NOTE — ED ADULT NURSE NOTE - PAIN RATING/NUMBER SCALE (0-10): ACTIVITY
"[facility] Call Center    Phone Message    May a detailed message be left on voicemail: yes     Reason for Call: Patient had surgery on 5/12/22 and would like to schedule an appointment to have stent removed.  Per patient, the string is there but \"I'm too squimish to remove string\".  Please reach out to patient.    Action Taken: Message routed to:  [facility] ([facility]): NIKIA    Travel Screening: Not Applicable                                                                      " 0

## 2022-07-13 NOTE — ED PROVIDER NOTE - CONSTITUTIONAL, MLM
[Fever] : no fever [Eye Pain] : no eye pain [Earache] : no earache [Chest Pain] : no chest pain [Shortness Of Breath] : no shortness of breath [Abdominal Pain] : no abdominal pain [Dysuria] : no dysuria [Skin Wound] : no skin wound [Joint Pain] : no joint pain [Insomnia] : no insomnia [Easy Bruising] : no tendency for easy bruising normal... Elderly white female, alert, no respiratory distress, normocephalic atraumatic.

## 2022-08-10 PROBLEM — R63.4 WEIGHT LOSS: Status: ACTIVE | Noted: 2020-06-18

## 2022-08-10 PROBLEM — F32.A CHRONIC DEPRESSIVE DISORDER: Status: ACTIVE | Noted: 2020-10-19

## 2022-08-10 NOTE — HEALTH RISK ASSESSMENT
[Never] : Never [0-4] : 0-4 [1 or 2 (0 pts)] : 1 or 2 (0 points) [Never (0 pts)] : Never (0 points) [No] : In the past 12 months have you used drugs other than those required for medical reasons? No [0] : 2) Feeling down, depressed, or hopeless: Not at all (0) [HIV test declined] : HIV test declined [Hepatitis C test declined] : Hepatitis C test declined [With Family] : lives with family [# of Members in Household ___] :  household currently consist of [unfilled] member(s) [Retired] : retired [High School] : high school [] :  [# Of Children ___] : has [unfilled] children [Feels Safe at Home] : Feels safe at home [Fully functional (bathing, dressing, toileting, transferring, walking, feeding)] : Fully functional (bathing, dressing, toileting, transferring, walking, feeding) [Fully functional (using the telephone, shopping, preparing meals, housekeeping, doing laundry, using] : Fully functional and needs no help or supervision to perform IADLs (using the telephone, shopping, preparing meals, housekeeping, doing laundry, using transportation, managing medications and managing finances) [Smoke Detector] : smoke detector [Carbon Monoxide Detector] : carbon monoxide detector [Seat Belt] :  uses seat belt [TB Exposure] : is being exposed to tuberculosis [Caregiver Concerns] : has caregiver concerns [Very Good] : ~his/her~  mood as very good [One fall no injury in past year] : Patient reported one fall in the past year without injury [PHQ-2 Negative - No further assessment needed] : PHQ-2 Negative - No further assessment needed [Audit-CScore] : 0 [de-identified] : pt fell on wrist a few weeks ago.  [RHA1Untyp] : 0 [Change in mental status noted] : No change in mental status noted [Language] : denies difficulty with language [Behavior] : denies difficulty with behavior [Learning/Retaining New Information] : denies difficulty learning/retaining new information [Handling Complex Tasks] : denies difficulty handling complex tasks [Reasoning] : denies difficulty with reasoning [Spatial Ability and Orientation] : denies difficulty with spatial ability and orientation [Sexually Active] : not sexually active [Reports changes in hearing] : Reports no changes in hearing [Reports changes in vision] : Reports no changes in vision [Reports normal functional visual acuity (ie: able to read med bottle)] : Reports poor functional visual acuity.  [Reports changes in dental health] : Reports no changes in dental health [Guns at Home] : no guns at home [Sunscreen] : does not use sunscreen [Travel to Developing Areas] : does not  travel to developing areas [Designated Healthcare Proxy] : Designated healthcare proxy [Relationship: ___] : Relationship: [unfilled] [DNR] : DNR [DNI] : DNI [AdvancecareDate] : 08/10 [FreeTextEntry4] : son and daughter are both health proxies.

## 2022-08-10 NOTE — HISTORY OF PRESENT ILLNESS
[Family Member] : family member [FreeTextEntry1] : pt is here for cpe. [de-identified] : Pt is here for cpe. Pt is accompanied by her daughter. She claims that patient has been forgetful over the last couple of years. She has trouble remembering some family members. Pt can use the bathroom herself. She got hearing aids put in 3 months ago. She has also had a fall a few weeks ago. Pt claims episodes of palpitations that last for less than a minute and are resolved by deep breathing. Pts daughter claims weight loss but not sure how much. Pt denies any fever, chills, chest pain, SOB, coughing, wheezing, N/V, abdominal pain.

## 2022-08-10 NOTE — PHYSICAL EXAM
[No Acute Distress] : no acute distress [Normal] : the outer ears and nose were normal in appearance and the oropharynx was normal [No Respiratory Distress] : no respiratory distress  [No Accessory Muscle Use] : no accessory muscle use [Normal Rate] : normal rate  [Soft] : abdomen soft [Non Tender] : non-tender [Non-distended] : non-distended [de-identified] : seems weak and difficult to pay attention.  [de-identified] : extraocular movements intact bilaterally.  [de-identified] : bronchial breath sounds on right upper lobes anteriorly and posteriorly.  [de-identified] : S [de-identified] : weak upper and lower extremity. Moderate joint stiffness.  [de-identified] : T [de-identified] : Forgetful but aware of setting.

## 2022-08-10 NOTE — REVIEW OF SYSTEMS
[Fever] : no fever [Chills] : no chills [Fatigue] : no fatigue [Chest Pain] : no chest pain [Orthopnea] : no orthopnea [Shortness Of Breath] : no shortness of breath [Wheezing] : no wheezing [Cough] : no cough [Abdominal Pain] : no abdominal pain [Nausea] : no nausea [Constipation] : no constipation [Diarrhea] : diarrhea [Vomiting] : no vomiting [Muscle Weakness] : no muscle weakness [Memory Loss] : memory loss [FreeTextEntry3] : denies any vision changes [FreeTextEntry5] : palpitations last for less than 1 minute [FreeTextEntry9] : w [de-identified] : forgetful of family members and setting.

## 2022-08-18 NOTE — ASU DISCHARGE PLAN (ADULT/PEDIATRIC). - C. MAKE IMPORTANT PERSONAL OR BUSINESS DECISIONS
Messaged Dr. Eliceo Lombardi , Dr. Fischer Files, Palliative care, and Dr Beth Lealasant the surgical residents about patient passing. Updated Dr. Marcelino  and he stated he would sign the death Certificate. Statement Selected

## 2022-10-19 NOTE — DIETITIAN INITIAL EVALUATION ADULT. - PHYSICAL ASSESSMENT SHOULDERS
severe Consent (Temporal Branch)/Introductory Paragraph: The rationale for Mohs was explained to the patient and consent was obtained. The risks, benefits and alternatives to therapy were discussed in detail. Specifically, the risks of damage to the temporal branch of the facial nerve, infection, scarring, bleeding, prolonged wound healing, incomplete removal, allergy to anesthesia, and recurrence were addressed. Prior to the procedure, the treatment site was clearly identified and confirmed by the patient. All components of Universal Protocol/PAUSE Rule completed.

## 2022-11-02 PROBLEM — H60.90 OTITIS EXTERNA: Status: ACTIVE | Noted: 2022-01-01

## 2022-11-02 PROBLEM — H60.312 ACUTE DIFFUSE OTITIS EXTERNA OF LEFT EAR: Status: ACTIVE | Noted: 2022-01-01

## 2022-11-06 NOTE — HISTORY OF PRESENT ILLNESS
[FreeTextEntry8] : KAREN ARREDONDO is a 89 year old female presenting with daughter who sates she noticed purulent drainage and odor from left ear.

## 2022-12-05 NOTE — ED PROVIDER NOTE - NSFOLLOWUPINSTRUCTIONS_ED_ALL_ED_FT
initiate/review safe skin-to-skin/initiate/review hand expression/initiate/review techniques for position and latch/initiate/review finger suck/reviewed components of an effective feeding and at least 8 effective feedings per day required/reviewed importance of monitoring infant diapers, the breastfeeding log, and minimum output each day/reviewed benefits and recommendations for rooming in/reviewed feeding on demand/by cue at least 8 times a day/recommended follow-up with pediatrician within 24 hours of discharge
Laceration    WHAT YOU NEED TO KNOW:    A laceration is an injury to the skin and the soft tissue underneath it. Lacerations happen when you are cut or hit by something. They can happen anywhere on the body.     DISCHARGE INSTRUCTIONS:    Return to the emergency department if:     You have heavy bleeding or bleeding that does not stop after 10 minutes of holding firm, direct pressure over the wound.       Your wound opens up.     Contact your healthcare provider if:     You have a fever or chills.       Your laceration is red, warm, or swollen.      You have red streaks on your skin coming from your wound.      You have white or yellow drainage from the wound that smells bad.      You have pain that gets worse, even after treatment.       You have questions or concerns about your condition or care.     Medicines:     Prescription pain medicine may be given. Ask how to take this medicine safely.       Antibiotics help treat or prevent a bacterial infection.       Take your medicine as directed. Contact your healthcare provider if you think your medicine is not helping or if you have side effects. Tell him or her if you are allergic to any medicine. Keep a list of the medicines, vitamins, and herbs you take. Include the amounts, and when and why you take them. Bring the list or the pill bottles to follow-up visits. Carry your medicine list with you in case of an emergency.    Care for your wound as directed:     Do not get your wound wet until your healthcare provider says it is okay. Do not soak your wound in water. Do not go swimming until your healthcare provider says it is okay. Carefully wash the wound with soap and water. Gently pat the area dry or allow it to air dry.       Change your bandages when they get wet, dirty, or after washing. Apply new, clean bandages as directed. Do not apply elastic bandages or tape too tight. Do not put powders or lotions over your incision.       Apply antibiotic ointment as directed. Your healthcare provider may give you antibiotic ointment to put over your wound if you have stitches. If you have strips of tape over your incision, let them dry up and fall off on their own. If they do not fall off within 14 days, gently remove them. If you have glue over your wound, do not remove or pick at it. If your glue comes off, do not replace it with glue that you have at home.       Check your wound every day for signs of infection such as swelling, redness, or pus.     Self-care:     Apply ice on your wound for 15 to 20 minutes every hour or as directed. Use an ice pack, or put crushed ice in a plastic bag. Cover it with a towel. Ice helps prevent tissue damage and decreases swelling and pain.      Use a splint as directed. A splint will decrease movement and stress on your wound. It may help it heal faster. A splint may be used for lacerations over joints or areas of your body that bend. Ask your healthcare provider how to apply and remove a splint.       Decrease scarring of your wound by applying ointments as directed. Do not apply ointments until your healthcare provider says it is okay. You may need to wait until your wound is healed. Ask which ointment to buy and how often to use it. After your wound is healed, use sunscreen over the area when you are out in the sun. You should do this for at least 6 months to 1 year after your injury.     Follow up with your healthcare provider as directed: You may need to follow up in 24 to 48 hours to have your wound checked for infection. You will need to return in 3 to 14 days if you have stitches or staples so they can be removed. Care for your wound as directed to prevent infection and help it heal. Write down your questions so you remember to ask them during your visits.     Head Injury, Adult  There are many types of head injuries. Head injuries can be as minor as a bump, or they can be more severe. More severe head injuries include:  A jarring injury to the brain (concussion).A bruise of the brain (contusion). This means there is bleeding in the brain that can cause swelling.A cracked skull (skull fracture).Bleeding in the brain that collects, clots, and forms a bump (hematoma).After a head injury, you may need to be observed for a while in the emergency department or urgent care. Sometimes admission to the hospital is needed.  After a head injury has happened, most problems occur within the first 24 hours, but side effects may occur up to 7–10 days after the injury. It is important to watch your condition for any changes.  What are the causes?  There are many possible causes of a head injury. A serious head injury may happen to someone who is in a car accident (motor vehicle collision). Other causes of major head injuries include bicycle or motorcycle accidents, sports injuries, and falls.  Risk factors  This condition is more likely to occur in people who:  Drink a lot of alcohol or use drugs.Are over the age of 65.Are at risk for falls.What are the symptoms?  There are many possible symptoms of a head injury. Visible symptoms of a head injury include a bruise, bump, or bleeding at the site of the injury. Other non-visible symptoms include:  Feeling sleepy or not being able to stay awake.Passing out.Headache.Seizures.Dizziness.Confusion.Memory problems.Nausea or vomiting.Other possible symptoms that may develop after the head injury include:  Poor attention and concentration.Fatigue or tiring easily.Irritability.Being uncomfortable around bright lights or loud noises.Anxiety or depression.Disturbed sleep.How is this diagnosed?  This condition can usually be diagnosed based on your symptoms, a description of the injury, and a physical exam. You may also have imaging tests done, such as a CT scan or MRI. You will also be closely watched.  How is this treated?  Treatment for this condition depends on the severity and type of injury you have. The main goal of treatment is to prevent complications and allow the brain time to heal.  For mild head injury, you may be sent home and treatment may include:  Observation. A responsible adult should stay with you for 24 hours after your injury and check on you often.Physical rest.Brain rest.Pain medicines.For severe brain injury, treatment may include:  Close observation. This includes hospitalization with frequent physical exams. You may need to go to a hospital that specializes in head injury.Pain medicines.Breathing support. This may include using a ventilator.Managing the pressure inside the brain (intracranial pressure, or ICP). This may include:  Monitoring the ICP.Giving medicines to decrease the ICP.Positioning you to decrease the ICP.Medicine to prevent seizures.Surgery to stop bleeding or to remove blood clots (craniotomy).Surgery to remove part of the skull (decompressive craniectomy). This allows room for the brain to swell.Follow these instructions at home:  Activity     Rest as much as possible and avoid activities that are physically hard or tiring.Make sure you get enough sleep.Limit activities that require a lot of thought or attention, such as:  Watching TV.Playing memory games and puzzles.Job-related work or homework.Working on the computer, social media, and texting.Avoid activities that could cause another head injury, such as playing sports, until your health care provider approves. Having another head injury, especially before the first one has healed, can be dangerous.Ask your health care provider when it is safe for you to return to your regular activities, including work or school. Ask your health care provider for a step-by-step plan for gradually returning to activities.Ask your health care provider when you can drive, ride a bicycle, or use heavy machinery. Your ability to react may be slower after a brain injury. Never do these activities if you are dizzy.Lifestyle     Do not drink alcohol until your health care provider approves, and avoid drug use. Alcohol and certain drugs may slow your recovery and can put you at risk of further injury.If it is harder than usual to remember things, write them down.If you are easily distracted, try to do one thing at a time.Talk with family members or close friends when making important decisions.Tell your friends, family, a trusted colleague, and  about your injury, symptoms, and restrictions. Have them watch for any new or worsening problems.General instructions     Take over-the-counter and prescription medicines only as told by your health care provider.Have someone stay with you for 24 hours after your head injury. This person should watch you for any changes in your symptoms and be ready to seek medical help, as needed.Keep all follow-up visits as told by your health care provider. This is important.How is this prevented?  Work on improving your balance and strength to avoid falls.Wear a seatbelt when you are in a moving vehicle.Wear a helmet when riding a bicycle, skiing, or doing any other sport or activity that has a risk of injury.Drink alcohol only in moderation.Take safety measures in your home, such as:  Removing clutter and tripping hazards from floors and stairways.Using grab bars in bathrooms and handrails by stairs.Placing non-slip mats on floors and in bathtubs.Improving lighting in dim areas.Get help right away if:  You have:  A severe headache that is not helped by medicine.Trouble walking, have weakness in your arms and legs, or lose your balance.Clear or bloody fluid coming from your nose or ears.Changes in your vision.A seizure.You vomit.Your symptoms get worse.Your speech is slurred.You pass out.You are sleepier and have trouble staying awake.Your pupils change size.These symptoms may represent a serious problem that is an emergency. Do not wait to see if the symptoms will go away. Get medical help right away. Call your local emergency services (911 in the [place] Do not drive yourself to the hospital.   This information is not intended to replace advice given to you by your health care provider. Make sure you discuss any questions you have with your health care provider.     FOLLOW UP WITH YOUR PRIMARY DOCTOR IN 1-2 DAYS. RETURN TO THE ER FOR ANY WORSENING SYMPTOMS OR NEW CONCERNS.

## 2023-01-01 ENCOUNTER — NON-APPOINTMENT (OUTPATIENT)
Age: 88
End: 2023-01-01

## 2023-01-01 ENCOUNTER — APPOINTMENT (OUTPATIENT)
Dept: ORTHOPEDIC SURGERY | Facility: CLINIC | Age: 88
End: 2023-01-01
Payer: MEDICARE

## 2023-01-01 ENCOUNTER — APPOINTMENT (OUTPATIENT)
Dept: FAMILY MEDICINE | Facility: CLINIC | Age: 88
End: 2023-01-01

## 2023-01-01 ENCOUNTER — APPOINTMENT (OUTPATIENT)
Dept: UROLOGY | Facility: CLINIC | Age: 88
End: 2023-01-01

## 2023-01-01 ENCOUNTER — APPOINTMENT (OUTPATIENT)
Dept: ELECTROPHYSIOLOGY | Facility: CLINIC | Age: 88
End: 2023-01-01
Payer: MEDICARE

## 2023-01-01 ENCOUNTER — APPOINTMENT (OUTPATIENT)
Dept: FAMILY MEDICINE | Facility: CLINIC | Age: 88
End: 2023-01-01
Payer: MEDICARE

## 2023-01-01 ENCOUNTER — EMERGENCY (EMERGENCY)
Facility: HOSPITAL | Age: 88
LOS: 0 days | Discharge: HOME | End: 2023-03-20
Attending: EMERGENCY MEDICINE
Payer: MEDICARE

## 2023-01-01 ENCOUNTER — RX RENEWAL (OUTPATIENT)
Age: 88
End: 2023-01-01

## 2023-01-01 ENCOUNTER — EMERGENCY (EMERGENCY)
Facility: HOSPITAL | Age: 88
LOS: 0 days | Discharge: HOME | End: 2023-02-04
Attending: STUDENT IN AN ORGANIZED HEALTH CARE EDUCATION/TRAINING PROGRAM
Payer: MEDICARE

## 2023-01-01 VITALS
DIASTOLIC BLOOD PRESSURE: 68 MMHG | BODY MASS INDEX: 15.51 KG/M2 | SYSTOLIC BLOOD PRESSURE: 126 MMHG | HEIGHT: 60 IN | TEMPERATURE: 97.6 F | OXYGEN SATURATION: 95 % | WEIGHT: 79 LBS | HEART RATE: 78 BPM

## 2023-01-01 VITALS
RESPIRATION RATE: 18 BRPM | OXYGEN SATURATION: 98 % | HEART RATE: 78 BPM | SYSTOLIC BLOOD PRESSURE: 152 MMHG | HEIGHT: 61 IN | DIASTOLIC BLOOD PRESSURE: 68 MMHG | TEMPERATURE: 99 F | WEIGHT: 119.93 LBS

## 2023-01-01 VITALS
HEART RATE: 65 BPM | DIASTOLIC BLOOD PRESSURE: 55 MMHG | RESPIRATION RATE: 16 BRPM | OXYGEN SATURATION: 95 % | SYSTOLIC BLOOD PRESSURE: 134 MMHG

## 2023-01-01 VITALS
DIASTOLIC BLOOD PRESSURE: 62 MMHG | OXYGEN SATURATION: 99 % | TEMPERATURE: 98 F | HEART RATE: 64 BPM | RESPIRATION RATE: 20 BRPM | SYSTOLIC BLOOD PRESSURE: 150 MMHG

## 2023-01-01 VITALS — HEIGHT: 61 IN | WEIGHT: 74.96 LBS

## 2023-01-01 DIAGNOSIS — M79.605 PAIN IN LEFT LEG: ICD-10-CM

## 2023-01-01 DIAGNOSIS — F03.A0 UNSPECIFIED DEMENTIA, MILD, WITHOUT BEHAVIORAL DISTURBANCE, PSYCHOTIC DISTURBANCE, MOOD DISTURBANCE, AND ANXIETY: ICD-10-CM

## 2023-01-01 DIAGNOSIS — E78.5 HYPERLIPIDEMIA, UNSPECIFIED: ICD-10-CM

## 2023-01-01 DIAGNOSIS — F32.A DEPRESSION, UNSPECIFIED: ICD-10-CM

## 2023-01-01 DIAGNOSIS — I25.10 ATHEROSCLEROTIC HEART DISEASE OF NATIVE CORONARY ARTERY WITHOUT ANGINA PECTORIS: ICD-10-CM

## 2023-01-01 DIAGNOSIS — S09.90XA UNSPECIFIED INJURY OF HEAD, INITIAL ENCOUNTER: ICD-10-CM

## 2023-01-01 DIAGNOSIS — Z96.649 PRESENCE OF UNSPECIFIED ARTIFICIAL HIP JOINT: ICD-10-CM

## 2023-01-01 DIAGNOSIS — L89.323 PRESSURE ULCER OF LEFT BUTTOCK, STAGE 3: Chronic | ICD-10-CM

## 2023-01-01 DIAGNOSIS — I10 ESSENTIAL (PRIMARY) HYPERTENSION: ICD-10-CM

## 2023-01-01 DIAGNOSIS — Z95.5 PRESENCE OF CORONARY ANGIOPLASTY IMPLANT AND GRAFT: ICD-10-CM

## 2023-01-01 DIAGNOSIS — Z98.890 OTHER SPECIFIED POSTPROCEDURAL STATES: Chronic | ICD-10-CM

## 2023-01-01 DIAGNOSIS — Z95.0 PRESENCE OF CARDIAC PACEMAKER: ICD-10-CM

## 2023-01-01 DIAGNOSIS — I25.2 OLD MYOCARDIAL INFARCTION: ICD-10-CM

## 2023-01-01 DIAGNOSIS — I44.7 LEFT BUNDLE-BRANCH BLOCK, UNSPECIFIED: ICD-10-CM

## 2023-01-01 DIAGNOSIS — R63.8 OTHER SYMPTOMS AND SIGNS CONCERNING FOOD AND FLUID INTAKE: ICD-10-CM

## 2023-01-01 DIAGNOSIS — F33.9 MAJOR DEPRESSIVE DISORDER, RECURRENT, UNSPECIFIED: Chronic | ICD-10-CM

## 2023-01-01 DIAGNOSIS — Z20.822 CONTACT WITH AND (SUSPECTED) EXPOSURE TO COVID-19: ICD-10-CM

## 2023-01-01 DIAGNOSIS — F02.80 DEMENTIA IN OTHER DISEASES CLASSIFIED ELSEWHERE, UNSPECIFIED SEVERITY, WITHOUT BEHAVIORAL DISTURBANCE, PSYCHOTIC DISTURBANCE, MOOD DISTURBANCE, AND ANXIETY: ICD-10-CM

## 2023-01-01 DIAGNOSIS — I48.0 PAROXYSMAL ATRIAL FIBRILLATION: ICD-10-CM

## 2023-01-01 DIAGNOSIS — N18.4 CHRONIC KIDNEY DISEASE, STAGE 4 (SEVERE): ICD-10-CM

## 2023-01-01 DIAGNOSIS — M25.552 PAIN IN LEFT HIP: ICD-10-CM

## 2023-01-01 DIAGNOSIS — Z86.718 PERSONAL HISTORY OF OTHER VENOUS THROMBOSIS AND EMBOLISM: ICD-10-CM

## 2023-01-01 DIAGNOSIS — M40.209 UNSPECIFIED KYPHOSIS, SITE UNSPECIFIED: ICD-10-CM

## 2023-01-01 DIAGNOSIS — Z88.5 ALLERGY STATUS TO NARCOTIC AGENT: ICD-10-CM

## 2023-01-01 DIAGNOSIS — F03.90 UNSPECIFIED DEMENTIA, UNSPECIFIED SEVERITY, WITHOUT BEHAVIORAL DISTURBANCE, PSYCHOTIC DISTURBANCE, MOOD DISTURBANCE, AND ANXIETY: ICD-10-CM

## 2023-01-01 DIAGNOSIS — Z96.643 PRESENCE OF ARTIFICIAL HIP JOINT, BILATERAL: Chronic | ICD-10-CM

## 2023-01-01 DIAGNOSIS — U07.1 COVID-19: ICD-10-CM

## 2023-01-01 DIAGNOSIS — M19.90 UNSPECIFIED OSTEOARTHRITIS, UNSPECIFIED SITE: ICD-10-CM

## 2023-01-01 DIAGNOSIS — F33.9 MAJOR DEPRESSIVE DISORDER, RECURRENT, UNSPECIFIED: ICD-10-CM

## 2023-01-01 DIAGNOSIS — I44.2 ATRIOVENTRICULAR BLOCK, COMPLETE: ICD-10-CM

## 2023-01-01 DIAGNOSIS — I34.0 NONRHEUMATIC MITRAL (VALVE) INSUFFICIENCY: ICD-10-CM
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
## 2023-01-01 DIAGNOSIS — I51.7 CARDIOMEGALY: ICD-10-CM

## 2023-01-01 DIAGNOSIS — Z96.643 PRESENCE OF ARTIFICIAL HIP JOINT, BILATERAL: ICD-10-CM

## 2023-01-01 DIAGNOSIS — R09.89 OTHER SPECIFIED SYMPTOMS AND SIGNS INVOLVING THE CIRCULATORY AND RESPIRATORY SYSTEMS: ICD-10-CM

## 2023-01-01 DIAGNOSIS — I35.0 NONRHEUMATIC AORTIC (VALVE) STENOSIS: ICD-10-CM

## 2023-01-01 DIAGNOSIS — N18.32 CHRONIC KIDNEY DISEASE, STAGE 3B: ICD-10-CM

## 2023-01-01 DIAGNOSIS — T84.038A MECHANICAL LOOSENING OF OTHER INTERNAL PROSTHETIC JOINT, INITIAL ENCOUNTER: ICD-10-CM

## 2023-01-01 DIAGNOSIS — I49.5 SICK SINUS SYNDROME: Chronic | ICD-10-CM

## 2023-01-01 DIAGNOSIS — M81.0 AGE-RELATED OSTEOPOROSIS W/OUT CURRENT PATHOLOGICAL FRACTURE: ICD-10-CM

## 2023-01-01 DIAGNOSIS — G30.1 ALZHEIMER'S DISEASE WITH LATE ONSET: ICD-10-CM

## 2023-01-01 DIAGNOSIS — M79.604 PAIN IN RIGHT LEG: ICD-10-CM

## 2023-01-01 DIAGNOSIS — R63.4 ABNORMAL WEIGHT LOSS: ICD-10-CM

## 2023-01-01 DIAGNOSIS — S69.90XA UNSPECIFIED INJURY OF UNSPECIFIED WRIST, HAND AND FINGER(S), INITIAL ENCOUNTER: Chronic | ICD-10-CM

## 2023-01-01 DIAGNOSIS — R53.1 WEAKNESS: ICD-10-CM

## 2023-01-01 DIAGNOSIS — S76.219A STRAIN OF ADDUCTOR MUSCLE, FASCIA AND TENDON OF UNSPECIFIED THIGH, INITIAL ENCOUNTER: ICD-10-CM

## 2023-01-01 DIAGNOSIS — Z96.649 MECHANICAL LOOSENING OF OTHER INTERNAL PROSTHETIC JOINT, INITIAL ENCOUNTER: ICD-10-CM
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
## 2023-01-01 DIAGNOSIS — F03.91 UNSPECIFIED DEMENTIA WITH BEHAVIORAL DISTURBANCE: Chronic | ICD-10-CM

## 2023-01-01 DIAGNOSIS — I73.9 PERIPHERAL VASCULAR DISEASE, UNSPECIFIED: Chronic | ICD-10-CM

## 2023-01-01 DIAGNOSIS — I25.10 ATHEROSCLEROTIC HEART DISEASE OF NATIVE CORONARY ARTERY W/OUT ANGINA PECTORIS: ICD-10-CM

## 2023-01-01 DIAGNOSIS — Z87.891 PERSONAL HISTORY OF NICOTINE DEPENDENCE: ICD-10-CM

## 2023-01-01 LAB
-  AMIKACIN: SIGNIFICANT CHANGE UP
-  AMOXICILLIN/CLAVULANIC ACID: SIGNIFICANT CHANGE UP
-  AMPICILLIN/SULBACTAM: SIGNIFICANT CHANGE UP
-  AMPICILLIN: SIGNIFICANT CHANGE UP
-  AZTREONAM: SIGNIFICANT CHANGE UP
-  CEFAZOLIN: SIGNIFICANT CHANGE UP
-  CEFEPIME: SIGNIFICANT CHANGE UP
-  CEFOXITIN: SIGNIFICANT CHANGE UP
-  CEFTRIAXONE: SIGNIFICANT CHANGE UP
-  CEFUROXIME: SIGNIFICANT CHANGE UP
-  CIPROFLOXACIN: SIGNIFICANT CHANGE UP
-  ERTAPENEM: SIGNIFICANT CHANGE UP
-  GENTAMICIN: SIGNIFICANT CHANGE UP
-  IMIPENEM: SIGNIFICANT CHANGE UP
-  LEVOFLOXACIN: SIGNIFICANT CHANGE UP
-  MEROPENEM: SIGNIFICANT CHANGE UP
-  NITROFURANTOIN: SIGNIFICANT CHANGE UP
-  PIPERACILLIN/TAZOBACTAM: SIGNIFICANT CHANGE UP
-  TOBRAMYCIN: SIGNIFICANT CHANGE UP
-  TRIMETHOPRIM/SULFAMETHOXAZOLE: SIGNIFICANT CHANGE UP
ALBUMIN SERPL ELPH-MCNC: 2.7 G/DL — LOW (ref 3.3–5)
ALBUMIN SERPL ELPH-MCNC: 4 G/DL
ALP BLD-CCNC: 88 U/L
ALP SERPL-CCNC: 90 U/L — SIGNIFICANT CHANGE UP (ref 40–120)
ALT FLD-CCNC: 14 U/L — SIGNIFICANT CHANGE UP (ref 12–78)
ALT SERPL-CCNC: 11 U/L
ANION GAP SERPL CALC-SCNC: 12 MMOL/L
ANION GAP SERPL CALC-SCNC: 4 MMOL/L — LOW (ref 5–17)
APPEARANCE UR: CLEAR — SIGNIFICANT CHANGE UP
APTT BLD: 28 SEC — SIGNIFICANT CHANGE UP (ref 27.5–35.5)
AST SERPL-CCNC: 13 U/L
AST SERPL-CCNC: 18 U/L — SIGNIFICANT CHANGE UP (ref 15–37)
BACTERIA # UR AUTO: ABNORMAL
BASOPHILS # BLD AUTO: 0.05 K/UL
BASOPHILS # BLD AUTO: 0.08 K/UL — SIGNIFICANT CHANGE UP (ref 0–0.2)
BASOPHILS NFR BLD AUTO: 0.6 %
BASOPHILS NFR BLD AUTO: 1 % — SIGNIFICANT CHANGE UP (ref 0–2)
BILIRUB SERPL-MCNC: 0.3 MG/DL
BILIRUB SERPL-MCNC: 0.5 MG/DL — SIGNIFICANT CHANGE UP (ref 0.2–1.2)
BILIRUB UR-MCNC: NEGATIVE — SIGNIFICANT CHANGE UP
BUN SERPL-MCNC: 19 MG/DL — SIGNIFICANT CHANGE UP (ref 7–23)
BUN SERPL-MCNC: 31 MG/DL
CALCIUM SERPL-MCNC: 8.9 MG/DL — SIGNIFICANT CHANGE UP (ref 8.5–10.1)
CALCIUM SERPL-MCNC: 9.2 MG/DL
CHLORIDE SERPL-SCNC: 101 MMOL/L
CHLORIDE SERPL-SCNC: 105 MMOL/L — SIGNIFICANT CHANGE UP (ref 96–108)
CO2 SERPL-SCNC: 25 MMOL/L
CO2 SERPL-SCNC: 25 MMOL/L — SIGNIFICANT CHANGE UP (ref 22–31)
COLOR SPEC: YELLOW — SIGNIFICANT CHANGE UP
CREAT SERPL-MCNC: 1.16 MG/DL — SIGNIFICANT CHANGE UP (ref 0.5–1.3)
CREAT SERPL-MCNC: 1.78 MG/DL
CULTURE RESULTS: SIGNIFICANT CHANGE UP
DIFF PNL FLD: ABNORMAL
EGFR: 25 ML/MIN/1.73M2
EGFR: 42 ML/MIN/1.73M2 — LOW
EOSINOPHIL # BLD AUTO: 0.06 K/UL
EOSINOPHIL # BLD AUTO: 0.06 K/UL — SIGNIFICANT CHANGE UP (ref 0–0.5)
EOSINOPHIL NFR BLD AUTO: 0.7 %
EOSINOPHIL NFR BLD AUTO: 0.7 % — SIGNIFICANT CHANGE UP (ref 0–6)
EPI CELLS # UR: SIGNIFICANT CHANGE UP
GLUCOSE SERPL-MCNC: 128 MG/DL — HIGH (ref 70–99)
GLUCOSE SERPL-MCNC: 87 MG/DL
GLUCOSE UR QL: NEGATIVE — SIGNIFICANT CHANGE UP
HCT VFR BLD CALC: 31.6 % — LOW (ref 34.5–45)
HCT VFR BLD CALC: 34.7 %
HGB BLD-MCNC: 10.6 G/DL — LOW (ref 11.5–15.5)
HGB BLD-MCNC: 11.3 G/DL
IMM GRANULOCYTES NFR BLD AUTO: 0.4 %
IMM GRANULOCYTES NFR BLD AUTO: 0.4 % — SIGNIFICANT CHANGE UP (ref 0–0.9)
INR BLD: 1.26 RATIO — HIGH (ref 0.88–1.16)
KETONES UR-MCNC: ABNORMAL
LACTATE SERPL-SCNC: 1.6 MMOL/L — SIGNIFICANT CHANGE UP (ref 0.7–2)
LEUKOCYTE ESTERASE UR-ACNC: ABNORMAL
LYMPHOCYTES # BLD AUTO: 0.78 K/UL — LOW (ref 1–3.3)
LYMPHOCYTES # BLD AUTO: 0.88 K/UL
LYMPHOCYTES # BLD AUTO: 9.7 % — LOW (ref 13–44)
LYMPHOCYTES NFR BLD AUTO: 9.8 %
MAN DIFF?: NORMAL
MCHC RBC-ENTMCNC: 30.5 PG — SIGNIFICANT CHANGE UP (ref 27–34)
MCHC RBC-ENTMCNC: 31 PG
MCHC RBC-ENTMCNC: 32.6 GM/DL
MCHC RBC-ENTMCNC: 33.5 GM/DL — SIGNIFICANT CHANGE UP (ref 32–36)
MCV RBC AUTO: 90.8 FL — SIGNIFICANT CHANGE UP (ref 80–100)
MCV RBC AUTO: 95.3 FL
METHOD TYPE: SIGNIFICANT CHANGE UP
MONOCYTES # BLD AUTO: 0.71 K/UL
MONOCYTES # BLD AUTO: 0.77 K/UL — SIGNIFICANT CHANGE UP (ref 0–0.9)
MONOCYTES NFR BLD AUTO: 7.9 %
MONOCYTES NFR BLD AUTO: 9.6 % — SIGNIFICANT CHANGE UP (ref 2–14)
NEUTROPHILS # BLD AUTO: 6.31 K/UL — SIGNIFICANT CHANGE UP (ref 1.8–7.4)
NEUTROPHILS # BLD AUTO: 7.27 K/UL
NEUTROPHILS NFR BLD AUTO: 78.6 % — HIGH (ref 43–77)
NEUTROPHILS NFR BLD AUTO: 80.6 %
NITRITE UR-MCNC: NEGATIVE — SIGNIFICANT CHANGE UP
ORGANISM # SPEC MICROSCOPIC CNT: SIGNIFICANT CHANGE UP
ORGANISM # SPEC MICROSCOPIC CNT: SIGNIFICANT CHANGE UP
PH UR: 7 — SIGNIFICANT CHANGE UP (ref 5–8)
PLATELET # BLD AUTO: 218 K/UL
PLATELET # BLD AUTO: 337 K/UL — SIGNIFICANT CHANGE UP (ref 150–400)
POTASSIUM SERPL-MCNC: 4.2 MMOL/L — SIGNIFICANT CHANGE UP (ref 3.5–5.3)
POTASSIUM SERPL-SCNC: 4.2 MMOL/L — SIGNIFICANT CHANGE UP (ref 3.5–5.3)
POTASSIUM SERPL-SCNC: 4.6 MMOL/L
PROT SERPL-MCNC: 6.5 G/DL
PROT SERPL-MCNC: 6.5 GM/DL — SIGNIFICANT CHANGE UP (ref 6–8.3)
PROT UR-MCNC: 30 MG/DL
PROTHROM AB SERPL-ACNC: 14.7 SEC — HIGH (ref 10.5–13.4)
RAPID RVP RESULT: SIGNIFICANT CHANGE UP
RBC # BLD: 3.48 M/UL — LOW (ref 3.8–5.2)
RBC # BLD: 3.64 M/UL
RBC # FLD: 13.5 % — SIGNIFICANT CHANGE UP (ref 10.3–14.5)
RBC # FLD: 13.6 %
RBC CASTS # UR COMP ASSIST: SIGNIFICANT CHANGE UP /HPF (ref 0–4)
SARS-COV-2 RNA SPEC QL NAA+PROBE: SIGNIFICANT CHANGE UP
SODIUM SERPL-SCNC: 134 MMOL/L — LOW (ref 135–145)
SODIUM SERPL-SCNC: 137 MMOL/L
SP GR SPEC: 1.01 — SIGNIFICANT CHANGE UP (ref 1.01–1.02)
SPECIMEN SOURCE: SIGNIFICANT CHANGE UP
T3FREE SERPL-MCNC: 2.04 PG/ML
T4 FREE SERPL-MCNC: 1.3 NG/DL
TSH SERPL-ACNC: 1.25 UIU/ML
UROBILINOGEN FLD QL: 1
WBC # BLD: 8.03 K/UL — SIGNIFICANT CHANGE UP (ref 3.8–10.5)
WBC # FLD AUTO: 8.03 K/UL — SIGNIFICANT CHANGE UP (ref 3.8–10.5)
WBC # FLD AUTO: 9.01 K/UL
WBC UR QL: SIGNIFICANT CHANGE UP /HPF (ref 0–5)

## 2023-01-01 PROCEDURE — 72170 X-RAY EXAM OF PELVIS: CPT | Mod: 26

## 2023-01-01 PROCEDURE — 72125 CT NECK SPINE W/O DYE: CPT | Mod: 26,MA

## 2023-01-01 PROCEDURE — 71045 X-RAY EXAM CHEST 1 VIEW: CPT | Mod: 26

## 2023-01-01 PROCEDURE — 70450 CT HEAD/BRAIN W/O DYE: CPT | Mod: 26,MA

## 2023-01-01 PROCEDURE — 72125 CT NECK SPINE W/O DYE: CPT | Mod: MA

## 2023-01-01 PROCEDURE — 85610 PROTHROMBIN TIME: CPT

## 2023-01-01 PROCEDURE — 85025 COMPLETE CBC W/AUTO DIFF WBC: CPT

## 2023-01-01 PROCEDURE — 93005 ELECTROCARDIOGRAM TRACING: CPT

## 2023-01-01 PROCEDURE — 99214 OFFICE O/P EST MOD 30 MIN: CPT | Mod: 25

## 2023-01-01 PROCEDURE — 93010 ELECTROCARDIOGRAM REPORT: CPT

## 2023-01-01 PROCEDURE — 93296 REM INTERROG EVL PM/IDS: CPT

## 2023-01-01 PROCEDURE — 0225U NFCT DS DNA&RNA 21 SARSCOV2: CPT

## 2023-01-01 PROCEDURE — 71250 CT THORAX DX C-: CPT | Mod: 26,MA

## 2023-01-01 PROCEDURE — 99214 OFFICE O/P EST MOD 30 MIN: CPT

## 2023-01-01 PROCEDURE — 70450 CT HEAD/BRAIN W/O DYE: CPT | Mod: MA

## 2023-01-01 PROCEDURE — 72170 X-RAY EXAM OF PELVIS: CPT

## 2023-01-01 PROCEDURE — 74176 CT ABD & PELVIS W/O CONTRAST: CPT | Mod: 26,MA

## 2023-01-01 PROCEDURE — 99285 EMERGENCY DEPT VISIT HI MDM: CPT | Mod: 25

## 2023-01-01 PROCEDURE — 81001 URINALYSIS AUTO W/SCOPE: CPT

## 2023-01-01 PROCEDURE — 74176 CT ABD & PELVIS W/O CONTRAST: CPT | Mod: MA

## 2023-01-01 PROCEDURE — 87040 BLOOD CULTURE FOR BACTERIA: CPT | Mod: 91

## 2023-01-01 PROCEDURE — 73552 X-RAY EXAM OF FEMUR 2/>: CPT | Mod: LT

## 2023-01-01 PROCEDURE — 80053 COMPREHEN METABOLIC PANEL: CPT

## 2023-01-01 PROCEDURE — 83605 ASSAY OF LACTIC ACID: CPT

## 2023-01-01 PROCEDURE — 82962 GLUCOSE BLOOD TEST: CPT

## 2023-01-01 PROCEDURE — 36415 COLL VENOUS BLD VENIPUNCTURE: CPT

## 2023-01-01 PROCEDURE — 71250 CT THORAX DX C-: CPT | Mod: MA

## 2023-01-01 PROCEDURE — 87186 SC STD MICRODIL/AGAR DIL: CPT

## 2023-01-01 PROCEDURE — 85730 THROMBOPLASTIN TIME PARTIAL: CPT

## 2023-01-01 PROCEDURE — 93294 REM INTERROG EVL PM/LDLS PM: CPT

## 2023-01-01 PROCEDURE — 90792 PSYCH DIAG EVAL W/MED SRVCS: CPT

## 2023-01-01 PROCEDURE — 87086 URINE CULTURE/COLONY COUNT: CPT

## 2023-01-01 PROCEDURE — 71045 X-RAY EXAM CHEST 1 VIEW: CPT

## 2023-01-01 PROCEDURE — 99285 EMERGENCY DEPT VISIT HI MDM: CPT

## 2023-01-01 RX ORDER — OXYBUTYNIN CHLORIDE 5 MG/1
5 TABLET ORAL
Qty: 180 | Refills: 0 | Status: ACTIVE | COMMUNITY
Start: 2021-07-22 | End: 1900-01-01

## 2023-01-01 RX ORDER — ALENDRONATE SODIUM 70 MG/1
1 TABLET ORAL
Qty: 0 | Refills: 0 | DISCHARGE

## 2023-01-01 RX ORDER — CEFPODOXIME PROXETIL 100 MG
10 TABLET ORAL
Qty: 140 | Refills: 0
Start: 2023-01-01 | End: 2023-01-01

## 2023-01-01 RX ORDER — TRAZODONE HCL 50 MG
1 TABLET ORAL
Qty: 0 | Refills: 0 | DISCHARGE

## 2023-01-01 RX ORDER — SODIUM CHLORIDE 9 MG/ML
1000 INJECTION INTRAMUSCULAR; INTRAVENOUS; SUBCUTANEOUS ONCE
Refills: 0 | Status: COMPLETED | OUTPATIENT
Start: 2023-01-01 | End: 2023-01-01

## 2023-01-01 RX ORDER — ACETAMINOPHEN 500 MG
975 TABLET ORAL ONCE
Refills: 0 | Status: COMPLETED | OUTPATIENT
Start: 2023-01-01 | End: 2023-01-01

## 2023-01-01 RX ORDER — HYDROCHLOROTHIAZIDE 12.5 MG/1
12.5 TABLET ORAL
Qty: 90 | Refills: 0 | Status: ACTIVE | COMMUNITY
Start: 2021-07-22 | End: 1900-01-01

## 2023-01-01 RX ORDER — DESVENLAFAXINE 50 MG/1
1 TABLET, EXTENDED RELEASE ORAL
Qty: 0 | Refills: 0 | DISCHARGE

## 2023-01-01 RX ORDER — QUETIAPINE FUMARATE 50 MG/1
50 TABLET ORAL
Qty: 30 | Refills: 0 | Status: ACTIVE | COMMUNITY
Start: 2022-03-28 | End: 1900-01-01

## 2023-01-01 RX ORDER — HYDROCHLOROTHIAZIDE 25 MG
1 TABLET ORAL
Qty: 0 | Refills: 0 | DISCHARGE

## 2023-01-01 RX ORDER — QUETIAPINE FUMARATE 200 MG/1
1 TABLET, FILM COATED ORAL
Qty: 0 | Refills: 0 | DISCHARGE

## 2023-01-01 RX ORDER — PANTOPRAZOLE SODIUM 20 MG/1
1 TABLET, DELAYED RELEASE ORAL
Qty: 0 | Refills: 0 | DISCHARGE

## 2023-01-01 RX ORDER — ARIPIPRAZOLE 15 MG/1
1 TABLET ORAL
Qty: 0 | Refills: 0 | DISCHARGE

## 2023-01-01 RX ORDER — OXYBUTYNIN CHLORIDE 5 MG
1 TABLET ORAL
Qty: 0 | Refills: 0 | DISCHARGE

## 2023-01-01 RX ADMIN — SODIUM CHLORIDE 1000 MILLILITER(S): 9 INJECTION INTRAMUSCULAR; INTRAVENOUS; SUBCUTANEOUS at 12:18

## 2023-01-01 RX ADMIN — Medication 975 MILLIGRAM(S): at 17:01

## 2023-01-30 PROBLEM — N18.4: Status: ACTIVE | Noted: 2023-01-01

## 2023-01-30 PROBLEM — I49.5 SICK SINUS SYNDROME: Chronic | Status: ACTIVE | Noted: 2021-04-23

## 2023-01-30 PROBLEM — N18.32 CHRONIC KIDNEY DISEASE, STAGE 3B: Status: ACTIVE | Noted: 2021-03-31

## 2023-01-30 PROBLEM — F03.91 SENILE DEMENTIA WITH BEHAVIORAL DISTURBANCE: Chronic | Status: ACTIVE | Noted: 2022-03-28

## 2023-01-30 PROBLEM — I73.9 PVD (PERIPHERAL VASCULAR DISEASE): Chronic | Status: ACTIVE | Noted: 2019-12-17

## 2023-01-30 PROBLEM — I44.2 COMPLETE HEART BLOCK BY ELECTROCARDIOGRAM: Status: ACTIVE | Noted: 2021-04-05

## 2023-01-30 PROBLEM — L89.323 DECUBITUS ULCER OF LEFT BUTTOCK, STAGE 3: Chronic | Status: ACTIVE | Noted: 2022-02-16

## 2023-01-30 PROBLEM — F33.9 MAJOR DEPRESSION, RECURRENT, CHRONIC: Chronic | Status: ACTIVE | Noted: 2021-03-31

## 2023-01-30 NOTE — PHYSICAL EXAM
[Normal] : normal rate, regular rhythm, normal S1 and S2 and no murmur heard [de-identified] : Hearing is worse

## 2023-02-04 NOTE — ED PROVIDER NOTE - PHYSICAL EXAMINATION
Constitutional: Awake, Alert, non-toxic. No acute distress. chronically ill appearing.   HEAD: Normocephalic, atraumatic. No hematomas, contusions, lacerations, or signs of trauma seen on head/face/scalp.   EYES: PERRL, EOM intact, conjunctiva and sclera are clear bilaterally.  ENT: External ears normal. No rhinorrhea, no tracheal deviation   NECK: Supple, non-tender  CARDIOVASCULAR: regular rate and rhythm.  RESPIRATORY: Normal respiratory effort; breath sounds CTAB, no wheezes, rhonchi, or rales. Speaking in full sentences. No accessory muscle use.   ABDOMEN: Soft; non-tender, non-distended. No rebound or guarding.   EXTREMITIES:  no lower extremity edema, no deformities, kyphosis of back. No C, T, or L spine tenderness or obvious step-offs. no pelvis TTP  SKIN: Warm, dry  NEURO: A&O x1. Sensory and motor functions are grossly intact. Speech is normal. No facial droop. able to lift both legs off the bed on her own.

## 2023-02-04 NOTE — ED ADULT TRIAGE NOTE - CHIEF COMPLAINT QUOTE
pt. c/o fall, pt. was in kitchen and daughter heard her fall, pt. has baseline confusion. no blood thinners, pt. states she hit her head on washing machine, EMS states she was in kitchen not near washing machine.

## 2023-02-04 NOTE — ED PROVIDER NOTE - PATIENT PORTAL LINK FT
You can access the FollowMyHealth Patient Portal offered by [facility] by registering at the following website: [email or web address]. By joining [facility]’s FollowMyHealth portal, you will also be able to view your health information using other applications (apps) compatible with our system.

## 2023-02-04 NOTE — ED PROVIDER NOTE - NSFOLLOWUPINSTRUCTIONS_ED_ALL_ED_FT
Return to the Emergency Department for worsening or persistent symptoms, and/or ANY NEW OR CONCERNING SYMPTOMS. If you have issues obtaining follow up, please call: [phone] or [phone]  to obtain a doctor or specialist who takes your insurance in your area.    Fall Prevention    WHAT YOU NEED TO KNOW:    Fall prevention includes ways to make your home and other areas safer. It also includes ways you can move more carefully to prevent a fall. Health conditions that cause changes in your blood pressure, vision, or muscle strength and coordination may increase your risk for falls. Medicines may also increase your risk for falls if they make you dizzy, weak, or sleepy.     DISCHARGE INSTRUCTIONS:    Call 911 or have someone else call if:     You have fallen and are unconscious.      You have fallen and cannot move part of your body.    Contact your healthcare provider if:     You have fallen and have pain or a headache.      You have questions or concerns about your condition or care.    Fall prevention tips:     Stand or sit up slowly. This may help you keep your balance and prevent falls.      Use assistive devices as directed. Your healthcare provider may suggest that you use a cane or walker to help you keep your balance. You may need to have grab bars put in your bathroom near the toilet or in the shower.      Wear shoes that fit well and have soles that . Wear shoes both inside and outside. Use slippers with good . Do not wear shoes with high heels.      Wear a personal alarm. This is a device that allows you to call 911 if you fall and need help. Ask your healthcare provider for more information.      Stay active. Exercise can help strengthen your muscles and improve your balance. Your healthcare provider may recommend water aerobics or walking. He or she may also recommend physical therapy to improve your coordination. Never start an exercise program without talking to your healthcare provider first.       Manage your medical conditions. Keep all appointments with your healthcare providers. Visit your eye doctor as directed.           Home safety tips:     Add items to prevent falls in the bathroom. Put nonslip strips on your bath or shower floor to prevent you from slipping. Use a bath mat if you do not have carpet in the bathroom. This will prevent you from falling when you step out of the bath or shower. Use a shower seat so you do not need to stand while you shower. Sit on the toilet or a chair in your bathroom to dry yourself and put on clothing. This will prevent you from losing your balance from drying or dressing yourself while you are standing.       Keep paths clear. Remove books, shoes, and other objects from walkways and stairs. Place cords for telephones and lamps out of the way so that you do not need to walk over them. Tape them down if you cannot move them. Remove small rugs. If you cannot remove a rug, secure it with double-sided tape. This will prevent you from tripping.       Install bright lights in your home. Use night lights to help light paths to the bathroom or kitchen. Always turn on the light before you start walking.      Keep items you use often on shelves within reach. Do not use a step stool to help you reach an item.      Paint or place reflective tape on the edges of your stairs. This will help you see the stairs better.    Follow up with your healthcare provider as directed: Write down your questions so you remember to ask them during your visits.

## 2023-02-04 NOTE — ED PROVIDER NOTE - PROGRESS NOTE DETAILS
spoke with patient's daughter diana who lives with her. said that patient may have fallen off her chair onto the floor. afterwards they tried to help her walk and she had trouble walking so that is why they called 911. recently was started on trazadone as well. daughter requesting pt/ot and sw. consults placed. will add on additional trauma imaging. John Cutler MD angelica - Trauma work-up negative for acute traumatic injuries, previous chronic fractures of ribs, sternum, chronic compression fractures of the thoracic and lumbar spine are unchanged from previous per the radiology report  I discussed these findings with the patient's daughter.  Physical therapy unable to evaluate the patient in the ED.   spoke with patient's daughter and ambulation trial was performed here in the ED.  Patient was able to ambulate safely with a walker which is her baseline.  Daughter will pick patient up to bring her home tonight and continue to pursue further home health after discussion with social work here.  Return precautions discussed

## 2023-02-04 NOTE — ED PROVIDER NOTE - OBJECTIVE STATEMENT
89 year old female with PMHx of HTN, HLD, dementia, depression, DVT, OA, mitral regurgitation, LVH, CAD, paroxysmal Afib, MI, prior smoker with no formal dx of reactive airway disease but has home nebulizer machine, Complete heart block s/p PPM presents to the ED s/p fall. Pt reports that she hit her head. Also notes some b/l leg pain. Otherwise, pt is a poor historian. 89 year old female with PMHx of HTN, HLD, dementia, depression, mitral regurgitation, LVH, CAD, paroxysmal Afib, MI, Complete heart block s/p PPM presents to the ED s/p fall. Pt reports that she hit her head. Also notes some b/l leg pain. Otherwise, pt is a poor historian. 89 year old female with PMHx of HTN, HLD, dementia, depression, mitral regurgitation, LVH, CAD, paroxysmal Afib, MI, Complete heart block s/p PPM presents to the ED s/p fall. Pt reports that she hit her head. Also notes some b/l leg pain. Otherwise, pt is a poor historian. per ems, pt not reportedly on AC.

## 2023-02-04 NOTE — ED PROVIDER NOTE - CLINICAL SUMMARY MEDICAL DECISION MAKING FREE TEXT BOX
Pt with possible fall at home. She does state that she fell and hit her head but is a poor historian. Will evaluate for intracranial injury. No evidence of traumatic injury on exam to warrant additional work up. With h/o complete heart block with ppm, will screen with EKG to evaluate for arrhythmia. History and exam does not appear consistent with syncope. Will check CT scan, EKG and acucheck, as well as talk with family.

## 2023-02-04 NOTE — ED ADULT NURSE NOTE - NS ED NURSE LEVEL OF CONSCIOUSNESS MENTAL STATUS
[facility] Cardiology Progress Note     Sushila Linda Patient Status:  Inpatient    1966 MRN [id number]   [address] TELEMETRY Attending Oscar Felton MD   Hosp Day # 5 PCP Mauro Bustillo DO     Subjective:    Legs are uncomfortable  Breathing is good    Telemetry personally reviewed showed : A. fib with rates ranging 80-90s    Review of Systems:  General: No fever or chills, No loss of appeti morning or you te.    RS: No hemoptysis  GI: No melena or hematochezia  : No urinary disturbance  Derm: No skin disorders   Heme: No blood dyscrasias.  Remainder of 12 point systems reviewed and negative (or as mentioned in HPI)    Allergies:   ALLERGIES:  No Known Allergies   Medications:  • vancomycin (VANCOCIN) IVPB  1,000 mg Intravenous Daily   • rivaroxaban  20 mg Oral Q Evening   • bumetanide  2 mg Oral BID   • insulin glargine  6 Units Subcutaneous Nightly   • VANCOMYCIN - PHARMACIST MONITORED   Does not apply See Admin Instructions   • cefepime (MAXIPIME) IVPB  1,000 mg Intravenous 2 times per day   • digoxin  125 mcg Oral Daily   • sodium hypochlorite   Topical Daily   • [Held by provider] sacubitril-valsartan  1 tablet Oral BID   • sodium chloride (PF)  2 mL Intracatheter 2 times per day   • Potassium Standard Replacement Protocol   Does not apply See Admin Instructions   • Magnesium Standard Replacement Protocol   Does not apply See Admin Instructions   • Phosphorus Standard Replacement Protocol   Does not apply See Admin Instructions   • insulin lispro   Subcutaneous TID WC   • insulin lispro   Subcutaneous Nightly   • atorvastatin  20 mg Oral Daily   • gabapentin  100 mg Oral 2 times per day   • metoPROLOL succinate  100 mg Oral BID   • pantoprazole  40 mg Oral Daily         morphine injection, HYDROcodone-acetaminophen, sodium chloride, sodium chloride, acetaminophen, polyethylene glycol, docusate sodium-sennosides, sodium chloride, dextrose, dextrose, glucagon, dextrose,  dextrose, melatonin, hydrALAZINE   Objective:     Vital Last Value 24 Hour Range   Temperature 98.1 °F (36.7 °C) (01/02/22 1556) Temp  Min: 97.3 °F (36.3 °C)  Max: 98.1 °F (36.7 °C)   Pulse (!) 104 (01/02/22 1556) Pulse  Min: 87  Max: 104   Respiratory 16 (01/02/22 1659) Resp  Min: 16  Max: 18   Non-Invasive  Blood Pressure 101/68 (01/02/22 1556) BP  Min: 100/68  Max: 110/58   Pulse Oximetry 100 % (01/02/22 1556) SpO2  Min: 97 %  Max: 100 %   Arterial   Blood Pressure   No data recorded     Intake/Output:   No intake or output data in the 24 hours ending 01/02/22 1859  Weight    12/27/21 1600 12/29/21 0200 12/30/21 0600 01/02/22 0444   Weight: 99.8 kg (220 lb) 101.7 kg (224 lb 3.3 oz) 102.1 kg (225 lb 1.4 oz) 97.1 kg (214 lb 1.1 oz)      Physical Exam  Constitutional:       General: She is not in acute distress.  HENT:      Head: Normocephalic.   Cardiovascular:      Rate and Rhythm: Normal rate. Rhythm irregularly irregular.      Heart sounds: Normal heart sounds. No murmur heard.  No friction rub.   Pulmonary:      Effort: Pulmonary effort is normal. No respiratory distress.      Breath sounds: Normal breath sounds. No wheezing or rales.   Chest:      Chest wall: No tenderness.   Abdominal:      General: There is no distension.      Palpations: Abdomen is soft.      Tenderness: There is no abdominal tenderness.   Musculoskeletal:         General: No deformity.      Right lower leg: No edema.      Left lower leg: No edema.      Comments: Bilateral surgical dressing   Skin:     General: Skin is warm and dry.      Findings: No rash.   Neurological:      Mental Status: She is alert and oriented to person, place, and time.      Coordination: Coordination normal.   Psychiatric:         Behavior: Behavior normal.         Thought Content: Thought content normal.         Judgment: Judgment normal.        Laboratory/Data:   (PERSONALLY REVIEWED)    Labs    CBC  Recent Labs   Lab 01/02/22  0544 01/01/22  0513 12/31/21  0624    WBC 8.9 11.2* 12.5*   HCT 29.2* 26.2* 30.2*   HGB 9.4* 8.6* 9.8*   * 484* 444       CMP  Recent Labs   Lab 01/02/22  0544 01/01/22  1335 01/01/22  0513 01/01/22  0513 12/31/21  0624 12/31/21  0624   SODIUM 142  --   --  141  --  139   POTASSIUM 4.0 3.8  --  3.3*   < > 4.3   CHLORIDE 102  --   --  102  --  103   CO2 30  --   --  31  --  22   GLUCOSE 139*  --   --  93  --  122*   BUN 28*  --   --  28*  --  29*   CREATININE 1.57*  --   --  1.54*  --  1.72*   CALCIUM 7.9*  --   --  7.8*  --  7.8*   TOTPROTEIN 6.2*  --   --  5.8*  --  6.3*   ALBUMIN 2.1*  --    < > 2.1*   < > 2.2*   BILIRUBIN 0.5  --   --  0.5  --  0.5   AST 10  --   --  9  --  15   GPT 11  --   --  10  --  10   ALKPT 88  --   --  90  --  101    < > = values in this interval not displayed.       Cardiac Labs  Recent Labs   Lab 12/28/21  1418 12/27/21  1616   TSH 5.316*  --    NTPROB  --  35,411*       Lipid Panel  Recent Labs   Lab 12/28/21  0249   CHOLESTEROL 69   HDL 33*   CALCLDL 23   TRIGLYCERIDE 63       Coags  No results found    ABG  No results found    Imaging    ECG:   Encounter Date: 12/28/21   Electrocardiogram 12-Lead   Result Value    Ventricular Rate EKG/Min (BPM) 114    Atrial Rate (BPM) 21    QRS-Interval (MSEC) 130    QT-Interval (MSEC) 378    QTc 521    R Axis (Degrees) -74    T Axis (Degrees) 73    REPORT TEXT      Atrial fibrillation  with rapid ventricular response  Left axis deviation  Nonspecific intraventricular block  Cannot rule out  Septal infarct  (cited on or before  11-OCT-2021)  Possible  Lateral infarct  (cited on or before  11-OCT-2021)  Decreased QRS amplitude  Abnormal ECG  When compared with ECG of  22-NOV-2021 15:56,  Questionable change in initial forces of  Lateral leads  Confirmed by SUKUMAR PHELPS MD ([id number]) on 12/28/2021 7:54:41 AM          Echocardiogram:  Results for orders placed during the hospital encounter of 08/18/21    TRANSTHORACIC ECHO (TTE) COMPLETE W/ W/O IMAGING AGENT    Narrative  *[facility]*  [address] [place] [address]  Transthoracic Echocardiogram (TTE)    Patient: Sushila Marr    Study Date/Time:     Aug 18 2021 10:46AM  MRN:     [id number]             FIN#:                [id number]  :     1966          Ht/Wt:               167cm 87kg  Age:     54                  BSA/BMI:             2.04m^2 31.2kg/m^2  Gender:  F                   Baseline BP:         92 / 62  Ordering Physician:   Gary Shetty    Referring Physician:  Gary Shetty Andrea Pranger, Andrea M    Attending Physician:  Gary Shetty  Performing Physician: [facility]  Diagnostic Physician: Dr. Curtis Austin MD  Sonographer:          Wendie Lozoya    --------------------------------------------------------------------------  INDICATIONS:   Congestive heart failure.    --------------------------------------------------------------------------  STUDY CONCLUSIONS  SUMMARY:    1. Left ventricle: The cavity size is normal. Wall thickness is normal.  Systolic function is severely reduced. The ejection fraction was  measured by visual estimation. Severe global hypokinesis. There is no  evidence of a thrombus . The ejection fraction is 20%.  2. Left atrium: The atrium is moderately dilated.  3. Baseline ECG: Atrial fibrillation.  4. No significant valvular abnormalities.    --------------------------------------------------------------------------  STUDY DATA:   Procedure:  Transthoracic echocardiography was performed.  Image quality was adequate.  M-mode, complete 2D, complete spectral  Doppler, and color Doppler.  Study status:  Routine.  Study completion:  There were no complications.    FINDINGS    LEFT VENTRICLE:  The cavity size is normal. Wall thickness is normal.  Systolic function is severely reduced.  Severe global hypokinesis. The  inferior wall and the septum are somewhat more hypokinetic. Unable to  accurately assess left ventricular diastolic function  parameters due to  atrial fibrillation.  There is no evidence of a thrombus .    The ejection  fraction was measured by visual estimation. The ejection fraction is 20%.    AORTIC VALVE:  The annulus is mildly calcified. The valve is trileaflet.  The leaflets are mildly thickened. Cusp separation is normal.  Doppler:  Transvalvular velocity is within the normal range. There is no stenosis.  No regurgitation.    AORTA:  Aortic root: The aortic root is normal in size.    MITRAL VALVE:  The annulus is mildly calcified. The leaflets are normal  thickness. Leaflet separation is normal.  Doppler:  Transvalvular velocity  is within the normal range. There is no evidence for stenosis.  Trivial  regurgitation.    The peak diastolic gradient is 2mm Hg.    ATRIAL SEPTUM:  The septum is normal.    LEFT ATRIUM:  The atrium is moderately dilated.    RIGHT VENTRICLE:  The cavity size is normal. Wall thickness is normal.  Systolic function is normal.    VENTRICULAR SEPTUM:   Septal motion is severely dyssynergic.    PULMONIC VALVE:   Not well visualized.  Doppler:   No significant  regurgitation.    TRICUSPID VALVE:   Structurally normal valve.    Doppler:   No significant  regurgitation.    RIGHT ATRIUM:  The atrium is normal in size.    PERICARDIUM:  There is no pericardial effusion.    SYSTEMIC VEINS:  Inferior vena cava: The vessel is normal in size. The respirophasic  diameter changes are in the normal range (greater than or equal to 50%).    BASELINE ECG:   Atrial fibrillation.    --------------------------------------------------------------------------  Measurements    Left ventricle              Value        Ref        Right ventricle             Value        Ref  ROSEMARY, LAX chord     (L)      3.6   cm     3.8 - 5.2  ROSEMARY, LAX                    1.5   cm     ---------  ESD, LAX chord              3.4   cm     2.2 - 3.5  Pressure, S                 10    mm Hg  ---------  ROSEMARY/bsa, LAX chord (L)      1.8   cm/m^2 2.3 -  3.1  ESD/bsa, LAX chord          1.7   cm/m^2 1.3 - 2.1  Left atrium                 Value        Ref  PW, ED, LAX        (H)      1.2   cm     0.6 - 0.9  AP dim, ES                  3.7   cm     2.7 - 3.8  PW, ED             (H)      1.2   cm     0.6 - 0.9  AP dim index                1.8   cm/m^2 1.5 - 2.3  IVS/PW, ED                  1.02         ---------  Area ES, A4C         (H)    22    cm^2   <=20  EDV                         54    ml     46 - 106   Vol, S               (H)    82    ml     22 - 52  ESV                (H)      47    ml     14 - 42    Vol/bsa, S           (H)    40    ml/m^2 16 - 34  EF                 (L)      20    %      54 - 74    Vol, ES, 1-p A4C     (H)    63    ml     22 - 52  SV                          7     ml     ---------  Vol/bsa, ES, 1-p A4C        31    ml/m^2 11 - 40  EDV/bsa            (L)      27    ml/m^2 29 - 61    Vol, ES, 1-p A2C     (H)    95    ml     22 - 52  ESV/bsa                     23    ml/m^2 8 - 24     Vol/bsa, ES, 1-p A2C (H)    46    ml/m^2 13 - 40  SV/bsa                      4     ml/m^2 ---------  ESV, 1-p A4C                59    ml     12 - 60    Mitral valve                Value        Ref  SV, 1-p A4C                 8     ml     ---------  Peak E                      0.77  m/sec  ---------  ESV/bsa, 1-p A4C            29    ml/m^2 7 - 35     Decel time                  198   ms     ---------  SV/bsa, 1-p A4C             4     ml/m^2 ---------  Peak grad, D                2     mm Hg  ---------  EDV, 2-p                    81    ml     46 - 106  ESV, 2-p           (H)      71    ml     14 - 42    Tricuspid valve             Value        Ref  EDV/bsa, 2-p                40    ml/m^2 29 - 61    TR peak v                   1.1   m/sec  <=2.8  ESV/bsa, 2-p       (H)      35    ml/m^2 8 - 24     Peak RV-RA grad, S          4     mm Hg  ---------    Ventricular septum          Value        Ref        Pulmonary artery            Value        Ref  IVS, ED             (H)      1.2   cm     0.6 - 0.9  Pressure, S                 10    mm Hg  ---------    Legend:  (L)  and  (H)  kade values outside specified reference range.    Prepared and electronically signed by  Dr. Curtis Austin MD  08/18/2021 17:22       Cardiac cath:   No results found for this or any previous visit.  Assessment and Plan:      Assessment  · Acute on chronic nonischemic cardiomyopathy EF 20%  · Essential hypertension  · Permanent atrial fibrillation [id number] of 5  · Hyperlipidemia  · PAD status post axillary left bifemoral bypass  · Obesity  · Moderate mitral valve regurgitation  · CKD     Plan     · Switch to PO diuretics tomorrow: bumetanide 1 mg PO BID (though defer to Nephrology's if alternate agent or dose is preferred)  · GDMT Toprol-XL, holding Entresto per Nephro; important to start this (or an ACE/ARB) in the outpatient setting  · Started digoxin for A. fib with RVR  · Creatinine improve, likely cardiorenal, ctm  · Start Jardiance as outpatient, follow-up with heart failure clinic as outpatient  · Leg elevation  · Continue Xarelto  · Continue atorvastatin and aspirin  · Assess mitral valve regurgitation as outpatient, consider CardioMEMS as outpatient, ICD eval as outpt    PAF: Toprol, Xarelto  SONYA on CKD III: diuresis, Nephrology following, avoid nephrotoxins  DM: insulin  Wound s/p ax-bifem bypass: wound care    OK for discharge tomorrow from a heart perspective.        Awake/Confused

## 2023-02-16 PROBLEM — M25.552 LEFT HIP PAIN: Status: ACTIVE | Noted: 2023-01-01

## 2023-02-16 PROBLEM — T84.038A LOOSENING OF PROSTHETIC HIP: Status: ACTIVE | Noted: 2023-01-01

## 2023-02-16 PROBLEM — S76.219A STRAIN OF ADDUCTOR MUSCLE OF THIGH: Status: ACTIVE | Noted: 2023-01-01

## 2023-02-16 PROBLEM — Z96.649 STATUS POST HIP HEMIARTHROPLASTY: Status: ACTIVE | Noted: 2023-01-01

## 2023-02-16 NOTE — DISCUSSION/SUMMARY
[de-identified] : Left hip hemiarthroplasty likely loosening, adductor strain\par \par Extensive discussion of the natural history of this issue was had with the patient.  We discussed the treatment options focusing on conservative therapy which includes anti-inflammatories, physical therapy/home exercise, & activity modification.  As patient is not a surgical candidate and the pain is acute from the fall, we will treat the hip stem conservatively.  Prescription for physical therapy was given to the patient.  If the pain continues after 1 to 2 months of therapy she should return for follow-up.  All questions answered.

## 2023-02-16 NOTE — HISTORY OF PRESENT ILLNESS
[de-identified] : Patient presents with 2 weeks of left thigh pain.  Presents with her daughter and aide.  States she fell.  Normally ambulates with a walker.  Still ambulating but does have more pain on the medial thigh.  Denies radiating pain or numbness and tingling.  States she does not have any pain while just standing still.  Taking Tylenol for the pain.  Reports bilateral hip surgery, left Jordan hip arthroplasty by Dr. Vivar approximately 8 years ago.  Extensive medical history and chart.

## 2023-02-16 NOTE — PHYSICAL EXAM
[de-identified] : General Appearance: normal without acute distress\par Mental: Alert and oriented x 3\par Psych/affect: appropriate, cooperative\par In wheelchair\par \par Left hip\par No pain with range of motion, axial load, or logroll\par Mild pain with resisted adduction, no pain with resisted abduction or flexion [de-identified] : 2/16/2023–AP and lateral left femur–left Jordan hip arthroplasty with osteolysis around cement mantle

## 2023-02-21 NOTE — H&P ADULT - NSHPPOADEEPVENOUSTHROMB_GEN_A_CORE
Dexcom Sensor  And Transmitter  Pending    Insurance response  Prescription Drug Insurance: [employer]   Notes: Prior authorization submitted - will update provider when decision has been made by insurance.            no

## 2023-03-20 NOTE — ED ADULT TRIAGE NOTE - CHIEF COMPLAINT QUOTE
Pt comes to the ED complaining of not eating or drinking for the past 3 days. Pt has had congestion and phlegm for the past week. Pt with hx dementia.

## 2023-03-20 NOTE — ED BEHAVIORAL HEALTH ASSESSMENT NOTE - DESCRIPTION
PMHx of HTN, HLD, dementia,  mitral regurgitation, LVH, CAD, paroxysmal Afib, MI, and complete heart block s/p PPM Patient is calm and cooperative.  Reported arrival in ED she was not talking and she had kept her eyes closed. Patient resides with her daughter who is at her aid and has additional 2 aides

## 2023-03-20 NOTE — PHARMACOTHERAPY INTERVENTION NOTE - COMMENTS
Medication reconciliation completed.  Patient was unable to provide medication information, spoke to daughter Alvina ([phone]) and they provided current medication list; confirmed with Dr. First Mead.  Alvina confirms that pt had been taking her meds even when she wasn't eating.

## 2023-03-20 NOTE — ED ADULT NURSE NOTE - OBJECTIVE STATEMENT
89y female presents to the ED alert and oriented to self brought in by daughter for failure to thrive. Pt daughter at bedside reports that pt has decrease PO intake, she has not been taking her medications due to difficulty swallowing. Pt daughter also reports that pt has been generally weaker.

## 2023-03-20 NOTE — ED BEHAVIORAL HEALTH ASSESSMENT NOTE - RISK ASSESSMENT
low acute risk.  Patient has no thoughts about harming herself, no agitation, no pain not depressed and anxious.    Low long-term risk: Patient has no psychiatric history of suicidality and hospitalization.  Patient has dementia.    Protective factors no past psychiatric history related to hospitalization or suicidality and patient supportive family.    Mitigation of risk continue treatment with current psychiatrist in community.

## 2023-03-20 NOTE — ED BEHAVIORAL HEALTH ASSESSMENT NOTE - HPI (INCLUDE ILLNESS QUALITY, SEVERITY, DURATION, TIMING, CONTEXT, MODIFYING FACTORS, ASSOCIATED SIGNS AND SYMPTOMS)
The patient is a 89 years old white woman who has history of dementia most likely the last 4 to 5 years, as per family, patient is followed by Dr. Gallegos in community currently on mirtazapine 45 mg, Abilify 2 mg and BuSpar 30 mg.  Patient hears voices and seeing things as a baseline part of her dementia.  Then agitated aggression no violence.  Patient has history of depression and anxiety prior to being demented and that is when she started seeing Dr. Ann.  No history of hospitalisationj and o history of suicidality .  Patient denies sleep and appetite problems or as per family she is losing weight.Patient denies discomfort or pain.  She denies having thoughts about harming herself.  Patient is able to state her date of birth and age accurately.  However she does not know what the date does not know what is the year and does not know where we are saying near this is the place where she lives.    Collateral was obtained from so daughter Alvina.

## 2023-03-20 NOTE — ED PROVIDER NOTE - PATIENT PORTAL LINK FT
You can access the FollowMyHealth Patient Portal offered by [facility] by registering at the following website: [email or web address]. By joining [facility]’s FollowMyHealth portal, you will also be able to view your health information using other applications (apps) compatible with our system.

## 2023-03-20 NOTE — ED PROVIDER NOTE - OBJECTIVE STATEMENT
89 year old female with PMHx of HTN, HLD, dementia, depression, mitral regurgitation, LVH, CAD, paroxysmal Afib, MI, and complete heart block s/p PPM presents to the ED BIBEMS complaining of decreased PO intake x 1 week. Daughter notes pt has been more weak than normal with increased congestion and loss of weight. Denies any recent vomiting or diarrhea. History is limited secondary to history of dementia, history provided by daughter.

## 2023-03-20 NOTE — ED PROVIDER NOTE - NSFOLLOWUPINSTRUCTIONS_ED_ALL_ED_FT
Follow-up with your regular physician (Verito Palmer)  Return with any persistent/worsening symptoms.   Continue current medications -- follow-up with your psychiatrist  Be sure to take medications as prescribed. Follow-up with your regular physician (Verito Palmer)  Return with any persistent/worsening symptoms.   Continue current medications -- follow-up with your psychiatrist  Be sure to take medications as prescribed.    I RECOMMEND THAT PATIENT THAT PATIENT HAVE 24 HOUR LIVE IN AID    BARBY EH MD

## 2023-03-20 NOTE — ED PROVIDER NOTE - CARE PROVIDER_API CALL
Luis Felipe Palmer)  Family Medicine  [address], [address], [place] [address]  Phone: [phone]  Fax: [phone]  Follow Up Time: 1-3 Days

## 2023-03-20 NOTE — ED BEHAVIORAL HEALTH ASSESSMENT NOTE - SUMMARY
Returned in summary this is a 89 years old white woman with history of dementia who is followed by psychiatrist at community and came to emergency room because of lowered p.o. intake.    Collateral was obtained from daughter and case discussed with daughter and Dr. Davenport by the emergency room.    We will defer psychotropic medication adjustments to patient's outpatient psychiatrist Dr. Ann.  However would advise lowering Remeron to 7.5 or 15g because higher dose can be stimulating.  Also will consider discontinuing BuSpar and consider changing antipsychotic to antipsychotic with more appetite inducing side effects.    It is not likely that her poor p.o. intake is related to anything else but with dementia.  Family and primary provider to consider adding Marinol to treatment.    Patient is not at imminent risk to harm self and others and she is acute risk is low.  She does not need inpatient psychiatry level of care and she can be discharged home once medically cleared.

## 2023-03-20 NOTE — ED BEHAVIORAL HEALTH ASSESSMENT NOTE - DETAILS
dementia no hx Patient's family to call 911 called to closest emergency room.  They are supportive. Self/ daughter

## 2023-03-20 NOTE — ED PROVIDER NOTE - PROGRESS NOTE DETAILS
D/W Estela -- consult psych, he will be in later Labs at baseline. Eval by psych and cleared for d/c - med mgmt deferred to outpatient psychiatrist.   D/W Gross - provided daughter can manage patient at home (encourage PO, meds), can be discharged.  Phone message left with daughter. pt s/o to me to dc when pts daughter arives, pt daughter arrived, willing to take pt home will dc    ED evaluation and management discussed with the patient and family (if available) in detail.  Close PMD follow up encouraged.  Strict ED return instructions discussed in detail and patient given the opportunity to ask any questions about their discharge diagnosis and instructions. Patient verbalized understanding.

## 2023-03-20 NOTE — ED BEHAVIORAL HEALTH ASSESSMENT NOTE - AXIS III
dementia,HTN, HLD, dementia,  mitral regurgitation, LVH, CAD, paroxysmal Afib, MI, and complete heart block s/p PPM

## 2023-03-23 NOTE — ED POST DISCHARGE NOTE - RESULT SUMMARY
50-99 E coli on UA. I spoke to daughter Alvina who says pt has been eating and drinking less and unsure what is causing, is thinking about hospice. Unclear cause of symptoms, but daughter willing to try abx in case symptoms are from uti. f/u PMD. signed Florina Presley PA-C

## 2023-03-27 NOTE — ED PROVIDER NOTE - NORMAL, MLM
Sarecycline Pregnancy And Lactation Text: This medication is Pregnancy Category D and not consider safe during pregnancy. It is also excreted in breast milk. [email or web address] all pertinent systems normal

## 2023-04-05 PROBLEM — U07.1 COVID-19 VIRUS INFECTION: Status: ACTIVE | Noted: 2022-01-13

## 2023-04-05 PROBLEM — M81.0 OSTEOPOROSIS: Status: ACTIVE | Noted: 2023-01-01

## 2023-04-05 PROBLEM — F32.A DEPRESSION: Status: ACTIVE | Noted: 2023-01-01

## 2023-05-02 ENCOUNTER — APPOINTMENT (OUTPATIENT)
Dept: ELECTROPHYSIOLOGY | Facility: CLINIC | Age: 88
End: 2023-05-02

## 2023-05-24 NOTE — ED ADULT NURSE NOTE - NSICDXPASTSURGICALHX_GEN_ALL_CORE_FT
5 PAST SURGICAL HISTORY:  H/O bilateral hip replacements 2010 , 2016    H/O cardiac catheterization Daughter reports it was years ago , no intervention required    History of shoulder surgery b/l    S/P ERCP 11/2018    Wrist injury b/l wrist surgery

## 2023-05-25 ENCOUNTER — APPOINTMENT (OUTPATIENT)
Dept: NEUROLOGY | Facility: CLINIC | Age: 88
End: 2023-05-25

## 2023-10-24 NOTE — ED ADULT NURSE NOTE - MUSCULOSKELETAL ASSESSMENT
Chip Fowler, Veronica SWAN, RN  Phone Number: [phone]     Yes          Good day  (Newest Message First)  View All Conversations on this Encounter  Chip Fowler  You7 minutes ago (10:39 AM)       Yes        You  Chip Page1 minutes ago (10:35 AM)     MH  Please confirm you have discontinue your Valcyte      Thank you    [facility] Coordinator        
WDL

## 2024-01-02 NOTE — PHYSICAL THERAPY INITIAL EVALUATION ADULT - DISCHARGE DISPOSITION, PT EVAL
Department of Anesthesiology  Postprocedure Note    Patient: Gem Perkins  MRN: [id number]  YOB: 1974  Date of evaluation: 1/2/2024    Procedure Summary       Date: 01/02/24 Room / Location: [address]    Anesthesia Start: 1322 Anesthesia Stop: 1501    Procedure: ROBOTIC CHOLECYSTECTOMY (Abdomen) Diagnosis:       Acute cholecystitis      (CHOLECYSTITIS)    Surgeons: Zenon Ponce MD Responsible Provider: Ramona Santos MD    Anesthesia Type: general ASA Status: 2 - Emergent            Anesthesia Type: No value filed.    Juan Phase I: Juan Score: 10    Juan Phase II:      Anesthesia Post Evaluation    Comments: Postoperative Anesthesia Note    Name:    Gem Perkins  MRN:      [id number]    Patient Vitals in the past 12 hrs:  01/02/24 1550, BP:136/79, Pulse:82, SpO2:90 %  01/02/24 1545, BP:(!) 141/72, Pulse:96, SpO2:94 %  01/02/24 1540, BP:135/76, Pulse:94, Resp:14, SpO2:90 %  01/02/24 1535, BP:137/70, Pulse:85, SpO2:91 %  01/02/24 1530, BP:(!) 147/75, Pulse:85, SpO2:93 %  01/02/24 1525, BP:131/72, Temp:99 °F (37.2 °C), Temp src:Temporal, Pulse:86, SpO2:92 %  01/02/24 1520, BP:(!) 142/74, Pulse:86, SpO2:92 %  01/02/24 1515, BP:130/73, Pulse:95, Resp:15, SpO2:93 %  01/02/24 1510, BP:(!) 156/75, Pulse:87, Resp:16, SpO2:92 %  01/02/24 1505, Pulse:92, Resp:18, SpO2:95 %  01/02/24 1500, BP:(!) 148/77, Pulse:94, Resp:18, SpO2:95 %  01/02/24 1457, BP:(!) 148/77, Temp:99.6 °F (37.6 °C), Temp src:Temporal, Pulse:93, Resp:20, SpO2:96 %  01/02/24 1233, BP:(!) 147/84, Temp:97.4 °F (36.3 °C), Temp src:Skin, Pulse:82, Resp:16, SpO2:98 %  01/02/24 0830, BP:(!) 160/84, Temp:98.8 °F (37.1 °C), Temp src:Oral, Pulse:77, Resp:14, SpO2:98 %     LABS:    CBC  Lab Results       Component                Value               Date/Time                  WBC                      14.7 (H)            01/02/2024 03:42 AM        HGB                      13.4                
Home with home PT for gait, balance, & strength training and to return pt to baseline functional mobility status. Rolling walker with ambulation (pt owns). Supervision/assist with mobility skills at this time (pt states daughter able to provide)./home w/ home PT

## 2024-03-20 NOTE — ED ADULT NURSE NOTE - NS ED NURSE RECORDS SENT
Medical Records sent/Images sent

## 2024-03-27 NOTE — ED PROVIDER NOTE - TEMPLATE, MLM
"Shaheen Christie is a 71 y.o. male patient.    Temp: 97.6 °F (36.4 °C) (03/27/24 0748)  Pulse: 77 (03/27/24 0748)  Resp: 18 (03/27/24 1440)  BP: 137/71 (03/27/24 1117)  SpO2: 96 % (03/27/24 0748)  Weight: 68 kg (150 lb) (03/20/24 1315)  Height: 5' 9.02" (175.3 cm) (03/20/24 1315)       Debridement    Date/Time: 3/27/2024 4:33 PM    Performed by: Lory Mckeon FNP  Authorized by: Lory Mckeon FNP  Associated wounds:        Altered Skin Integrity 01/22/24 1351 Sacral spine #4  Time out: Immediately prior to procedure a "time out" was called to verify the correct patient, procedure, equipment, support staff and site/side marked as required.    Consent Done?:  Yes (Verbal)  Local anesthesia used?: No      Wound Details:    Location:  Sacrum    Type of Debridement:  Excisional       Length (cm):  19.5       Area (sq cm):  165.75       Width (cm):  8.5       Percent Debrided (%):  25       Depth (cm):  1.4       Total Area Debrided (sq cm):  41.44    Depth of debridement:  Subcutaneous tissue    Tissue debrided:  Subcutaneous, Epidermis and Dermis    Devitalized tissue debrided:  Slough, Biofilm and Necrotic/Eschar    Instruments:  Blade and Scissors  Bleeding:  Minimal  Hemostasis Achieved: Yes  Method Used:  Pressure  Patient tolerance:  Patient tolerated the procedure well with no immediate complications      3/27/2024    " General

## 2024-05-20 NOTE — ED PROVIDER NOTE - DR. NAME
[Dear  ___] : Dear  [unfilled], [Consult Letter:] : I had the pleasure of evaluating your patient, [unfilled]. [Please see my note below.] : Please see my note below. [Consult Closing:] : Thank you very much for allowing me to participate in the care of this patient.  If you have any questions, please do not hesitate to contact me. [Sincerely,] : Sincerely, He

## 2024-07-23 NOTE — ED BEHAVIORAL HEALTH ASSESSMENT NOTE - NSBHMSEGAIT_PSY_A_CORE
Problem: Violence Risk or Actual  Goal: Anger and Impulse Control  Outcome: Not Progressing     Problem: Fall Injury Risk  Goal: Absence of Fall and Fall-Related Injury  Outcome: Not Progressing     Problem: Restraint, Nonviolent  Goal: Absence of Harm or Injury  Outcome: Not Progressing     Problem: Adult Inpatient Plan of Care  Goal: Plan of Care Review  Outcome: Not Progressing  Goal: Patient-Specific Goal (Individualized)  Outcome: Not Progressing  Goal: Absence of Hospital-Acquired Illness or Injury  Outcome: Not Progressing  Goal: Optimal Comfort and Wellbeing  Outcome: Not Progressing  Goal: Readiness for Transition of Care  Outcome: Not Progressing     Problem: Skin Injury Risk Increased  Goal: Skin Health and Integrity  Outcome: Not Progressing      Unable to assess
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
## 2025-03-10 NOTE — DIETITIAN INITIAL EVALUATION ADULT. - PERTINENT LABORATORY DATA
12-28    134<L>  |  99  |  26<H>  ----------------------------<  130<H>  3.7   |  27  |  1.45<H>    Ca    8.9      28 Dec 2021 16:51  Phos  3.5     12-28  Mg     2.1     12-28    TPro  7.0  /  Alb  3.3  /  TBili  0.5  /  DBili  x   /  AST  22  /  ALT  16  /  AlkPhos  67  12-28 To get better and follow your care plan as instructed.

## 2025-04-16 NOTE — ED PROVIDER NOTE - NS ED MD TWO NIGHTS YN
1. Continue alprazolam (Xanax) 0.50 mg by mouth TID. I have personally reviewed the OARRS report 4/16/2025. I have considered the risks of abuse, dependence, addiction and diversion. Benzo agreement signed 2/25/25 in media  2. Continue Lithium 600 mg by mouth in the morning, 300 mg at 3 pm, 450 mg at bedtime for moods  3. Continue Aripiprazole (Abilify) take 5 mg by mouth daily, may take additional half tablet (2.5 mg) by mouth daily PRN for moods. Mom will contact when refill is needed.  4. Risks, benefits, alternatives, off-label uses, and side effects of medications have been discussed with patient/caregiver. There is no report of signs/symptoms consistent with medication-induced impairment in daily functioning. At this time, benefits of medication felt to outweigh potential risks. Will continue to reassess need for psychotropic medication at regular 3 to 6 month intervals.  5. Return To Clinic Weds July 16, 2025 at 1 PM virtual or earlier if needed. Call [phone] to reschedule.  6. Rx for fasting labs and EKG. No Lithium for at least 10 hours prior to lab draw. Mom will send labs completed at PCP office to avoid duplication.     Thank you for seeing me today.  If you have any questions or concerns, do not hesitate to contact my office.  Millie Ugarte     TREATMENT TYPE                                                                                                  counseling and coordination of care addressing signs and symptoms of illness; risks/benefits and side effects of medications; and behavioral approaches to illness.  This note was created using electronic dictation. There may be errors in syntax and meaning. Please contact the office with any questions.    Yes